# Patient Record
Sex: MALE | Race: WHITE | NOT HISPANIC OR LATINO
[De-identification: names, ages, dates, MRNs, and addresses within clinical notes are randomized per-mention and may not be internally consistent; named-entity substitution may affect disease eponyms.]

---

## 2018-07-27 ENCOUNTER — APPOINTMENT (OUTPATIENT)
Dept: FAMILY MEDICINE | Facility: CLINIC | Age: 58
End: 2018-07-27
Payer: COMMERCIAL

## 2018-07-27 VITALS
RESPIRATION RATE: 14 BRPM | OXYGEN SATURATION: 98 % | HEART RATE: 93 BPM | SYSTOLIC BLOOD PRESSURE: 128 MMHG | TEMPERATURE: 98.6 F | WEIGHT: 231 LBS | BODY MASS INDEX: 32.22 KG/M2 | DIASTOLIC BLOOD PRESSURE: 87 MMHG

## 2018-07-27 VITALS — SYSTOLIC BLOOD PRESSURE: 124 MMHG | DIASTOLIC BLOOD PRESSURE: 76 MMHG

## 2018-07-27 PROCEDURE — 90471 IMMUNIZATION ADMIN: CPT

## 2018-07-27 PROCEDURE — 90715 TDAP VACCINE 7 YRS/> IM: CPT

## 2018-07-27 PROCEDURE — 99396 PREV VISIT EST AGE 40-64: CPT | Mod: 25

## 2018-07-27 NOTE — COUNSELING
[Weight management counseling provided] : Weight management [Healthy eating counseling provided] : healthy eating [Activity counseling provided] : activity [Behavioral health counseling provided] : behavioral health  [Decrease Portions] : Decrease food portions [Low Salt Diet] : Low salt diet [___ min/wk activity recommended] : [unfilled] min/wk activity recommended [Walking] : Walking [Sleep ___ hours/day] : Sleep [unfilled] hours/day [Engage in a relaxing activity] : Engage in a relaxing activity [None] : None [Good understanding] : Patient has a good understanding of lifestyle changes and the steps needed to achieve self management goals

## 2018-07-30 NOTE — PHYSICAL EXAM
[No Acute Distress] : no acute distress [Well Nourished] : well nourished [Well Developed] : well developed [Well-Appearing] : well-appearing [Normal Sclera/Conjunctiva] : normal sclera/conjunctiva [PERRL] : pupils equal round and reactive to light [Fundoscopic Exam Performed] : fundoscopic ~T exam ~C was performed [Normal Outer Ear/Nose] : the outer ears and nose were normal in appearance [Normal Oropharynx] : the oropharynx was normal [Normal TMs] : both tympanic membranes were normal [No JVD] : no jugular venous distention [Supple] : supple [Thyroid Normal, No Nodules] : the thyroid was normal and there were no nodules present [No Respiratory Distress] : no respiratory distress  [Clear to Auscultation] : lungs were clear to auscultation bilaterally [No Accessory Muscle Use] : no accessory muscle use [Normal Rate] : normal rate  [Regular Rhythm] : with a regular rhythm [Normal S1, S2] : normal S1 and S2 [No Murmur] : no murmur heard [Pedal Pulses Present] : the pedal pulses are present [No Edema] : there was no peripheral edema [No Extremity Clubbing/Cyanosis] : no extremity clubbing/cyanosis [No Palpable Aorta] : no palpable aorta [Normal Appearance] : normal in appearance [Soft] : abdomen soft [Non Tender] : non-tender [Non-distended] : non-distended [No Masses] : no abdominal mass palpated [No HSM] : no HSM [Normal Bowel Sounds] : normal bowel sounds [Normal Posterior Cervical Nodes] : no posterior cervical lymphadenopathy [Normal Anterior Cervical Nodes] : no anterior cervical lymphadenopathy [No CVA Tenderness] : no CVA  tenderness [No Spinal Tenderness] : no spinal tenderness [No Joint Swelling] : no joint swelling [Grossly Normal Strength/Tone] : grossly normal strength/tone [No Rash] : no rash [Normal Gait] : normal gait [Coordination Grossly Intact] : coordination grossly intact [No Focal Deficits] : no focal deficits [Deep Tendon Reflexes (DTR)] : deep tendon reflexes were 2+ and symmetric [Normal Affect] : the affect was normal [Alert and Oriented x3] : oriented to person, place, and time [Normal Insight/Judgement] : insight and judgment were intact [de-identified] : obese

## 2018-07-30 NOTE — HEALTH RISK ASSESSMENT
[Very Good] : ~his/her~ current health as very good [Excellent] : ~his/her~  mood as  excellent [No falls in past year] : Patient reported no falls in the past year [0] : 2) Feeling down, depressed, or hopeless: Not at all (0) [HIV test declined] : HIV test declined [Hepatitis C test declined] : Hepatitis C test declined [None] : None [With Family] : lives with family [# of Members in Household ___] :  household currently consist of [unfilled] member(s) [Graduate School] : graduate school [] :  [# Of Children ___] : has [unfilled] children [Sexually Active] : sexually active [Feels Safe at Home] : Feels safe at home [Fully functional (bathing, dressing, toileting, transferring, walking, feeding)] : Fully functional (bathing, dressing, toileting, transferring, walking, feeding) [Fully functional (using the telephone, shopping, preparing meals, housekeeping, doing laundry, using] : Fully functional and needs no help or supervision to perform IADLs (using the telephone, shopping, preparing meals, housekeeping, doing laundry, using transportation, managing medications and managing finances) [Smoke Detector] : smoke detector [Carbon Monoxide Detector] : carbon monoxide detector [Safety elements used in home] : safety elements used in home [Seat Belt] :  uses seat belt [Sunscreen] : uses sunscreen [] : No [de-identified] : renal - reviewed labs and doing well very stable, and derm- for actinic keratosies no cancers  [[id number]] : 0 [Change in mental status noted] : No change in mental status noted [Reports changes in hearing] : Reports no changes in hearing [Reports changes in vision] : Reports no changes in vision [Reports changes in dental health] : Reports no changes in dental health [HIVComments] : done prior to renal transplant [HepatitisCComments] : done prior to renal transplant [de-identified] : none

## 2018-09-10 ENCOUNTER — MED ADMIN CHARGE (OUTPATIENT)
Age: 58
End: 2018-09-10

## 2018-11-08 PROBLEM — Z23 NEED FOR TDAP VACCINATION: Status: RESOLVED | Noted: 2018-07-26 | Resolved: 2018-11-08

## 2018-11-09 ENCOUNTER — OUTPATIENT (OUTPATIENT)
Dept: OUTPATIENT SERVICES | Facility: HOSPITAL | Age: 58
LOS: 1 days | End: 2018-11-09
Payer: COMMERCIAL

## 2018-11-09 ENCOUNTER — APPOINTMENT (OUTPATIENT)
Dept: FAMILY MEDICINE | Facility: CLINIC | Age: 58
End: 2018-11-09
Payer: COMMERCIAL

## 2018-11-09 VITALS
RESPIRATION RATE: 14 BRPM | OXYGEN SATURATION: 98 % | SYSTOLIC BLOOD PRESSURE: 110 MMHG | HEIGHT: 71 IN | BODY MASS INDEX: 32.76 KG/M2 | WEIGHT: 234 LBS | HEART RATE: 96 BPM | DIASTOLIC BLOOD PRESSURE: 80 MMHG

## 2018-11-09 DIAGNOSIS — Z23 ENCOUNTER FOR IMMUNIZATION: ICD-10-CM

## 2018-11-09 DIAGNOSIS — Z01.818 ENCOUNTER FOR OTHER PREPROCEDURAL EXAMINATION: ICD-10-CM

## 2018-11-09 PROCEDURE — 93010 ELECTROCARDIOGRAM REPORT: CPT | Mod: NC

## 2018-11-09 PROCEDURE — 99213 OFFICE O/P EST LOW 20 MIN: CPT

## 2018-11-09 PROCEDURE — 93005 ELECTROCARDIOGRAM TRACING: CPT

## 2018-11-09 RX ORDER — CHOLECALCIFEROL (VITAMIN D3) 250 MCG
250 MCG CAPSULE ORAL
Qty: 10 | Refills: 0 | Status: ACTIVE | COMMUNITY
Start: 2018-11-09

## 2018-11-09 NOTE — COUNSELING
[Weight management counseling provided] : Weight management [Healthy eating counseling provided] : healthy eating [Activity counseling provided] : activity [Behavioral health counseling provided] : behavioral health  [Decrease Portions] : Decrease food portions [Low Salt Diet] : Low salt diet [___ min/wk activity recommended] : [unfilled] min/wk activity recommended [Walking] : Walking [Sleep ___ hours/day] : Sleep [unfilled] hours/day [Engage in a relaxing activity] : Engage in a relaxing activity [None] : None [Needs reinforcement, provided] : Patient needs reinforcement on understanding lifestyle changes and  the steps needed to achieve self management goals and reinforcement was provided

## 2018-11-12 NOTE — ADDENDUM
[FreeTextEntry1] : Review of EKG and lab data shows- EKG normal variant and unchanged from prior EKG in office borderline LVH, labs- normal CBC, renal profile normal electrolytes with creatinine 1.72 (at baseline post transplant) and GFR 46- stable and at baseline, other labs within normal range- see attached. medically optimized for proposed procedure.

## 2018-11-12 NOTE — ASSESSMENT
[High Risk Surgery - Intraperitoneal, Intrathoracic or Supringuinal Vascular Procedures] : High Risk Surgery - Intraperitoneal, Intrathoracic or Supringuinal Vascular Procedures - No (0) [Ischemic Heart Disease] : Ischemic Heart Disease - No (0) [Congestive Heart Failure] : Congestive Heart Failure - No (0) [Prior Cerebrovascular Accident or TIA] : Prior Cerebrovascular Accident or TIA - No (0) [Creatinine >= 2mg/dL (1 Point)] : Creatinine >= 2mg/dL - No (0) [Insulin-dependent Diabetic (1 Point)] : Insulin-dependent Diabetic - No (0) [0] : 0 , RCRI Class: I, Risk of Post-Op Cardiac Complications: 0.4%, Procedure Risk: Low-Risk [Patient Optimized for Surgery] : Patient optimized for surgery [As per surgery] : as per surgery [ECG] : ECG [FreeTextEntry5] : none [FreeTextEntry6] : hold aspirin for 3 days prior to procedure [FreeTextEntry7] : take all am medications as usual with sip of water

## 2018-11-12 NOTE — REVIEW OF SYSTEMS
[Back Pain] : back pain [Negative] : Heme/Lymph [FreeTextEntry9] : mild rare low back stiffness on occasion

## 2018-11-12 NOTE — HISTORY OF PRESENT ILLNESS
[Chronic Kidney Disease] : chronic kidney disease [No Pertinent Cardiac History] : no history of aortic stenosis, atrial fibrillation, coronary artery disease, recent myocardial infarction, or implantable device/pacemaker [No Pertinent Pulmonary History] : no history of asthma, COPD, sleep apnea, or smoking [No Adverse Anesthesia Reaction] : no adverse anesthesia reaction in self or family member [FreeTextEntry1] : Moh's procedure [FreeTextEntry2] : 11/15/18 [FreeTextEntry3] : Dr. 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 He is here for medical evaluation prior to the procedure. 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 His renal function is currently stable and he sees nephrology regularly. He also has hypertension, obesity, impaired fasting glucose, and hyperlipidemia all of which are stable at this time.

## 2018-11-12 NOTE — PHYSICAL EXAM
[No Acute Distress] : no acute distress [Well Nourished] : well nourished [Well Developed] : well developed [Well-Appearing] : well-appearing [Normal Sclera/Conjunctiva] : normal sclera/conjunctiva [PERRL] : pupils equal round and reactive to light [Fundoscopic Exam Performed] : fundoscopic ~T exam ~C was performed [Normal Outer Ear/Nose] : the outer ears and nose were normal in appearance [Normal Oropharynx] : the oropharynx was normal [Normal TMs] : both tympanic membranes were normal [No JVD] : no jugular venous distention [Supple] : supple [Thyroid Normal, No Nodules] : the thyroid was normal and there were no nodules present [No Respiratory Distress] : no respiratory distress  [Clear to Auscultation] : lungs were clear to auscultation bilaterally [No Accessory Muscle Use] : no accessory muscle use [Normal Rate] : normal rate  [Regular Rhythm] : with a regular rhythm [Normal S1, S2] : normal S1 and S2 [No Murmur] : no murmur heard [Pedal Pulses Present] : the pedal pulses are present [Soft] : abdomen soft [Non Tender] : non-tender [Non-distended] : non-distended [No Masses] : no abdominal mass palpated [No HSM] : no HSM [Normal Bowel Sounds] : normal bowel sounds [Normal Supraclavicular Nodes] : no supraclavicular lymphadenopathy [Normal Posterior Cervical Nodes] : no posterior cervical lymphadenopathy [Normal Anterior Cervical Nodes] : no anterior cervical lymphadenopathy [No CVA Tenderness] : no CVA  tenderness [No Spinal Tenderness] : no spinal tenderness [Normal Gait] : normal gait [No Focal Deficits] : no focal deficits [Normal Affect] : the affect was normal [Alert and Oriented x3] : oriented to person, place, and time [Normal Insight/Judgement] : insight and judgment were intact [___ +] : bilateral [unfilled]U+ pitting edema to the ankles [de-identified] : obese [de-identified] : superficial varicosities bilaterally, no evidence of tenderness [de-identified] : left temporal area scar from the prior removal of squamous cell carcinoma, very close to the canthus of the left eye

## 2018-11-15 ENCOUNTER — OUTPATIENT (OUTPATIENT)
Dept: OUTPATIENT SERVICES | Facility: HOSPITAL | Age: 58
LOS: 1 days | End: 2018-11-15
Payer: COMMERCIAL

## 2018-11-15 VITALS
HEIGHT: 71 IN | OXYGEN SATURATION: 100 % | HEART RATE: 90 BPM | RESPIRATION RATE: 16 BRPM | WEIGHT: 228.84 LBS | TEMPERATURE: 98 F | SYSTOLIC BLOOD PRESSURE: 125 MMHG | DIASTOLIC BLOOD PRESSURE: 88 MMHG

## 2018-11-15 VITALS
OXYGEN SATURATION: 100 % | SYSTOLIC BLOOD PRESSURE: 113 MMHG | RESPIRATION RATE: 17 BRPM | DIASTOLIC BLOOD PRESSURE: 80 MMHG | HEART RATE: 74 BPM

## 2018-11-15 DIAGNOSIS — C44.1291 SQUAMOUS CELL CARCINOMA OF SKIN OF LEFT UPPER EYELID, INCLUDING CANTHUS: ICD-10-CM

## 2018-11-15 DIAGNOSIS — Z94.0 KIDNEY TRANSPLANT STATUS: Chronic | ICD-10-CM

## 2018-11-15 PROCEDURE — 14041 TIS TRNFR F/C/C/M/N/A/G/H/F: CPT

## 2018-11-15 NOTE — ASU PATIENT PROFILE, ADULT - PMH
BPH (benign prostatic hyperplasia)    HLD (hyperlipidemia)    HTN (hypertension)    IFG (impaired fasting glucose)    Nephronophthisis

## 2018-11-15 NOTE — ASU DISCHARGE PLAN (ADULT/PEDIATRIC). - SPECIAL INSTRUCTIONS
keflex 250 mg po qid for 5 days  bacitracin ophthalmic ointment to left lateral wound once daily  report severe eye pain   active bleeding

## 2019-02-28 NOTE — ASU DISCHARGE PLAN (ADULT/PEDIATRIC). - WITH DR
Well developed savi Well developed Well developed Well developed Well developed Well developed Well developed Well developed

## 2019-10-17 PROBLEM — I10 ESSENTIAL (PRIMARY) HYPERTENSION: Chronic | Status: ACTIVE | Noted: 2018-11-15

## 2019-10-17 PROBLEM — R73.01 IMPAIRED FASTING GLUCOSE: Chronic | Status: ACTIVE | Noted: 2018-11-15

## 2019-10-17 PROBLEM — N40.0 BENIGN PROSTATIC HYPERPLASIA WITHOUT LOWER URINARY TRACT SYMPTOMS: Chronic | Status: ACTIVE | Noted: 2018-11-15

## 2019-10-17 PROBLEM — Q61.5 MEDULLARY CYSTIC KIDNEY: Chronic | Status: ACTIVE | Noted: 2018-11-15

## 2020-03-26 ENCOUNTER — NON-APPOINTMENT (OUTPATIENT)
Age: 60
End: 2020-03-26

## 2020-04-14 ENCOUNTER — NON-APPOINTMENT (OUTPATIENT)
Age: 60
End: 2020-04-14

## 2020-04-14 ENCOUNTER — APPOINTMENT (OUTPATIENT)
Dept: FAMILY MEDICINE | Facility: CLINIC | Age: 60
End: 2020-04-14
Payer: COMMERCIAL

## 2020-04-14 PROCEDURE — G2012 BRIEF CHECK IN BY MD/QHP: CPT

## 2020-04-14 RX ORDER — FAMOTIDINE 20 MG/1
20 TABLET, FILM COATED ORAL
Qty: 60 | Refills: 0 | Status: COMPLETED | COMMUNITY
Start: 2020-04-14

## 2020-04-14 NOTE — HISTORY OF PRESENT ILLNESS
[Verbal consent obtained from patient] : the patient, [unfilled] [Time Spent: ___ minutes] : I have spent [unfilled] minutes with the patient on the telephone [FreeTextEntry1] : fever 100-101 3-4 times per day still, symptoms for 3 weeks but first week not so much working during days for the first week. Then last week much worse. Still having increasing SOB and same level of cough. Spoke to transplant team initially but not since seemingly worsening with regard to SOB. Very high risk patient and despite no GI symptoms he is advised to call transplant team for additional advice at this time regarding possible need for coming to the hospital for admission- advising to be admitted with his transplant team rather than regular community based facility. Agrees to this plan and will call now. Also will call for follow up tomorrow.

## 2020-04-15 NOTE — PLAN
[FreeTextEntry1] : much improved today and will call if symptoms worsen and will let me know when fever has subsided.

## 2020-04-15 NOTE — HISTORY OF PRESENT ILLNESS
[No] : Patient has not been hospitalized for COVID-19. [Yes] : yes [Mild] : mild [None] : none [Improved] : improved [Patient advised to contact office if symptoms progress] : Patient advised to contact office if symptoms progress [Verbal consent obtained from patient] : the patient, [unfilled] [Time Spent: ___ minutes] : I have spent [unfilled] minutes with the patient on the telephone [TextBox_8] : 22 [TextBox_13] : 100 [TextBox_28] : pulse ox 98%, mild am cough but otherwise not much, much les SOB today. Feeling better, less conscious  [FreeTextEntry1] : told by transplant team micophenylate to be cut for 1 week. Not billable for short call

## 2020-05-08 ENCOUNTER — APPOINTMENT (OUTPATIENT)
Dept: FAMILY MEDICINE | Facility: CLINIC | Age: 60
End: 2020-05-08

## 2020-05-10 ENCOUNTER — RESULT REVIEW (OUTPATIENT)
Age: 60
End: 2020-05-10

## 2020-05-10 LAB
SARS-COV-2 IGG SERPL IA-ACNC: 0.1 INDEX
SARS-COV-2 IGG SERPL QL IA: NEGATIVE

## 2020-05-11 ENCOUNTER — TRANSCRIPTION ENCOUNTER (OUTPATIENT)
Age: 60
End: 2020-05-11

## 2020-05-13 ENCOUNTER — TRANSCRIPTION ENCOUNTER (OUTPATIENT)
Age: 60
End: 2020-05-13

## 2020-08-04 ENCOUNTER — APPOINTMENT (OUTPATIENT)
Dept: FAMILY MEDICINE | Facility: CLINIC | Age: 60
End: 2020-08-04

## 2020-08-06 ENCOUNTER — APPOINTMENT (OUTPATIENT)
Dept: FAMILY MEDICINE | Facility: CLINIC | Age: 60
End: 2020-08-06

## 2020-08-06 ENCOUNTER — APPOINTMENT (OUTPATIENT)
Dept: FAMILY MEDICINE | Facility: CLINIC | Age: 60
End: 2020-08-06
Payer: COMMERCIAL

## 2020-08-06 VITALS
RESPIRATION RATE: 14 BRPM | TEMPERATURE: 97.3 F | OXYGEN SATURATION: 99 % | BODY MASS INDEX: 29.68 KG/M2 | SYSTOLIC BLOOD PRESSURE: 140 MMHG | HEART RATE: 105 BPM | DIASTOLIC BLOOD PRESSURE: 80 MMHG | HEIGHT: 71 IN | WEIGHT: 212 LBS

## 2020-08-06 DIAGNOSIS — Z23 ENCOUNTER FOR IMMUNIZATION: ICD-10-CM

## 2020-08-06 DIAGNOSIS — Z92.29 PERSONAL HISTORY OF OTHER DRUG THERAPY: ICD-10-CM

## 2020-08-06 DIAGNOSIS — Z01.818 ENCOUNTER FOR OTHER PREPROCEDURAL EXAMINATION: ICD-10-CM

## 2020-08-06 PROCEDURE — 90732 PPSV23 VACC 2 YRS+ SUBQ/IM: CPT

## 2020-08-06 PROCEDURE — 99396 PREV VISIT EST AGE 40-64: CPT | Mod: 25

## 2020-08-06 PROCEDURE — G0009: CPT

## 2020-08-06 RX ORDER — NIACINAMIDE 500 MG
500 TABLET ORAL TWICE DAILY
Qty: 1 | Refills: 0 | Status: ACTIVE | COMMUNITY
Start: 2020-08-06

## 2020-08-07 PROBLEM — Z01.818 PREOP TESTING: Status: RESOLVED | Noted: 2018-11-09 | Resolved: 2020-08-07

## 2020-08-07 NOTE — COUNSELING
[Engage in a relaxing activity] : Engage in a relaxing activity [Behavioral health counseling provided] : Behavioral health counseling provided [Sleep ___ hours/day] : Sleep [unfilled] hours/day [AUDIT-C Screening administered and reviewed] : AUDIT-C Screening administered and reviewed [None] : None [Good understanding] : Patient has a good understanding of lifestyle changes and steps needed to achieve self management goal

## 2020-08-07 NOTE — REVIEW OF SYSTEMS
[Back Pain] : back pain [Negative] : Psychiatric [FreeTextEntry9] : mild rare low back stiffness on occasion [de-identified] : well healed excisional surgery and graft of squamous cell cancer of the left temple

## 2020-08-07 NOTE — HEALTH RISK ASSESSMENT
[Very Good] : ~his/her~  mood as very good [None] : None [With Family] : lives with family [] :  [Employed] : employed [# of Members in Household ___] :  household currently consist of [unfilled] member(s) [Sexually Active] : sexually active [# Of Children ___] : has [unfilled] children [Feels Safe at Home] : Feels safe at home [Fully functional (bathing, dressing, toileting, transferring, walking, feeding)] : Fully functional (bathing, dressing, toileting, transferring, walking, feeding) [Fully functional (using the telephone, shopping, preparing meals, housekeeping, doing laundry, using] : Fully functional and needs no help or supervision to perform IADLs (using the telephone, shopping, preparing meals, housekeeping, doing laundry, using transportation, managing medications and managing finances) [Reports normal functional visual acuity (ie: able to read med bottle)] : Reports normal functional visual acuity [Smoke Detector] : smoke detector [Carbon Monoxide Detector] : carbon monoxide detector [Safety elements used in home] : safety elements used in home [Seat Belt] :  uses seat belt [Sunscreen] : uses sunscreen [de-identified] : transplant team- reviewed labs from those visits [FreeTextEntry1] : recent increase in his creatinine- is s/p renal transplant and now post COVID without ab [Change in mental status noted] : No change in mental status noted [High Risk Behavior] : no high risk behavior [Reports changes in hearing] : Reports no changes in hearing [Reports changes in vision] : Reports no changes in vision [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

## 2020-08-07 NOTE — PHYSICAL EXAM
[No Acute Distress] : no acute distress [Well Nourished] : well nourished [Fundoscopic Exam Performed] : fundoscopic ~T exam ~C was performed [PERRL] : pupils equal round and reactive to light [Well Developed] : well developed [Normal Sclera/Conjunctiva] : normal sclera/conjunctiva [Normal Outer Ear/Nose] : the outer ears and nose were normal in appearance [Normal TMs] : both tympanic membranes were normal [Normal Oropharynx] : the oropharynx was normal [Supple] : supple [Thyroid Normal, No Nodules] : the thyroid was normal and there were no nodules present [No JVD] : no jugular venous distention [No Respiratory Distress] : no respiratory distress  [Clear to Auscultation] : lungs were clear to auscultation bilaterally [No Accessory Muscle Use] : no accessory muscle use [Normal Rate] : normal rate  [Regular Rhythm] : with a regular rhythm [Pedal Pulses Present] : the pedal pulses are present [Normal S1, S2] : normal S1 and S2 [No Murmur] : no murmur heard [No Edema] : there was no peripheral edema [No Extremity Clubbing/Cyanosis] : no extremity clubbing/cyanosis [No Palpable Aorta] : no palpable aorta [Soft] : abdomen soft [Non-distended] : non-distended [No Masses] : no abdominal mass palpated [Non Tender] : non-tender [Declined Rectal Exam] : declined rectal exam [Normal Supraclavicular Nodes] : no supraclavicular lymphadenopathy [No HSM] : no HSM [No CVA Tenderness] : no CVA  tenderness [Normal Anterior Cervical Nodes] : no anterior cervical lymphadenopathy [Normal Posterior Cervical Nodes] : no posterior cervical lymphadenopathy [No Focal Deficits] : no focal deficits [No Joint Swelling] : no joint swelling [No Skin Lesions] : no skin lesions [Alert and Oriented x3] : oriented to person, place, and time [Normal Gait] : normal gait [Normal Affect] : the affect was normal [Normal Insight/Judgement] : insight and judgment were intact [FreeTextEntry1] : defer to urology [de-identified] : overweight [de-identified] : scar secondary to renal transplant [de-identified] : defer to urology [de-identified] : derm follow up annually

## 2021-07-30 ENCOUNTER — APPOINTMENT (OUTPATIENT)
Dept: SURGICAL ONCOLOGY | Facility: CLINIC | Age: 61
End: 2021-07-30
Payer: COMMERCIAL

## 2021-07-30 PROCEDURE — 99245 OFF/OP CONSLTJ NEW/EST HI 55: CPT

## 2021-07-30 NOTE — PHYSICAL EXAM
[Normal] : supple, no neck mass and thyroid not enlarged [Normal Neck Lymph Nodes] : normal neck lymph nodes  [Normal Supraclavicular Lymph Nodes] : normal supraclavicular lymph nodes [Normal Groin Lymph Nodes] : normal groin lymph nodes [Normal Axillary Lymph Nodes] : normal axillary lymph nodes [Normal] : oriented to person, place and time, with appropriate affect [de-identified] : 3 cm open wound left mid eyebrow from recent Mohs surgery. base of wound is clean, no gross tumor noted. wound is mobile and not fixed to underlying bone. left zygomatic prior resection and radiation site well healed without evidence of recurrence.

## 2021-07-30 NOTE — HISTORY OF PRESENT ILLNESS
[de-identified] : Pt is a 59 y/o male who presents an initial consultation for squamous cell carcinoma of left eyebrow. He was seen by Dr. Espino who performed Mohs surgery 2 days ago but stopped the procedure due to a persistently positive deep margin.  The pt. has a hx or a renal transplant on immunosuppression and had a prior SCC resected from the left zygomatic region with adjuvant radiation tx.\par \par Dermatopathology Report (7/14/21):\par Left Mid Eyebrow\par -Squamous cell carcinoma, keratoacanthoma type; transected \par \par PMHx: HTN, SCC, Renal transplant (from wife)- 2004, Allergies to Zithromax

## 2021-07-30 NOTE — CONSULT LETTER
[Dear  ___] : Dear  [unfilled], [Consult Letter:] : I had the pleasure of evaluating your patient, [unfilled]. [Please see my note below.] : Please see my note below. [Sincerely,] : Sincerely, [FreeTextEntry3] : Rakan Madden MD FACS\par

## 2021-07-30 NOTE — ASSESSMENT
[FreeTextEntry1] : Left mid eyebrow SCC \par S/p Mohs surgery w/ positive margins and perineural invasion \par History of renal transplant on immunosuppression\par I had a long discussion with the pt and his wife and discussed case with Dr. Morgan of plastic surgery \par Will get MRI to rule out underlying orbital involvement \par Will schedule wide excision with plastic reconstruction to clear the margins if possible\par Surgical procedure discussed in detail\par All questions answered \par

## 2021-07-30 NOTE — ADDENDUM
[FreeTextEntry1] : I, Myra Bob, acted solely as a scribe for Dr. Rakan Madden on this date 07/30/2021.\par

## 2021-07-31 ENCOUNTER — APPOINTMENT (OUTPATIENT)
Dept: MRI IMAGING | Facility: IMAGING CENTER | Age: 61
End: 2021-07-31
Payer: COMMERCIAL

## 2021-07-31 ENCOUNTER — TRANSCRIPTION ENCOUNTER (OUTPATIENT)
Age: 61
End: 2021-07-31

## 2021-07-31 ENCOUNTER — OUTPATIENT (OUTPATIENT)
Dept: OUTPATIENT SERVICES | Facility: HOSPITAL | Age: 61
LOS: 1 days | End: 2021-07-31
Payer: COMMERCIAL

## 2021-07-31 ENCOUNTER — RESULT REVIEW (OUTPATIENT)
Age: 61
End: 2021-07-31

## 2021-07-31 DIAGNOSIS — C44.92 SQUAMOUS CELL CARCINOMA OF SKIN, UNSPECIFIED: ICD-10-CM

## 2021-07-31 DIAGNOSIS — Z94.0 KIDNEY TRANSPLANT STATUS: Chronic | ICD-10-CM

## 2021-07-31 PROCEDURE — 70543 MRI ORBT/FAC/NCK W/O &W/DYE: CPT

## 2021-07-31 PROCEDURE — 70543 MRI ORBT/FAC/NCK W/O &W/DYE: CPT | Mod: 26

## 2021-07-31 PROCEDURE — A9585: CPT

## 2021-08-01 ENCOUNTER — APPOINTMENT (OUTPATIENT)
Dept: MRI IMAGING | Facility: IMAGING CENTER | Age: 61
End: 2021-08-01

## 2021-08-02 ENCOUNTER — TRANSCRIPTION ENCOUNTER (OUTPATIENT)
Age: 61
End: 2021-08-02

## 2021-08-02 ENCOUNTER — INPATIENT (INPATIENT)
Facility: HOSPITAL | Age: 61
LOS: 0 days | Discharge: HOME | DRG: 577 | End: 2021-08-03
Attending: FAMILY MEDICINE | Admitting: STUDENT IN AN ORGANIZED HEALTH CARE EDUCATION/TRAINING PROGRAM
Payer: COMMERCIAL

## 2021-08-02 VITALS
HEART RATE: 105 BPM | OXYGEN SATURATION: 97 % | RESPIRATION RATE: 16 BRPM | SYSTOLIC BLOOD PRESSURE: 152 MMHG | TEMPERATURE: 98 F | WEIGHT: 214.95 LBS | DIASTOLIC BLOOD PRESSURE: 84 MMHG | HEIGHT: 71 IN

## 2021-08-02 DIAGNOSIS — C44.320 SQUAMOUS CELL CARCINOMA OF SKIN OF UNSPECIFIED PARTS OF FACE: ICD-10-CM

## 2021-08-02 DIAGNOSIS — Z94.0 KIDNEY TRANSPLANT STATUS: Chronic | ICD-10-CM

## 2021-08-02 DIAGNOSIS — Z85.828 PERSONAL HISTORY OF OTHER MALIGNANT NEOPLASM OF SKIN: Chronic | ICD-10-CM

## 2021-08-02 LAB
ALBUMIN SERPL ELPH-MCNC: 4.2 G/DL — SIGNIFICANT CHANGE UP (ref 3.3–5)
ALP SERPL-CCNC: 72 U/L — SIGNIFICANT CHANGE UP (ref 40–120)
ALT FLD-CCNC: 33 U/L — SIGNIFICANT CHANGE UP (ref 10–45)
ANION GAP SERPL CALC-SCNC: 8 MMOL/L — SIGNIFICANT CHANGE UP (ref 5–17)
AST SERPL-CCNC: 26 U/L — SIGNIFICANT CHANGE UP (ref 10–40)
BASOPHILS # BLD AUTO: 0.06 K/UL — SIGNIFICANT CHANGE UP (ref 0–0.2)
BASOPHILS NFR BLD AUTO: 0.7 % — SIGNIFICANT CHANGE UP (ref 0–2)
BILIRUB SERPL-MCNC: 0.7 MG/DL — SIGNIFICANT CHANGE UP (ref 0.2–1.2)
BLD GP AB SCN SERPL QL: SIGNIFICANT CHANGE UP
BUN SERPL-MCNC: 34 MG/DL — HIGH (ref 7–23)
CALCIUM SERPL-MCNC: 9.4 MG/DL — SIGNIFICANT CHANGE UP (ref 8.4–10.5)
CHLORIDE SERPL-SCNC: 107 MMOL/L — SIGNIFICANT CHANGE UP (ref 96–108)
CO2 SERPL-SCNC: 24 MMOL/L — SIGNIFICANT CHANGE UP (ref 22–31)
CREAT SERPL-MCNC: 1.92 MG/DL — HIGH (ref 0.5–1.3)
EOSINOPHIL # BLD AUTO: 0.23 K/UL — SIGNIFICANT CHANGE UP (ref 0–0.5)
EOSINOPHIL NFR BLD AUTO: 2.8 % — SIGNIFICANT CHANGE UP (ref 0–6)
GLUCOSE SERPL-MCNC: 130 MG/DL — HIGH (ref 70–99)
HCT VFR BLD CALC: 39.5 % — SIGNIFICANT CHANGE UP (ref 39–50)
HGB BLD-MCNC: 13.5 G/DL — SIGNIFICANT CHANGE UP (ref 13–17)
IMM GRANULOCYTES NFR BLD AUTO: 0.4 % — SIGNIFICANT CHANGE UP (ref 0–1.5)
INR BLD: 1.06 RATIO — SIGNIFICANT CHANGE UP (ref 0.88–1.16)
LYMPHOCYTES # BLD AUTO: 0.85 K/UL — LOW (ref 1–3.3)
LYMPHOCYTES # BLD AUTO: 10.2 % — LOW (ref 13–44)
MCHC RBC-ENTMCNC: 31.8 PG — SIGNIFICANT CHANGE UP (ref 27–34)
MCHC RBC-ENTMCNC: 34.2 GM/DL — SIGNIFICANT CHANGE UP (ref 32–36)
MCV RBC AUTO: 93.2 FL — SIGNIFICANT CHANGE UP (ref 80–100)
MONOCYTES # BLD AUTO: 0.66 K/UL — SIGNIFICANT CHANGE UP (ref 0–0.9)
MONOCYTES NFR BLD AUTO: 7.9 % — SIGNIFICANT CHANGE UP (ref 2–14)
NEUTROPHILS # BLD AUTO: 6.53 K/UL — SIGNIFICANT CHANGE UP (ref 1.8–7.4)
NEUTROPHILS NFR BLD AUTO: 78 % — HIGH (ref 43–77)
NRBC # BLD: 0 /100 WBCS — SIGNIFICANT CHANGE UP (ref 0–0)
PLATELET # BLD AUTO: 221 K/UL — SIGNIFICANT CHANGE UP (ref 150–400)
POTASSIUM SERPL-MCNC: 4.8 MMOL/L — SIGNIFICANT CHANGE UP (ref 3.5–5.3)
POTASSIUM SERPL-SCNC: 4.8 MMOL/L — SIGNIFICANT CHANGE UP (ref 3.5–5.3)
PROT SERPL-MCNC: 7.5 G/DL — SIGNIFICANT CHANGE UP (ref 6–8.3)
PROTHROM AB SERPL-ACNC: 12.8 SEC — SIGNIFICANT CHANGE UP (ref 10.6–13.6)
RBC # BLD: 4.24 M/UL — SIGNIFICANT CHANGE UP (ref 4.2–5.8)
RBC # FLD: 13.2 % — SIGNIFICANT CHANGE UP (ref 10.3–14.5)
SARS-COV-2 RNA SPEC QL NAA+PROBE: SIGNIFICANT CHANGE UP
SODIUM SERPL-SCNC: 139 MMOL/L — SIGNIFICANT CHANGE UP (ref 135–145)
WBC # BLD: 8.36 K/UL — SIGNIFICANT CHANGE UP (ref 3.8–10.5)
WBC # FLD AUTO: 8.36 K/UL — SIGNIFICANT CHANGE UP (ref 3.8–10.5)

## 2021-08-02 PROCEDURE — 99223 1ST HOSP IP/OBS HIGH 75: CPT

## 2021-08-02 PROCEDURE — 93010 ELECTROCARDIOGRAM REPORT: CPT

## 2021-08-02 PROCEDURE — 99285 EMERGENCY DEPT VISIT HI MDM: CPT

## 2021-08-02 PROCEDURE — 71045 X-RAY EXAM CHEST 1 VIEW: CPT | Mod: 26

## 2021-08-02 RX ORDER — TAMSULOSIN HYDROCHLORIDE 0.4 MG/1
0.4 CAPSULE ORAL AT BEDTIME
Refills: 0 | Status: DISCONTINUED | OUTPATIENT
Start: 2021-08-02 | End: 2021-08-03

## 2021-08-02 RX ORDER — TACROLIMUS 5 MG/1
4 CAPSULE ORAL
Refills: 0 | Status: DISCONTINUED | OUTPATIENT
Start: 2021-08-02 | End: 2021-08-03

## 2021-08-02 RX ORDER — FAMOTIDINE 10 MG/ML
40 INJECTION INTRAVENOUS AT BEDTIME
Refills: 0 | Status: DISCONTINUED | OUTPATIENT
Start: 2021-08-02 | End: 2021-08-03

## 2021-08-02 RX ORDER — LOSARTAN POTASSIUM 100 MG/1
50 TABLET, FILM COATED ORAL DAILY
Refills: 0 | Status: DISCONTINUED | OUTPATIENT
Start: 2021-08-03 | End: 2021-08-03

## 2021-08-02 RX ORDER — ACETAMINOPHEN 500 MG
650 TABLET ORAL EVERY 6 HOURS
Refills: 0 | Status: DISCONTINUED | OUTPATIENT
Start: 2021-08-02 | End: 2021-08-03

## 2021-08-02 RX ORDER — TACROLIMUS 5 MG/1
4 CAPSULE ORAL
Refills: 0 | Status: DISCONTINUED | OUTPATIENT
Start: 2021-08-02 | End: 2021-08-02

## 2021-08-02 RX ORDER — MYCOPHENOLATE MOFETIL 250 MG/1
500 CAPSULE ORAL
Refills: 0 | Status: DISCONTINUED | OUTPATIENT
Start: 2021-08-02 | End: 2021-08-03

## 2021-08-02 RX ORDER — ONDANSETRON 8 MG/1
4 TABLET, FILM COATED ORAL EVERY 8 HOURS
Refills: 0 | Status: DISCONTINUED | OUTPATIENT
Start: 2021-08-02 | End: 2021-08-02

## 2021-08-02 RX ORDER — LANOLIN ALCOHOL/MO/W.PET/CERES
3 CREAM (GRAM) TOPICAL AT BEDTIME
Refills: 0 | Status: DISCONTINUED | OUTPATIENT
Start: 2021-08-02 | End: 2021-08-03

## 2021-08-02 RX ADMIN — FAMOTIDINE 40 MILLIGRAM(S): 10 INJECTION INTRAVENOUS at 21:35

## 2021-08-02 RX ADMIN — MYCOPHENOLATE MOFETIL 500 MILLIGRAM(S): 250 CAPSULE ORAL at 21:41

## 2021-08-02 RX ADMIN — TAMSULOSIN HYDROCHLORIDE 0.4 MILLIGRAM(S): 0.4 CAPSULE ORAL at 21:35

## 2021-08-02 NOTE — ED ADULT NURSE NOTE - OBJECTIVE STATEMENT
pt sent in by Dr. Anand Maldonado for surgical admission for surgery to left eyebrow for skin cancer removal and reconstructive surgery with MD kim. pt denies any pain. Denies any complaints at this time. Pt with PMH of Skin CA, kidney transplant, HLD and HTN. pt presents with dressed wound to left eyebrow from prior removal attempt by MD maldonado last week.

## 2021-08-02 NOTE — H&P ADULT - NSHPPHYSICALEXAM_GEN_ALL_CORE
T(C): 36.9 (08-02-21 @ 15:38), Max: 36.9 (08-02-21 @ 15:38)  HR: 84 (08-02-21 @ 15:38) (84 - 105)  BP: 135/71 (08-02-21 @ 15:38) (135/71 - 152/84)  RR: 16 (08-02-21 @ 15:38) (16 - 16)  SpO2: 98% (08-02-21 @ 15:38) (97% - 98%)    GENERAL: patient appears well, no acute distress, appropriate, pleasant  EYES: sclera clear, no exudates, EOMI  ENMT: oropharynx clear without erythema, no exudates, moist mucous membranes  NECK: supple, soft  LUNGS: good air entry bilaterally, clear to auscultation, symmetric breath sounds, no wheezing or rhonchi appreciated  HEART: soft S1/S2, regular rate and rhythm, no murmurs noted, no lower extremity edema  GASTROINTESTINAL: abdomen is soft, nontender, nondistended, normoactive bowel sounds  INTEGUMENT: warm, well-perfused, dressing above left eyebrow clean, dry and intact  MUSCULOSKELETAL: no clubbing or cyanosis, no obvious deformity  NEUROLOGIC: awake, alert, oriented x3, good muscle tone in 4 extremities, no obvious sensory deficits  PSYCHIATRIC: mood is good, affect is congruent, linear and logical thought process

## 2021-08-02 NOTE — H&P ADULT - ASSESSMENT
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, s/p right kidney transplant   - Continue home regimen of Mycophenolate mofetil and Tacrolimus    #HTN  - Continue Losartan    #BPH  - Continue Flomax    #Prophylactic Measures  - DVT PPx: SCDs   - Continue Protonix (interchange for home med Famotidine)    IMPROVE VTE Individual Risk Assessment  RISK                                                                Points  [  ] Previous VTE                                            3  [  ] Thrombophilia                                         2  [  ] Lower limb paralysis                               2        (unable to hold up >15 seconds)    [  ] Current Cancer (within 6 months)       2   [  ] Immobilization > 24 hrs                         1  [  ] ICU/CCU stay > 24 hours                       1  [ x ] Age > 60                                                  1    IMPROVE VTE Score: 1  IMPROVE Score 0-1: Low Risk, No VTE prophylaxis required for most patients, encourage ambulation.

## 2021-08-02 NOTE — ED PROVIDER NOTE - CLINICAL SUMMARY MEDICAL DECISION MAKING FREE TEXT BOX
pt 60y m with squamous cell carcinoma over left eyebrow. sent in for admission by Dr. Bull for excision in collaboration with Dr. Morgan   pt has no complaints  Dr. Madden aware pt in ED. will admit to medicine

## 2021-08-02 NOTE — ED PROVIDER NOTE - OBJECTIVE STATEMENT
pt 60y m with squamous cell carcinoma over left eyebrow. sent in for admission by Dr. Bull for excision in collaboration with Dr. Morgan   pt has no complaints

## 2021-08-02 NOTE — H&P ADULT - NSICDXPASTSURGICALHX_GEN_ALL_CORE_FT
PAST SURGICAL HISTORY:  H/O kidney transplant right, 2004    H/O Mohs micrographic surgery for skin cancer

## 2021-08-02 NOTE — H&P ADULT - NSHPREVIEWOFSYSTEMS_GEN_ALL_CORE
CONSTITUTIONAL: denies fever, chills, fatigue, weakness  HEENT: denies blurred vision, sore throat  SKIN: admits lesion above left eyebrow  CARDIOVASCULAR: denies chest pain, chest pressure, palpitations  RESPIRATORY: denies shortness of breath, cough, sputum production  GASTROINTESTINAL: denies nausea, vomiting, diarrhea, abdominal pain  GENITOURINARY: denies dysuria, discharge  NEUROLOGICAL: denies numbness, headache, focal weakness  MUSCULOSKELETAL: denies new joint pain, muscle aches  HEMATOLOGIC: denies gross bleeding, bruising  LYMPHATICS: denies extremity swelling  PSYCHIATRIC: denies recent changes in anxiety, depression

## 2021-08-02 NOTE — H&P ADULT - NSICDXPASTMEDICALHX_GEN_ALL_CORE_FT
PAST MEDICAL HISTORY:  BPH (benign prostatic hyperplasia)     HLD (hyperlipidemia)     HTN (hypertension)     IFG (impaired fasting glucose)     Nephronophthisis

## 2021-08-02 NOTE — H&P ADULT - HISTORY OF PRESENT ILLNESS
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 He reports undergoing surgery for previous skin cancer there in 2018, followed by radiation with Dr Isiah Ramirez. He recently underwent Mohs surgery with Dr Stefano Espino but was referred for surgery due to the extent of the cancer. He denies any complaints at this time, no CP, SOB, abdominal pain, N/V/D. He ambulates without dyspnea or difficulty. Denies issues with anesthesia in the past.    In the ED, he was initially slightly tachycardic at 105 bpm, 84 bpm on repeat, and the rest of his VS were stable. Labs showed BUN/Cr 34/1.92 and eGFR 37, otherwise unremarkable. CXR was clear on prelim review. EKG showed NSR.

## 2021-08-02 NOTE — H&P ADULT - NSHPSOCIALHISTORY_GEN_ALL_CORE
Tobacco: denies  EtOH: ~1 drink a day, denies hx of EtOH withdrawal  Recreational drug use: denies  Lives with: wife and son  Ambulates/ADLs: independent    COVID-19 Vaccine: Moderna, completed Feb 2021

## 2021-08-02 NOTE — ED PROVIDER NOTE - PHYSICAL EXAMINATION
Gen: Well appearing in NAD  Head: NC/AT  Neck: trachea midline  Resp:  No distress, cta b/l  heart: rrr  Ext: no deformities  Neuro:  A&O appears non focal  Skin: squamous cell ca left forehead  Psych:  Normal affect and mood

## 2021-08-02 NOTE — ED PROVIDER NOTE - PMH
Satisfactory
BPH (benign prostatic hyperplasia)    HLD (hyperlipidemia)    HTN (hypertension)    IFG (impaired fasting glucose)    Nephronophthisis

## 2021-08-02 NOTE — PATIENT PROFILE ADULT - NSPROIMPLANTSMEDDEV_GEN_A_NUR
"Suman Nagy is a 80 y.o. male who is being evaluated via a billable telephone visit.      The patient has been notified of following:     \"This telephone visit will be conducted via a call between you and your physician/provider. We have found that certain health care needs can be provided without the need for a physical exam.  This service lets us provide the care you need with a short phone conversation.  If a prescription is necessary we can send it directly to your pharmacy.  If lab work is needed we can place an order for that and you can then stop by our lab to have the test done at a later time.    Telephone visits are billed at different rates depending on your insurance coverage. During this emergency period, for some insurers they may be billed the same as an in-person visit.  Please reach out to your insurance provider with any questions.    If during the course of the call the physician/provider feels a telephone visit is not appropriate, you will not be charged for this service.\"    Patient has given verbal consent to a Telephone visit? Yes    Suman Nagy complains of    Chief Complaint   Patient presents with     Follow-up     Checkup on blood pressure and other medical issues. Pt. notes BP's in 150's/70's, this morning BP was 158/72.        I have reviewed and updated the patient's Past Medical History, Social History, Family History and Medication List.    ALLERGIES  Cefazolin; Pravastatin; Simvastatin; and Thiazides    Additional provider notes:    [facility] phone consult    Suman Nagy   80 y.o. male    Date of Visit: 4/8/2020    Chief Complaint   Patient presents with     Follow-up     Checkup on blood pressure and other medical issues. Pt. notes BP's in 150's/70's, this morning BP was 158/72.      Subjective  Luke requested a consult by phone for diabetic and blood pressure follow-up, during the current coronavirus outbreak.    I did speak with patient as " well as the wife, Jeni for additional history.    Patient was hospitalized in January for influenza A, and then TCU.    With acute illness patient had significant anemia down to hemoglobin 7.4 but no transfusion and on February 5 at his clinic follow-up hemoglobin was up to 9.6.    On February 5 creatinine was at baseline 2.4.    His blood pressure has been running in the 150s over 70s, denies orthostasis or worsening edema.    He saw nephrology earlier this winter.    He is now on hydralazine 75 mg 4 times daily.  Still on Lasix 40 mg daily.  His clonidine had been increased to 0.15 mg 3 times a day.  Still on amlodipine 10 mg and the Toprol XL 25 mg a day.    Diabetes type 2 with hemoglobin A1c last October of 7.6%.  No longer on metformin with his renal failure.  He is now on Lantus insulin 16 units in the morning and Amaryl 4 mg with lunch and supper.    He snacks all day.  His blood sugars during the day are running 2-300.  His blood sugar in the morning is running 103 this morning but 75 yesterday and the day before was 91.    Past history of stage II colon cancer in 2017.  August 2019 CT scan abdomen negative.  His colonoscopy has been on hold with his other medical issues and now the coronavirus outbreak.  November 2019 CEA was up to 6.4.  Previous 3.8.    History of bladder cancer with TURBT in 2015.  No new urinary complaints now.  Cystoscopy negative in September of last year.  He had a cystoscopy scheduled for this spring but that is been changed to June because of the coronavirus outbreak.    Past history of stroke in 2015 with left hemiparesis.  Still on Lipitor and Plavix.    Ultrasound May 2018 left 50-69%.  Right CEA without restenosis.    No new cough or fever currently.    PMHx:    Past Medical History:   Diagnosis Date     Anemia      Bladder cancer (H)      Cancer (H)     Rectosigmoid     Diabetes mellitus (H)      Hyperkalemia      Hypertension      Seizure (H)     possible, one time  occurence     Stroke (H)     multiple, residual left sided weakness, last CVA in July 2015 caused some speech deficit     Superficial phlebitis      Venous insufficiency of both lower extremities      PSHx:    Past Surgical History:   Procedure Laterality Date     COLON SURGERY      Colectomy Low Anterior Resection     EYE SURGERY      Cataract Extraction     LAPAROSCOPIC COLON RESECTION N/A 6/1/2017    Procedure: LAPAROSCOPIC ASSISTED COLECTOMY LOW ANTERIOR RESECTION AND DIVERTING LOOP ILEOSTOMY, PROCTOSOPY;  Surgeon: Tyson Parry MD;  Location: [facility];  Service:      TX CLOSE ENTEROSTOMY N/A 8/15/2017    Procedure: ILEOSTOMY CLOSURE LOOP ;  Surgeon: Tyson Parry MD;  Location: [facility];  Service: General     TX CYSTOURETHROSCOPY,FULGUR <0.5 CM LESN N/A 9/1/2015    Procedure: CYSTOSCOPY TRANSURETHRAL RESECTION BLADDER TUMOR;  Surgeon: Cleveland Nair MD;  Location: [facility];  Service: Urology     TX CYSTOURETHROSCOPY,FULGUR <0.5 CM LESN N/A 12/22/2015    Procedure: CYSTOSCOPY TRANSURETHRAL RESECTION BLADDER TUMOR AND BLADDER BIOPSIES;  Surgeon: Cleveland Nair MD;  Location: [facility];  Service: Urology     TURBT      X2     VASECTOMY       Immunizations:   Immunization History   Administered Date(s) Administered     Influenza [id number], 01/18/2010     Influenza high dose,seasonal,PF, 65+ yrs 09/15/2015, 09/26/2016, 10/03/2017, 09/27/2018, 10/19/2019     Influenza, Seasonal, Inj PF IIV3 09/27/2010, 09/14/2012, 09/27/2013     Influenza, inj, historic,unspecified 10/19/2007, 09/16/2011, 10/15/2015     Influenza, seasonal,quad inj 6-35 mos 09/18/2009, 10/17/2014     Pneumo Conj 13-V (2010&after) 01/20/2015     Pneumo Polysac 23-V 10/08/2004     Td,adult,historic,unspecified 07/01/2004     Tdap 05/20/2015       ROS A comprehensive review of systems was performed and was otherwise negative    Medications, allergies, and problem list were reviewed and  updated    Exam  There were no vitals taken for this visit.  Phone consult    Assessment/Plan  1. Type 2 diabetes mellitus without complication, without long-term current use of insulin (H)  High blood sugars during the day with inactivity and snacking.  But having low blood sugars in the morning.    I suspect the high blood sugars are from snacking just before his blood sugar was taken.  The wife has a difficulty time keeping him on his diet.    I would like to avoid the risk of low blood sugars with fall or cognitive effects.    Therefore I will reduce his insulin down to 14 units instead of 16 units.  See patient instructions.    Continue the Amaryl at current dosing.    Not on metformin because of the renal insufficiency.    Goal hemoglobin A1c less than 8%.    We will delay his clinic follow-up and labs until the coronavirus outbreak has passed.    I will plan a phone consult next month to discuss his diabetes and other issues.      - insulin glargine (LANTUS SOLOSTAR PEN) 100 unit/mL (3 mL) pen; Inject 14 Units under the skin daily with breakfast.  Dispense: 3 adj dose pen; Refill: 3    2. Hypertension  Adequately controlled.  Severe chronic renal insufficiency.  Severe labile blood pressure in the past.    Continue on current medications.    3. Chronic renal insufficiency, stage 3 (moderate) (H)  Stable on lab check in February    4. History of bladder cancer  Cystoscopy has been delayed till June because the coronavirus outbreak    5. History of colon cancer, stage III  Colonoscopy has not been scheduled, currently on hold with other medical issues and the coronavirus outbreak.    He did have an elevated CEA last November.    6. Anemia, unspecified type  Consistent with acute illness with influenza in January.  Was increasing in February.    Takes B12 supplements and shots.    Follow-up for recheck this summer    7. Carotid artery stenosis, asymptomatic, left  Post stroke in 2015.  No new neurologic  event.    Lipitor and Plavix.    Dysthymia stable on Zoloft 50 mg.        Return in 29 days (on 5/7/2020).   Patient Instructions   Reduce the Lantus insulin down to 14 units in the morning.  If blood sugar starts running above 150 in the morning for more than 2 days, increase the Lantus insulin back to 16 units.    If you get blood sugars less than 80 again, contact me in clinic to discuss further adjustment in diabetes medication.    Contact me by phone if needed, otherwise we will talk on the phone again on May 7 for a phone consult.    Continue current blood pressure medication.  Contact me in clinic by phone if blood pressure is running above 180/90.    Bladder cystoscopy and colonoscopy will be delayed until the summer.    Telly Torre MD        Current Outpatient Medications   Medication Sig Dispense Refill     amLODIPine (NORVASC) 10 MG tablet Take 1 tablet (10 mg total) by mouth daily.       atorvastatin (LIPITOR) 20 MG tablet Take 1 tablet (20 mg total) by mouth daily. 90 tablet 3     blood glucose test strips Check blood sugar 3 times per day. Accu-Chek Guide test striips. 100 strip 3     cholecalciferol, vitamin D3, (VITAMIN D3) 1,000 unit capsule Take 2 capsules (2,000 Units total) by mouth daily. 180 capsule 0     clopidogrel (PLAVIX) 75 mg tablet Take 1 tablet (75 mg total) by mouth daily. 90 tablet 0     cyanocobalamin 1,000 mcg/mL injection Inject 1,000 mcg into the shoulder, thigh, or buttocks every 3 (three) months.              cyanocobalamin, vitamin B-12, (VITAMIN B-12) 1,000 mcg Subl Place 1 tablet (1,000 mcg total) under the tongue daily. 90 tablet 3     diclofenac sodium (VOLTAREN) 1 % Gel APPLY 4 GRAMS TOPICALLY 3 (THREE) TIMES A DAY AS NEEDED. APPLY TO KNEES 100 g 0     furosemide (LASIX) 40 MG tablet Take 40 mg by mouth daily.       generic lancets Fastclix lancets. Check blood sugar 3 times per day. 102 each 3     glimepiride (AMARYL) 4 MG tablet Take 4 mg by mouth twice a day with  "lunch and supper.       hydrALAZINE (APRESOLINE) 25 MG tablet Take 75 mg by mouth 4 (four) times a day.        insulin glargine (LANTUS SOLOSTAR PEN) 100 unit/mL (3 mL) pen Inject 14 Units under the skin daily with breakfast. 3 adj dose pen 3     metoprolol succinate (TOPROL-XL) 25 MG TAKE 1 TABLET (25 MG TOTAL) BY MOUTH DAILY. 90 tablet 2     ONETOUCH ULTRA2 METER Mis AS DIRECTED 1 each 0     pen needle, diabetic (BD ULTRA-FINE JONO PEN NEEDLE) 32 gauge x \" Ndle Use one needle per day to inject insulin. 100 each 4     polyethylene glycol (MIRALAX) 17 gram packet Take 1 packet (17 g total) by mouth daily as needed.  0     sertraline (ZOLOFT) 50 MG tablet Take 50 mg by mouth daily.       acetaminophen (TYLENOL) 325 MG tablet Take 2 tablets (650 mg total) by mouth every 4 (four) hours as needed.  0     cloNIDine HCl (CATAPRES) 0.3 MG tablet Take 0.5 tablets (0.15 mg total) by mouth 3 (three) times a day.  0     omeprazole (PRILOSEC) 20 MG capsule Take 1 capsule (20 mg total) by mouth 2 (two) times a day before meals.  0     No current facility-administered medications for this visit.      Allergies   Allergen Reactions     Cefazolin      \"felt very hot\"     Pravastatin      Increased peripheral neuropathy     Simvastatin      Increased peripheral neuropathy     Thiazides      Other: Gout       Social History     Tobacco Use     Smoking status: Former Smoker     Last attempt to quit: 1995     Years since quittin.6     Smokeless tobacco: Never Used   Substance Use Topics     Alcohol use: No     Frequency: Never     Drug use: No             Phone call duration: 9 minutes    Florinda Osuna [facility]    " Kidney transplant  OCT 2004

## 2021-08-03 ENCOUNTER — TRANSCRIPTION ENCOUNTER (OUTPATIENT)
Age: 61
End: 2021-08-03

## 2021-08-03 ENCOUNTER — APPOINTMENT (OUTPATIENT)
Dept: SURGICAL ONCOLOGY | Facility: HOSPITAL | Age: 61
End: 2021-08-03

## 2021-08-03 ENCOUNTER — RESULT REVIEW (OUTPATIENT)
Age: 61
End: 2021-08-03

## 2021-08-03 VITALS
SYSTOLIC BLOOD PRESSURE: 114 MMHG | OXYGEN SATURATION: 96 % | HEART RATE: 85 BPM | TEMPERATURE: 98 F | RESPIRATION RATE: 19 BRPM | DIASTOLIC BLOOD PRESSURE: 71 MMHG

## 2021-08-03 DIAGNOSIS — E78.5 HYPERLIPIDEMIA, UNSPECIFIED: ICD-10-CM

## 2021-08-03 DIAGNOSIS — N18.4 CHRONIC KIDNEY DISEASE, STAGE 4 (SEVERE): ICD-10-CM

## 2021-08-03 DIAGNOSIS — C44.320 SQUAMOUS CELL CARCINOMA OF SKIN OF UNSPECIFIED PARTS OF FACE: ICD-10-CM

## 2021-08-03 DIAGNOSIS — D84.821 IMMUNODEFICIENCY DUE TO DRUGS: ICD-10-CM

## 2021-08-03 DIAGNOSIS — Z94.0 KIDNEY TRANSPLANT STATUS: ICD-10-CM

## 2021-08-03 DIAGNOSIS — I10 ESSENTIAL (PRIMARY) HYPERTENSION: ICD-10-CM

## 2021-08-03 LAB
ANION GAP SERPL CALC-SCNC: 10 MMOL/L — SIGNIFICANT CHANGE UP (ref 5–17)
BUN SERPL-MCNC: 33 MG/DL — HIGH (ref 7–23)
CALCIUM SERPL-MCNC: 9.1 MG/DL — SIGNIFICANT CHANGE UP (ref 8.4–10.5)
CHLORIDE SERPL-SCNC: 107 MMOL/L — SIGNIFICANT CHANGE UP (ref 96–108)
CO2 SERPL-SCNC: 23 MMOL/L — SIGNIFICANT CHANGE UP (ref 22–31)
COVID-19 SPIKE DOMAIN AB INTERP: POSITIVE
COVID-19 SPIKE DOMAIN ANTIBODY RESULT: 62 U/ML — HIGH
CREAT SERPL-MCNC: 1.97 MG/DL — HIGH (ref 0.5–1.3)
GLUCOSE SERPL-MCNC: 118 MG/DL — HIGH (ref 70–99)
HCT VFR BLD CALC: 39.8 % — SIGNIFICANT CHANGE UP (ref 39–50)
HCV AB S/CO SERPL IA: 0.15 S/CO — SIGNIFICANT CHANGE UP (ref 0–0.99)
HCV AB SERPL-IMP: SIGNIFICANT CHANGE UP
HGB BLD-MCNC: 13.6 G/DL — SIGNIFICANT CHANGE UP (ref 13–17)
MCHC RBC-ENTMCNC: 31.9 PG — SIGNIFICANT CHANGE UP (ref 27–34)
MCHC RBC-ENTMCNC: 34.2 GM/DL — SIGNIFICANT CHANGE UP (ref 32–36)
MCV RBC AUTO: 93.4 FL — SIGNIFICANT CHANGE UP (ref 80–100)
NRBC # BLD: 0 /100 WBCS — SIGNIFICANT CHANGE UP (ref 0–0)
PLATELET # BLD AUTO: 198 K/UL — SIGNIFICANT CHANGE UP (ref 150–400)
POTASSIUM SERPL-MCNC: 5 MMOL/L — SIGNIFICANT CHANGE UP (ref 3.5–5.3)
POTASSIUM SERPL-SCNC: 5 MMOL/L — SIGNIFICANT CHANGE UP (ref 3.5–5.3)
RBC # BLD: 4.26 M/UL — SIGNIFICANT CHANGE UP (ref 4.2–5.8)
RBC # FLD: 13.2 % — SIGNIFICANT CHANGE UP (ref 10.3–14.5)
SARS-COV-2 IGG+IGM SERPL QL IA: 62 U/ML — HIGH
SARS-COV-2 IGG+IGM SERPL QL IA: POSITIVE
SODIUM SERPL-SCNC: 140 MMOL/L — SIGNIFICANT CHANGE UP (ref 135–145)
WBC # BLD: 9.33 K/UL — SIGNIFICANT CHANGE UP (ref 3.8–10.5)
WBC # FLD AUTO: 9.33 K/UL — SIGNIFICANT CHANGE UP (ref 3.8–10.5)

## 2021-08-03 PROCEDURE — 80048 BASIC METABOLIC PNL TOTAL CA: CPT

## 2021-08-03 PROCEDURE — 88342 IMHCHEM/IMCYTCHM 1ST ANTB: CPT | Mod: 26

## 2021-08-03 PROCEDURE — 80053 COMPREHEN METABOLIC PANEL: CPT

## 2021-08-03 PROCEDURE — 85025 COMPLETE CBC W/AUTO DIFF WBC: CPT

## 2021-08-03 PROCEDURE — 88342 IMHCHEM/IMCYTCHM 1ST ANTB: CPT

## 2021-08-03 PROCEDURE — 86769 SARS-COV-2 COVID-19 ANTIBODY: CPT

## 2021-08-03 PROCEDURE — 86850 RBC ANTIBODY SCREEN: CPT

## 2021-08-03 PROCEDURE — 93005 ELECTROCARDIOGRAM TRACING: CPT

## 2021-08-03 PROCEDURE — 87635 SARS-COV-2 COVID-19 AMP PRB: CPT

## 2021-08-03 PROCEDURE — 88305 TISSUE EXAM BY PATHOLOGIST: CPT

## 2021-08-03 PROCEDURE — 99285 EMERGENCY DEPT VISIT HI MDM: CPT | Mod: 25

## 2021-08-03 PROCEDURE — 85027 COMPLETE CBC AUTOMATED: CPT

## 2021-08-03 PROCEDURE — 86900 BLOOD TYPING SEROLOGIC ABO: CPT

## 2021-08-03 PROCEDURE — 14040 TIS TRNFR F/C/C/M/N/A/G/H/F: CPT

## 2021-08-03 PROCEDURE — 21016 RESECT FACE/SCALP TUM 2 CM/>: CPT

## 2021-08-03 PROCEDURE — 36000 PLACE NEEDLE IN VEIN: CPT

## 2021-08-03 PROCEDURE — 86901 BLOOD TYPING SEROLOGIC RH(D): CPT

## 2021-08-03 PROCEDURE — 88305 TISSUE EXAM BY PATHOLOGIST: CPT | Mod: 26

## 2021-08-03 PROCEDURE — 71045 X-RAY EXAM CHEST 1 VIEW: CPT

## 2021-08-03 PROCEDURE — 86803 HEPATITIS C AB TEST: CPT

## 2021-08-03 PROCEDURE — 85610 PROTHROMBIN TIME: CPT

## 2021-08-03 PROCEDURE — 99231 SBSQ HOSP IP/OBS SF/LOW 25: CPT

## 2021-08-03 PROCEDURE — 36415 COLL VENOUS BLD VENIPUNCTURE: CPT

## 2021-08-03 RX ORDER — HYDROMORPHONE HYDROCHLORIDE 2 MG/ML
0.5 INJECTION INTRAMUSCULAR; INTRAVENOUS; SUBCUTANEOUS
Refills: 0 | Status: DISCONTINUED | OUTPATIENT
Start: 2021-08-03 | End: 2021-08-03

## 2021-08-03 RX ORDER — SODIUM CHLORIDE 9 MG/ML
1000 INJECTION, SOLUTION INTRAVENOUS
Refills: 0 | Status: DISCONTINUED | OUTPATIENT
Start: 2021-08-03 | End: 2021-08-03

## 2021-08-03 RX ORDER — CEPHALEXIN 500 MG
1 CAPSULE ORAL
Qty: 10 | Refills: 0
Start: 2021-08-03 | End: 2021-08-07

## 2021-08-03 RX ORDER — HYDROMORPHONE HYDROCHLORIDE 2 MG/ML
1 INJECTION INTRAMUSCULAR; INTRAVENOUS; SUBCUTANEOUS
Refills: 0 | Status: DISCONTINUED | OUTPATIENT
Start: 2021-08-03 | End: 2021-08-03

## 2021-08-03 RX ORDER — OXYCODONE AND ACETAMINOPHEN 5; 325 MG/1; MG/1
1 TABLET ORAL EVERY 4 HOURS
Refills: 0 | Status: DISCONTINUED | OUTPATIENT
Start: 2021-08-03 | End: 2021-08-03

## 2021-08-03 RX ORDER — OXYCODONE AND ACETAMINOPHEN 5; 325 MG/1; MG/1
2 TABLET ORAL EVERY 6 HOURS
Refills: 0 | Status: DISCONTINUED | OUTPATIENT
Start: 2021-08-03 | End: 2021-08-03

## 2021-08-03 RX ORDER — ACETAMINOPHEN 500 MG
2 TABLET ORAL
Qty: 0 | Refills: 0 | DISCHARGE
Start: 2021-08-03

## 2021-08-03 RX ORDER — ACETAMINOPHEN 500 MG
650 TABLET ORAL EVERY 6 HOURS
Refills: 0 | Status: DISCONTINUED | OUTPATIENT
Start: 2021-08-03 | End: 2021-08-03

## 2021-08-03 RX ORDER — OXYCODONE AND ACETAMINOPHEN 5; 325 MG/1; MG/1
1 TABLET ORAL
Qty: 12 | Refills: 0
Start: 2021-08-03 | End: 2021-08-05

## 2021-08-03 RX ORDER — FAMOTIDINE 10 MG/ML
40 INJECTION INTRAVENOUS AT BEDTIME
Refills: 0 | Status: DISCONTINUED | OUTPATIENT
Start: 2021-08-03 | End: 2021-08-03

## 2021-08-03 RX ORDER — ONDANSETRON 8 MG/1
4 TABLET, FILM COATED ORAL ONCE
Refills: 0 | Status: DISCONTINUED | OUTPATIENT
Start: 2021-08-03 | End: 2021-08-03

## 2021-08-03 RX ADMIN — LOSARTAN POTASSIUM 50 MILLIGRAM(S): 100 TABLET, FILM COATED ORAL at 05:52

## 2021-08-03 RX ADMIN — MYCOPHENOLATE MOFETIL 500 MILLIGRAM(S): 250 CAPSULE ORAL at 05:52

## 2021-08-03 RX ADMIN — SODIUM CHLORIDE 50 MILLILITER(S): 9 INJECTION, SOLUTION INTRAVENOUS at 12:22

## 2021-08-03 RX ADMIN — TACROLIMUS 4 MILLIGRAM(S): 5 CAPSULE ORAL at 05:53

## 2021-08-03 NOTE — DISCHARGE NOTE PROVIDER - CARE PROVIDERS DIRECT ADDRESSES
,DirectAddress_Unknown,[email or web address]

## 2021-08-03 NOTE — DISCHARGE NOTE PROVIDER - NSDCCPCAREPLAN_GEN_ALL_CORE_FT
PRINCIPAL DISCHARGE DIAGNOSIS  Diagnosis: Squamous cell carcinoma of skin of face  Assessment and Plan of Treatment: for wide excision squamous cell and closure by plastics

## 2021-08-03 NOTE — DISCHARGE NOTE PROVIDER - NSDCDCMDCOMP_GEN_ALL_CORE
EKG was handed to [facility],  [facility]. This document is complete and the patient is ready for discharge.

## 2021-08-03 NOTE — DISCHARGE NOTE NURSING/CASE MANAGEMENT/SOCIAL WORK - PATIENT PORTAL LINK FT
You can access the FollowMyHealth Patient Portal offered by [facility] by registering at the following website: [email or web address]. By joining [facility]’s FollowMyHealth portal, you will also be able to view your health information using other applications (apps) compatible with our system.

## 2021-08-03 NOTE — DISCHARGE NOTE PROVIDER - NSDCMRMEDTOKEN_GEN_ALL_CORE_FT
famotidine 40 mg oral tablet: 1 tab(s) orally once a day (at bedtime)  Flomax 0.4 mg oral capsule: 1 cap(s) orally once a day  Keflex 500 mg oral capsule: 1 cap(s) orally every 12 hours   Keflex 500 mg oral capsule: 1 cap(s) orally every 12 hours   losartan 50 mg oral tablet: 1 tab(s) orally once a day  mycophenolate mofetil 500 mg oral tablet: 1 tab(s) orally 2 times a day  Nicotinamide Dietary Supplement oral tablet:   Percocet 5 mg-325 mg oral tablet: 1 tab(s) orally every 6 hours MDD:4  tacrolimus 4 mg oral tablet, extended release: 1 tab(s) orally 2 times a day   acetaminophen 325 mg oral tablet: 2 tab(s) orally every 6 hours, As needed, Mild Pain (1 - 3)  famotidine 40 mg oral tablet: 1 tab(s) orally once a day (at bedtime)  Flomax 0.4 mg oral capsule: 1 cap(s) orally once a day  Keflex 500 mg oral capsule: 1 cap(s) orally every 12 hours   Keflex 500 mg oral capsule: 1 cap(s) orally every 12 hours   losartan 50 mg oral tablet: 1 tab(s) orally once a day  mycophenolate mofetil 500 mg oral tablet: 1 tab(s) orally 2 times a day  Nicotinamide Dietary Supplement oral tablet:   Percocet 5 mg-325 mg oral tablet: 1 tab(s) orally every 6 hours MDD:4  tacrolimus 4 mg oral tablet, extended release: 1 tab(s) orally 2 times a day   acetaminophen 325 mg oral tablet: 2 tab(s) orally every 6 hours, As needed, Mild Pain (1 - 3)  famotidine 40 mg oral tablet: 1 tab(s) orally once a day (at bedtime)  Flomax 0.4 mg oral capsule: 1 cap(s) orally once a day  Keflex 500 mg oral capsule: 1 cap(s) orally every 12 hours   losartan 50 mg oral tablet: 1 tab(s) orally once a day  mycophenolate mofetil 500 mg oral tablet: 1 tab(s) orally 2 times a day  Nicotinamide Dietary Supplement oral tablet:   Percocet 5 mg-325 mg oral tablet: 1 tab(s) orally every 6 hours MDD:4  tacrolimus 4 mg oral tablet, extended release: 1 tab(s) orally 2 times a day

## 2021-08-03 NOTE — DISCHARGE NOTE PROVIDER - NSDCFUADDAPPT_GEN_ALL_CORE_FT
follow up with Dr Morgan on Friday Aug 6  call office for time .  Follow up with Dr Madden in approx 2-3 weeks for pathology results   Follow up with Dr Navarro as scheduled for Aug 10

## 2021-08-03 NOTE — DISCHARGE NOTE PROVIDER - NSDCCPTREATMENT_GEN_ALL_CORE_FT
PRINCIPAL PROCEDURE  Procedure: Excision of mass of skull  Findings and Treatment: wide excision of Squamous cell carcinoma of left eyrow with plastic closure

## 2021-08-03 NOTE — DISCHARGE NOTE PROVIDER - CARE PROVIDER_API CALL
Daniel Morgan)  Plastic Surgery; Surgery  [address], [address]  [place] [address]  Phone: [phone]  Fax: [phone]  Scheduled Appointment: 08/06/2021    Rakan Madden)  Surgery  [address] [place] [address]  Phone: [phone]  Fax: [phone]  Follow Up Time: 2 weeks

## 2021-08-03 NOTE — DISCHARGE NOTE PROVIDER - PROVIDER TOKENS
PROVIDER:[TOKEN:[[id number]],SCHEDULEDAPPT:[08/06/2021]],PROVIDER:[TOKEN:[[id number]],FOLLOWUP:[2 weeks]]

## 2021-08-03 NOTE — PROGRESS NOTE ADULT - SUBJECTIVE AND OBJECTIVE BOX
Patient admitted yesterday for open wound from Moh's surgery for SCC of the area above the left eyebrow. The wound was unable to be closed and has been open since last Wednesday. Today the patient is feeling otherwise well and is scheduled to go to the OR for plastiv closure by Farida Morgan and [place].         REVIEW OF SYSTEMS:    CONSTITUTIONAL: No weakness, fevers or chills  EYES/ENT: No visual changes;  No vertigo or throat pain   NECK: No pain or stiffness  RESPIRATORY: No cough, wheezing, hemoptysis; No shortness of breath  CARDIOVASCULAR: No chest pain or palpitations  GASTROINTESTINAL: No abdominal or epigastric pain. No nausea, vomiting, or hematemesis; No diarrhea or constipation. No melena or hematochezia.  GENITOURINARY: No dysuria, frequency or hematuria  NEUROLOGICAL: No numbness or weakness  SKIN: open wound above the left eyebrow   All other review of systems is negative unless indicated above    Vital Signs Last 24 Hrs  T(C): 36.9 (03 Aug 2021 08:08), Max: 37.1 (02 Aug 2021 18:13)  T(F): 98.4 (03 Aug 2021 08:08), Max: 98.8 (02 Aug 2021 18:13)  HR: 76 (03 Aug 2021 08:08) (71 - 105)  BP: 102/67 (03 Aug 2021 08:08) (102/67 - 152/84)  BP(mean): --  RR: 18 (03 Aug 2021 08:08) (16 - 18)  SpO2: 96% (03 Aug 2021 08:08) (96% - 98%)      PHYSICAL EXAM:    Constitutional: NAD, awake and alert, well-developed  HEENT: Normal Hearing, MMM  Neck: Soft and supple, No LAD, No JVD  Respiratory: Breath sounds are clear bilaterally, No wheezing, rales or rhonchi  Cardiovascular: S1 and S2, regular rate and rhythm, no Murmurs, gallops or rubs  Vascular: 2+ peripheral pulses  Neurological: A/O x 3, no focal deficits    Skin: wound open as described on admission    MEDICATIONS:  MEDICATIONS  (STANDING):  famotidine    Tablet 40 milliGRAM(s) Oral at bedtime  losartan 50 milliGRAM(s) Oral daily  mycophenolate mofetil 500 milliGRAM(s) Oral two times a day  tacrolimus 4 milliGRAM(s) Oral <User Schedule>  tamsulosin 0.4 milliGRAM(s) Oral at bedtime      LABS: All Labs Reviewed:                        13.6   9.33  )-----------( 198      ( 03 Aug 2021 07:00 )             39.8     08-02    139  |  107  |  34<H>  ----------------------------<  130<H>  4.8   |  24  |  1.92<H>    Ca    9.4      02 Aug 2021 14:35    TPro  7.5  /  Alb  4.2  /  TBili  0.7  /  DBili  x   /  AST  26  /  ALT  33  /  AlkPhos  72  08-02    PT/INR - ( 02 Aug 2021 14:35 )   PT: 12.8 sec;   INR: 1.06 ratio        DVT PPX:     ADVANCED DIRECTIVE: none    DISPOSITION: home after this procedure today per plastic surgery with follow up in my office as scheduled next week.

## 2021-08-03 NOTE — DISCHARGE NOTE PROVIDER - HOSPITAL COURSE
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 He reports undergoing surgery for previous skin cancer there in 2018, followed by radiation with Dr Isiah Ramirez. He recently underwent Mohs surgery with Dr Stefano Espino but was referred for surgery due to the extent of the cancer. He denies any complaints at this time, no CP, SOB, abdominal pain, N/V/D. He ambulates without dyspnea or difficulty. Denies issues with anesthesia in the past.    In the ED, he was initially slightly tachycardic at 105 bpm, 84 bpm on repeat, and the rest of his VS were stable. Labs showed BUN/Cr 34/1.92 and eGFR 37, otherwise unremarkable. CXR was clear on prelim review. EKG showed NSR.  The patient was admitted and pre op for surgery .   The patient underwent the scheduled procedure today .   Post op has been uneventful.  He is tolerating  a diet , ambulating, voiding.  His pain is manageable .   He has   been given post op instructions and he will be discharged to home this evening.

## 2021-08-03 NOTE — DISCHARGE NOTE PROVIDER - NSDCFUADDINST_GEN_ALL_CORE_FT
no driving for at least 48 hours and if not taking narcotics   Sleep with head elevated on pillows for comfort and to decrease swelling   No showering directly on incision until cleared by Dr Morgan  Sponge bathe only   Take pain medication and antibiotics as prescribed.  Take Percocet for moderate to severe pain , if pain   mild take only tylenol   If taking narcotics, take a stool softener or laxative to avoid constipation if no BM

## 2021-08-03 NOTE — PROGRESS NOTE ADULT - ASSESSMENT
open wound secondary to SCC treated with Moh's surgery in patient on immunosuppressive therapy for renal transplant- remote, hypertension, CKD3B, hyperglycemia with prediabetes, no evidence for infection at this time but timely closure needed to prevent infection.

## 2021-08-05 ENCOUNTER — NON-APPOINTMENT (OUTPATIENT)
Age: 61
End: 2021-08-05

## 2021-08-09 LAB — SURGICAL PATHOLOGY STUDY: SIGNIFICANT CHANGE UP

## 2021-08-10 ENCOUNTER — APPOINTMENT (OUTPATIENT)
Dept: FAMILY MEDICINE | Facility: CLINIC | Age: 61
End: 2021-08-10
Payer: COMMERCIAL

## 2021-08-10 VITALS
OXYGEN SATURATION: 98 % | BODY MASS INDEX: 30.52 KG/M2 | WEIGHT: 218 LBS | RESPIRATION RATE: 14 BRPM | TEMPERATURE: 97.4 F | DIASTOLIC BLOOD PRESSURE: 80 MMHG | HEIGHT: 71 IN | SYSTOLIC BLOOD PRESSURE: 120 MMHG | HEART RATE: 88 BPM

## 2021-08-10 PROCEDURE — 99396 PREV VISIT EST AGE 40-64: CPT

## 2021-08-10 RX ORDER — CEPHALEXIN 500 MG/1
500 CAPSULE ORAL
Qty: 10 | Refills: 0 | Status: COMPLETED | COMMUNITY
Start: 2021-08-03

## 2021-08-10 RX ORDER — POLYETHYLENE GLYCOL 3350, SODIUM SULFATE, SODIUM CHLORIDE, POTASSIUM CHLORIDE, SODIUM ASCORBATE, AND ASCORBIC ACID 7.5-2.691G
100 KIT ORAL
Qty: 1 | Refills: 0 | Status: COMPLETED | COMMUNITY
Start: 2021-07-07

## 2021-08-10 RX ORDER — OXYCODONE AND ACETAMINOPHEN 5; 325 MG/1; MG/1
5-325 TABLET ORAL
Qty: 12 | Refills: 0 | Status: DISCONTINUED | COMMUNITY
Start: 2021-08-03

## 2021-08-11 ENCOUNTER — OUTPATIENT (OUTPATIENT)
Dept: OUTPATIENT SERVICES | Facility: HOSPITAL | Age: 61
LOS: 1 days | End: 2021-08-11
Payer: COMMERCIAL

## 2021-08-11 DIAGNOSIS — Z85.828 PERSONAL HISTORY OF OTHER MALIGNANT NEOPLASM OF SKIN: Chronic | ICD-10-CM

## 2021-08-11 DIAGNOSIS — Z94.0 KIDNEY TRANSPLANT STATUS: Chronic | ICD-10-CM

## 2021-08-11 DIAGNOSIS — C44.329 SQUAMOUS CELL CARCINOMA OF SKIN OF OTHER PARTS OF FACE: ICD-10-CM

## 2021-08-13 ENCOUNTER — RESULT REVIEW (OUTPATIENT)
Age: 61
End: 2021-08-13

## 2021-08-13 PROCEDURE — 88321 CONSLTJ&REPRT SLD PREP ELSWR: CPT

## 2021-08-13 NOTE — HEALTH RISK ASSESSMENT
[Very Good] : ~his/her~  mood as very good [HIV test declined] : HIV test declined [Employed] : employed [] :  [# Of Children ___] : has [unfilled] children [Sexually Active] : sexually active [FreeTextEntry1] : his multiple basal cell cancers on his face.  [de-identified] : plastic surgery post dc from procedure [de-identified] : not a lot of regular exercise, some walking  [de-identified] : tries to eat healthy and does avoid the snacks [Change in mental status noted] : No change in mental status noted [None] : None [With Family] : lives with family [# of Members in Household ___] :  household currently consist of [unfilled] member(s) [High Risk Behavior] : no high risk behavior [Fully functional (bathing, dressing, toileting, transferring, walking, feeding)] : Fully functional (bathing, dressing, toileting, transferring, walking, feeding) [Fully functional (using the telephone, shopping, preparing meals, housekeeping, doing laundry, using] : Fully functional and needs no help or supervision to perform IADLs (using the telephone, shopping, preparing meals, housekeeping, doing laundry, using transportation, managing medications and managing finances) [Reports changes in hearing] : Reports no changes in hearing [Reports changes in vision] : Reports no changes in vision [Reports normal functional visual acuity (ie: able to read med bottle)] : Reports normal functional visual acuity [Reports changes in dental health] : Reports no changes in dental health [Smoke Detector] : smoke detector [Carbon Monoxide Detector] : carbon monoxide detector [Safety elements used in home] : safety elements used in home [Seat Belt] :  uses seat belt [Sunscreen] : uses sunscreen [de-identified] : none [FreeTextEntry2] : college  [de-identified] : 1 partner mongomous- wife  [Patient/Caregiver not ready to engage] : , patient/caregiver not ready to engage [AdvancecareDate] : 8/21

## 2021-08-13 NOTE — PHYSICAL EXAM
[Well Nourished] : well nourished [Well Developed] : well developed [Normal Sclera/Conjunctiva] : normal sclera/conjunctiva [PERRL] : pupils equal round and reactive to light [Fundoscopic Exam Performed] : fundoscopic ~T exam ~C was performed [Normal Outer Ear/Nose] : the outer ears and nose were normal in appearance [Normal Oropharynx] : the oropharynx was normal [Normal TMs] : both tympanic membranes were normal [No JVD] : no jugular venous distention [Supple] : supple [Thyroid Normal, No Nodules] : the thyroid was normal and there were no nodules present [No Respiratory Distress] : no respiratory distress  [No Accessory Muscle Use] : no accessory muscle use [Clear to Auscultation] : lungs were clear to auscultation bilaterally [Normal Rate] : normal rate  [Regular Rhythm] : with a regular rhythm [Normal S1, S2] : normal S1 and S2 [No Murmur] : no murmur heard [Pedal Pulses Present] : the pedal pulses are present [No Edema] : there was no peripheral edema [No Palpable Aorta] : no palpable aorta [No Extremity Clubbing/Cyanosis] : no extremity clubbing/cyanosis [Normal Appearance] : normal in appearance [Soft] : abdomen soft [Non Tender] : non-tender [Non-distended] : non-distended [No Masses] : no abdominal mass palpated [No HSM] : no HSM [Declined Rectal Exam] : declined rectal exam [Normal Supraclavicular Nodes] : no supraclavicular lymphadenopathy [Normal Posterior Cervical Nodes] : no posterior cervical lymphadenopathy [Normal Anterior Cervical Nodes] : no anterior cervical lymphadenopathy [No CVA Tenderness] : no CVA  tenderness [No Joint Swelling] : no joint swelling [No Skin Lesions] : no skin lesions [No Focal Deficits] : no focal deficits [Normal Gait] : normal gait [Normal Affect] : the affect was normal [Alert and Oriented x3] : oriented to person, place, and time [Normal Insight/Judgement] : insight and judgment were intact [de-identified] : obese [FreeTextEntry1] : defer to urology [de-identified] : defer to urology [de-identified] : scar secondary to renal transplant [de-identified] : derm follow up annually

## 2021-08-13 NOTE — CURRENT MEDS
[Takes medication as prescribed] : takes [None] : Patient does not have any barriers to medication adherence [Yes] : Reviewed medication list for presence of high-risk medications. [FreeTextEntry1] : none no

## 2021-08-13 NOTE — REASON FOR VISIT
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

## 2021-08-13 NOTE — REVIEW OF SYSTEMS
[Back Pain] : back pain [Negative] : Heme/Lymph [FreeTextEntry9] : mild rare low back stiffness on occasion [de-identified] : well healed excisional surgery and graft of squamous cell cancer of the left temple, recent surgery ofr basal cell of the left eyebrow

## 2021-08-25 ENCOUNTER — APPOINTMENT (OUTPATIENT)
Dept: SURGICAL ONCOLOGY | Facility: CLINIC | Age: 61
End: 2021-08-25
Payer: COMMERCIAL

## 2021-08-25 VITALS
DIASTOLIC BLOOD PRESSURE: 87 MMHG | WEIGHT: 215 LBS | HEIGHT: 71 IN | OXYGEN SATURATION: 97 % | RESPIRATION RATE: 16 BRPM | SYSTOLIC BLOOD PRESSURE: 127 MMHG | HEART RATE: 72 BPM | BODY MASS INDEX: 30.1 KG/M2

## 2021-08-25 PROCEDURE — 99024 POSTOP FOLLOW-UP VISIT: CPT

## 2021-08-25 NOTE — ADDENDUM
[FreeTextEntry1] : I, Myra Bob, acted solely as a scribe for Dr. Rakan Madden on this date 08/25/2021.\par \par

## 2021-08-25 NOTE — HISTORY OF PRESENT ILLNESS
[de-identified] : Pt is a 59 y/o male who presents a post-op visit for squamous cell carcinoma of left eyebrow. S/p excision 8/3/21. \par He was seen by Dr. Espino who performed Mohs surgery  but stopped the procedure due to a persistently positive deep margin.  The pt. has a hx or a renal transplant on immunosuppression and had a prior SCC resected from the left zygomatic region with adjuvant radiation tx.\par \par \par Pathology (8/13/21): \par Left mid eyebrow ([id number], part1, 1 slide), shave biopsy: \par - Squamous cell carcinoma, invasive, keratoacanthoma type, extending to base of biopsy. \par \par Pathology (8/3/21):\par 1. Skin, left eyebrow, excision \par - Minute residual foci of invasive squamous cell carcinoma, moderately differentiated. \par - Marked ulceration, inflammatory changes and necroinflammatory debris. \par - No lymphovascular invasion or perineural invasion identified. \par - The resection margins are negative for carcinoma. The tumor is 0.2 cm from the nearest margin (lateral). \par 2. Skin, left eyebrow, additional deep margin, biopsy \par - Fibroadipose tissue negative for carcinoma. \par 3. Skin, left eyebrow, additional superior margin, biopsy \par - Skin with focal perineural invasion within the reticular dermis (see comment). \par - No other invasive in situ carcinoma identified. \par \par MRI Orbits (7/31/21): Soft tissue defect overlying the left frontal scalp, consistent with history of neoplasm resection. Soft tissue swelling and enhancement of the left periorbital, premaxillary, and left frontal extracalvarial soft tissues, likely postsurgical in nature. Underlying osseous structures and intraorbital contents unremarkable. No evidence for osseous or orbital metastases. \par \par \par Dermatopathology Report (7/14/21):\par Left Mid Eyebrow\par -Squamous cell carcinoma, keratoacanthoma type; transected \par \par PMHx: HTN, SCC, Renal transplant (from wife)- 2004, Allergies to Zithromax

## 2021-08-25 NOTE — ASSESSMENT
[FreeTextEntry1] : Left mid eyebrow SCC \par S/p Mohs surgery w/ positive margins and perineural invasion \par History of renal transplant on immunosuppression\par Patient will see radiation oncology \par Will refer to Dr. Sanchez of medical oncology \par Continue follow up with dermatology\par RTO 3 months \par \par \par \par \par Enclosed pathology report.\par \par

## 2021-08-25 NOTE — PHYSICAL EXAM
[Normal] : supple, no neck mass and thyroid not enlarged [Normal Neck Lymph Nodes] : normal neck lymph nodes  [Normal Supraclavicular Lymph Nodes] : normal supraclavicular lymph nodes [Normal Groin Lymph Nodes] : normal groin lymph nodes [Normal Axillary Lymph Nodes] : normal axillary lymph nodes [Normal] : oriented to person, place and time, with appropriate affect [de-identified] : 3 cm open wound left mid eyebrow from recent Mohs surgery. base of wound is clean, no gross tumor noted. wound is mobile and not fixed to underlying bone. left zygomatic prior resection and radiation site well healed without evidence of recurrence.

## 2021-09-11 ENCOUNTER — TRANSCRIPTION ENCOUNTER (OUTPATIENT)
Age: 61
End: 2021-09-11

## 2021-09-11 DIAGNOSIS — Z20.5 CONTACT WITH AND (SUSPECTED) EXPOSURE TO VIRAL HEPATITIS: ICD-10-CM

## 2021-09-26 ENCOUNTER — RESULT REVIEW (OUTPATIENT)
Age: 61
End: 2021-09-26

## 2021-09-26 LAB
APPEARANCE: CLEAR
BACTERIA: NEGATIVE
BILIRUBIN URINE: NEGATIVE
BLOOD URINE: NEGATIVE
COLOR: NORMAL
ESTIMATED AVERAGE GLUCOSE: 108 MG/DL
GLUCOSE QUALITATIVE U: NEGATIVE
HBA1C MFR BLD HPLC: 5.4 %
HYALINE CASTS: 1 /LPF
KETONES URINE: NEGATIVE
LEUKOCYTE ESTERASE URINE: NEGATIVE
MICROSCOPIC-UA: NORMAL
NITRITE URINE: NEGATIVE
PH URINE: 5.5
PROTEIN URINE: NEGATIVE
PSA SERPL-MCNC: 4.8 NG/ML
RED BLOOD CELLS URINE: 1 /HPF
SPECIFIC GRAVITY URINE: 1.02
SQUAMOUS EPITHELIAL CELLS: 0 /HPF
UROBILINOGEN URINE: NORMAL
WHITE BLOOD CELLS URINE: 0 /HPF

## 2021-09-29 ENCOUNTER — RESULT REVIEW (OUTPATIENT)
Age: 61
End: 2021-09-29

## 2021-09-29 LAB — HEV AB SER QL: POSITIVE

## 2021-10-03 ENCOUNTER — RESULT REVIEW (OUTPATIENT)
Age: 61
End: 2021-10-03

## 2021-10-03 ENCOUNTER — TRANSCRIPTION ENCOUNTER (OUTPATIENT)
Age: 61
End: 2021-10-03

## 2021-10-03 LAB — HEPATITIS E IGM ABY: NORMAL

## 2021-10-09 ENCOUNTER — OUTPATIENT (OUTPATIENT)
Dept: OUTPATIENT SERVICES | Facility: HOSPITAL | Age: 61
LOS: 1 days | Discharge: HOME | End: 2021-10-09

## 2021-10-09 DIAGNOSIS — C44.329 SQUAMOUS CELL CARCINOMA OF SKIN OF OTHER PARTS OF FACE: ICD-10-CM

## 2021-10-09 DIAGNOSIS — Z85.828 PERSONAL HISTORY OF OTHER MALIGNANT NEOPLASM OF SKIN: Chronic | ICD-10-CM

## 2021-10-09 DIAGNOSIS — Z94.0 KIDNEY TRANSPLANT STATUS: Chronic | ICD-10-CM

## 2021-10-12 ENCOUNTER — APPOINTMENT (OUTPATIENT)
Dept: HEMATOLOGY ONCOLOGY | Facility: CLINIC | Age: 61
End: 2021-10-12
Payer: COMMERCIAL

## 2021-10-12 VITALS
RESPIRATION RATE: 16 BRPM | SYSTOLIC BLOOD PRESSURE: 118 MMHG | HEIGHT: 71 IN | WEIGHT: 217 LBS | DIASTOLIC BLOOD PRESSURE: 80 MMHG | BODY MASS INDEX: 30.38 KG/M2 | TEMPERATURE: 98.2 F | HEART RATE: 97 BPM | OXYGEN SATURATION: 99 %

## 2021-10-12 DIAGNOSIS — Z87.19 PERSONAL HISTORY OF OTHER DISEASES OF THE DIGESTIVE SYSTEM: ICD-10-CM

## 2021-10-12 DIAGNOSIS — Z80.0 FAMILY HISTORY OF MALIGNANT NEOPLASM OF DIGESTIVE ORGANS: ICD-10-CM

## 2021-10-12 DIAGNOSIS — Z85.828 PERSONAL HISTORY OF OTHER MALIGNANT NEOPLASM OF SKIN: ICD-10-CM

## 2021-10-12 PROCEDURE — 99205 OFFICE O/P NEW HI 60 MIN: CPT

## 2021-10-12 NOTE — HISTORY OF PRESENT ILLNESS
[de-identified] : Mr. Cruz is a 61 year old gentlemen with past medical history of CKD s/p renal transplant on immunosuppression presenting to the office for an initial consultation of cSCC.\par \par Patient underwent Mohs surgery 7/28/21, and due to close margin so had wide excision on 8/3/2021.\par \par Pathology (7/14/21): \par Left mid eyebrow ([id number], part1, 1 slide), shave biopsy: \par - Squamous cell carcinoma, invasive, keratoacanthoma type, extending to base of biopsy. \par (Confirmed at [facility] on 8/13/21)\par \par Pathology (8/3/21):\par 1. Skin, left eyebrow, excision \par - Minute residual foci of invasive squamous cell carcinoma, moderately differentiated. \par - Marked ulceration, inflammatory changes and necroinflammatory debris. \par - No lymphovascular invasion or perineural invasion identified. \par - The resection margins are negative for carcinoma. The tumor is 0.2 cm from the nearest margin (lateral). \par 2. Skin, left eyebrow, additional deep margin, biopsy \par - Fibroadipose tissue negative for carcinoma. \par 3. Skin, left eyebrow, additional superior margin, biopsy \par - Skin with focal perineural invasion within the reticular dermis (see comment). \par - No other invasive in situ carcinoma identified. \par \par MRI Orbits (7/31/21): Soft tissue defect overlying the left frontal scalp, consistent with history of neoplasm resection. Soft tissue swelling and enhancement of the left periorbital, premaxillary, and left frontal extracalvarial soft tissues, likely postsurgical in nature. Underlying osseous structures and intraorbital contents unremarkable. No evidence for osseous or orbital metastases.  [de-identified] : Surgical Oncology: Rakan Madden\par Plastic Surgeon: Daniel Morgan\par Radiation Oncology: Isiah Ramirez [phone]\par Dermatology: Hussein Krause\par PCP: Kalpana Navarro\par Transplant Nephrology: Romario Padgett\par

## 2021-10-12 NOTE — CONSULT LETTER
[Dear  ___] : Dear  [unfilled], [Consult Letter:] : I had the pleasure of evaluating your patient, [unfilled]. [Please see my note below.] : Please see my note below. [Consult Closing:] : Thank you very much for allowing me to participate in the care of this patient.  If you have any questions, please do not hesitate to contact me. [Sincerely,] : Sincerely, [DrNae  ___] : Dr. CHAPMAN [DrNae ___] : Dr. CHAPMAN [___] : [unfilled]

## 2021-10-20 ENCOUNTER — APPOINTMENT (OUTPATIENT)
Dept: SURGICAL ONCOLOGY | Facility: CLINIC | Age: 61
End: 2021-10-20
Payer: COMMERCIAL

## 2021-10-20 VITALS
HEART RATE: 85 BPM | BODY MASS INDEX: 30.1 KG/M2 | HEIGHT: 71 IN | DIASTOLIC BLOOD PRESSURE: 91 MMHG | RESPIRATION RATE: 15 BRPM | OXYGEN SATURATION: 99 % | WEIGHT: 215 LBS | SYSTOLIC BLOOD PRESSURE: 132 MMHG

## 2021-10-20 PROCEDURE — 99024 POSTOP FOLLOW-UP VISIT: CPT

## 2021-10-20 NOTE — PHYSICAL EXAM
[de-identified] : Left eyebrow reconstruction well-healed with good cosmetic result, mild edema of the left upper lateral eyelid, no evidence of recurrence, postradiation changes left temple

## 2021-10-20 NOTE — HISTORY OF PRESENT ILLNESS
[de-identified] : Pt is a 60 y/o male who presents a post-op visit for squamous cell carcinoma of left eyebrow. S/p excision 8/3/21. \par He was seen by Dr. Espino who performed Mohs surgery  but stopped the procedure due to a persistently positive deep margin.  The pt. has a hx or a renal transplant on immunosuppression and had a prior SCC resected from the left zygomatic region with adjuvant radiation tx.\par \par \par Pathology (8/13/21): \par Left mid eyebrow ([id number], part1, 1 slide), shave biopsy: \par - Squamous cell carcinoma, invasive, keratoacanthoma type, extending to base of biopsy. \par \par Pathology (8/3/21):\par 1. Skin, left eyebrow, excision \par - Minute residual foci of invasive squamous cell carcinoma, moderately differentiated. \par - Marked ulceration, inflammatory changes and necroinflammatory debris. \par - No lymphovascular invasion or perineural invasion identified. \par - The resection margins are negative for carcinoma. The tumor is 0.2 cm from the nearest margin (lateral). \par 2. Skin, left eyebrow, additional deep margin, biopsy \par - Fibroadipose tissue negative for carcinoma. \par 3. Skin, left eyebrow, additional superior margin, biopsy \par - Skin with focal perineural invasion within the reticular dermis (see comment). \par - No other invasive in situ carcinoma identified. \par \par Pt. was seen by Dr. Sanchez on 10/12/2021.  Given that the patient had prior RT to the left temple, pt. is unable to have RT again. Adjuvant chemo and immunotherapy is not recommended at this time.  Pt. will undergo close observation. \par \par MRI Orbits (7/31/21): Soft tissue defect overlying the left frontal scalp, consistent with history of neoplasm resection. Soft tissue swelling and enhancement of the left periorbital, premaxillary, and left frontal extracalvarial soft tissues, likely postsurgical in nature. Underlying osseous structures and intraorbital contents unremarkable. No evidence for osseous or orbital metastases. \par \par \par Dermatopathology Report (7/14/21):\par Left Mid Eyebrow\par -Squamous cell carcinoma, keratoacanthoma type; transected \par \par PMHx: HTN, SCC, Renal transplant (from wife)- 2004, Allergies to Zithromax

## 2021-10-20 NOTE — ASSESSMENT
[FreeTextEntry1] : Left mid eyebrow SCC \par S/p Mohs surgery w/ positive margins and perineural invasion \par S/p wide excision of SCC 8/3/2021- negative margins, perineural invasion\par Case discussed with Dr. Sanchez of medical oncology -no role for adjuvant immunotherapy\par Radiation oncologist is hesitant to give additional radiation to the left eye region given his prior radiation therapy\par Continue dermatologic surveillance with Dr. Krause of dermatology\par RTO 3 months\par \par

## 2021-12-15 ENCOUNTER — APPOINTMENT (OUTPATIENT)
Dept: SURGICAL ONCOLOGY | Facility: CLINIC | Age: 61
End: 2021-12-15
Payer: COMMERCIAL

## 2021-12-15 VITALS
RESPIRATION RATE: 16 BRPM | WEIGHT: 211 LBS | HEART RATE: 84 BPM | TEMPERATURE: 98.2 F | BODY MASS INDEX: 29.54 KG/M2 | OXYGEN SATURATION: 99 % | DIASTOLIC BLOOD PRESSURE: 82 MMHG | HEIGHT: 71 IN | SYSTOLIC BLOOD PRESSURE: 136 MMHG

## 2021-12-15 PROCEDURE — 99215 OFFICE O/P EST HI 40 MIN: CPT

## 2021-12-15 NOTE — PHYSICAL EXAM
[de-identified] : Left eyebrow reconstruction well-healed , 5 mm nodule at the edge of his left temple skin graft , no other suspicious skin lesions

## 2021-12-15 NOTE — HISTORY OF PRESENT ILLNESS
[de-identified] : Pt is a 62 y/o male who presents for a follow up visit. \par He was recently diagnosed with a new squamous cell carcinoma at the edge of his left temple skin graft from a prior resection on biopsy by Dr. Morgan of plastic surgery.\par \par S/p squamous cell carcinoma of left eyebrow excision 8/3/21. \par He was seen by Dr. Espino who performed Mohs surgery  but stopped the procedure due to a persistently positive deep margin.  The pt. has a hx or a renal transplant on immunosuppression and had a prior SCC resected from the left zygomatic region with adjuvant radiation tx.\par \par Pathology (8/13/21): \par Left mid eyebrow ([id number], part1, 1 slide), shave biopsy: \par - Squamous cell carcinoma, invasive, keratoacanthoma type, extending to base of biopsy. \par \par Pathology (8/3/21):\par 1. Skin, left eyebrow, excision \par - Minute residual foci of invasive squamous cell carcinoma, moderately differentiated. \par - Marked ulceration, inflammatory changes and necroinflammatory debris. \par - No lymphovascular invasion or perineural invasion identified. \par - The resection margins are negative for carcinoma. The tumor is 0.2 cm from the nearest margin (lateral). \par 2. Skin, left eyebrow, additional deep margin, biopsy \par - Fibroadipose tissue negative for carcinoma. \par 3. Skin, left eyebrow, additional superior margin, biopsy \par - Skin with focal perineural invasion within the reticular dermis (see comment). \par - No other invasive in situ carcinoma identified. \par \par Pt. was seen by Dr. Sanchez on 10/12/2021.  Given that the patient had prior RT to the left temple, pt. is unable to have RT again. Adjuvant chemo and immunotherapy is not recommended at this time.  Pt. will undergo close observation. \par \par MRI Orbits (7/31/21): Soft tissue defect overlying the left frontal scalp, consistent with history of neoplasm resection. Soft tissue swelling and enhancement of the left periorbital, premaxillary, and left frontal extracalvarial soft tissues, likely postsurgical in nature. Underlying osseous structures and intraorbital contents unremarkable. No evidence for osseous or orbital metastases. \par \par \par Dermatopathology Report (7/14/21):\par Left Mid Eyebrow\par -Squamous cell carcinoma, keratoacanthoma type; transected \par \par PMHx: HTN, SCC, Renal transplant (from wife)- 2004, Allergies to Zithromax

## 2021-12-23 ENCOUNTER — NON-APPOINTMENT (OUTPATIENT)
Age: 61
End: 2021-12-23

## 2021-12-23 ENCOUNTER — TRANSCRIPTION ENCOUNTER (OUTPATIENT)
Age: 61
End: 2021-12-23

## 2021-12-27 ENCOUNTER — TRANSCRIPTION ENCOUNTER (OUTPATIENT)
Age: 61
End: 2021-12-27

## 2021-12-28 ENCOUNTER — OUTPATIENT (OUTPATIENT)
Dept: OUTPATIENT SERVICES | Facility: HOSPITAL | Age: 61
LOS: 1 days | End: 2021-12-28
Payer: COMMERCIAL

## 2021-12-28 VITALS
WEIGHT: 216.49 LBS | HEART RATE: 85 BPM | RESPIRATION RATE: 18 BRPM | OXYGEN SATURATION: 98 % | TEMPERATURE: 97 F | HEIGHT: 71 IN | DIASTOLIC BLOOD PRESSURE: 78 MMHG | SYSTOLIC BLOOD PRESSURE: 120 MMHG

## 2021-12-28 DIAGNOSIS — Z01.818 ENCOUNTER FOR OTHER PREPROCEDURAL EXAMINATION: ICD-10-CM

## 2021-12-28 DIAGNOSIS — C44.92 SQUAMOUS CELL CARCINOMA OF SKIN, UNSPECIFIED: ICD-10-CM

## 2021-12-28 DIAGNOSIS — I10 ESSENTIAL (PRIMARY) HYPERTENSION: ICD-10-CM

## 2021-12-28 DIAGNOSIS — Z98.890 OTHER SPECIFIED POSTPROCEDURAL STATES: Chronic | ICD-10-CM

## 2021-12-28 DIAGNOSIS — Z94.0 KIDNEY TRANSPLANT STATUS: Chronic | ICD-10-CM

## 2021-12-28 DIAGNOSIS — Z85.828 PERSONAL HISTORY OF OTHER MALIGNANT NEOPLASM OF SKIN: Chronic | ICD-10-CM

## 2021-12-28 DIAGNOSIS — K21.9 GASTRO-ESOPHAGEAL REFLUX DISEASE WITHOUT ESOPHAGITIS: ICD-10-CM

## 2021-12-28 LAB
ANION GAP SERPL CALC-SCNC: 12 MMOL/L — SIGNIFICANT CHANGE UP (ref 5–17)
BUN SERPL-MCNC: 31 MG/DL — HIGH (ref 7–23)
CALCIUM SERPL-MCNC: 9.6 MG/DL — SIGNIFICANT CHANGE UP (ref 8.4–10.5)
CHLORIDE SERPL-SCNC: 104 MMOL/L — SIGNIFICANT CHANGE UP (ref 96–108)
CO2 SERPL-SCNC: 24 MMOL/L — SIGNIFICANT CHANGE UP (ref 22–31)
CREAT SERPL-MCNC: 2.18 MG/DL — HIGH (ref 0.5–1.3)
GLUCOSE SERPL-MCNC: 126 MG/DL — HIGH (ref 70–99)
HCT VFR BLD CALC: 43.6 % — SIGNIFICANT CHANGE UP (ref 39–50)
HGB BLD-MCNC: 14.5 G/DL — SIGNIFICANT CHANGE UP (ref 13–17)
MCHC RBC-ENTMCNC: 31.5 PG — SIGNIFICANT CHANGE UP (ref 27–34)
MCHC RBC-ENTMCNC: 33.3 GM/DL — SIGNIFICANT CHANGE UP (ref 32–36)
MCV RBC AUTO: 94.8 FL — SIGNIFICANT CHANGE UP (ref 80–100)
NRBC # BLD: 0 /100 WBCS — SIGNIFICANT CHANGE UP (ref 0–0)
PLATELET # BLD AUTO: 240 K/UL — SIGNIFICANT CHANGE UP (ref 150–400)
POTASSIUM SERPL-MCNC: 5 MMOL/L — SIGNIFICANT CHANGE UP (ref 3.5–5.3)
POTASSIUM SERPL-SCNC: 5 MMOL/L — SIGNIFICANT CHANGE UP (ref 3.5–5.3)
RBC # BLD: 4.6 M/UL — SIGNIFICANT CHANGE UP (ref 4.2–5.8)
RBC # FLD: 13.1 % — SIGNIFICANT CHANGE UP (ref 10.3–14.5)
SODIUM SERPL-SCNC: 140 MMOL/L — SIGNIFICANT CHANGE UP (ref 135–145)
WBC # BLD: 7.67 K/UL — SIGNIFICANT CHANGE UP (ref 3.8–10.5)
WBC # FLD AUTO: 7.67 K/UL — SIGNIFICANT CHANGE UP (ref 3.8–10.5)

## 2021-12-28 PROCEDURE — 85027 COMPLETE CBC AUTOMATED: CPT

## 2021-12-28 PROCEDURE — G0463: CPT

## 2021-12-28 PROCEDURE — 80048 BASIC METABOLIC PNL TOTAL CA: CPT

## 2021-12-28 PROCEDURE — 36415 COLL VENOUS BLD VENIPUNCTURE: CPT

## 2021-12-28 NOTE — H&P PST ADULT - NSANTHOSAYNRD_GEN_A_CORE
neck-  16 inches/No. KUMAR screening performed.  STOP BANG Legend: 0-2 = LOW Risk; 3-4 = INTERMEDIATE Risk; 5-8 = HIGH Risk

## 2021-12-28 NOTE — H&P PST ADULT - HISTORY OF PRESENT ILLNESS
62 y/o male with PMH of HTN, HLD, BPH, GERD,  squamous cell carcinoma ( 2018- s/p radiation and excision, 20201- s/p excision )and right kidney transplant presents for PST.  C/O of new onset of squamous cell carcinoma of left temple above eye lid.  Scheduled for wide excision left temple squamous cell carcinoma with Dr Madden and Dr oMrgan on 01/04/2021.  COVID-19 testing scheduled for 01/02/2021 @ 8 am in [facility]

## 2021-12-28 NOTE — H&P PST ADULT - PROBLEM SELECTOR PLAN 1
Scheduled for wide excision left temple squamous cell carcinoma with Dr Madden and Dr Morgan on 01/04/2021.  Pre op instructions given and patient verbalized understanding.  CBC, BMP and medical clearance pending.  Chest x ray and EKG on chart.  KUMAR precautions.  NPO after midnight night before surgery.  May take anti hypertensive medication with small sip of water AM of procedure.  To stop all ASA, NSAIDs, vitamins and supplements 1 week prior to surgery.

## 2021-12-28 NOTE — H&P PST ADULT - NEGATIVE ENMT SYMPTOMS
no hearing difficulty/no ear pain/no nasal congestion/no nasal discharge/no nasal obstruction/no post-nasal discharge/no nose bleeds/no throat pain

## 2021-12-28 NOTE — H&P PST ADULT - FALL HARM RISK - UNIVERSAL INTERVENTIONS
Bed in lowest position, wheels locked, appropriate side rails in place/Call bell, personal items and telephone in reach/Instruct patient to call for assistance before getting out of bed or chair/Non-slip footwear when patient is out of bed/[place] to call system/Physically safe environment - no spills, clutter or unnecessary equipment/Purposeful Proactive Rounding/Room/bathroom lighting operational, light cord in reach

## 2021-12-28 NOTE — H&P PST ADULT - NEGATIVE GENERAL GENITOURINARY SYMPTOMS
no hematuria/no renal colic/no bladder infections/no incontinence/no dysuria/no urinary hesitancy/normal urinary frequency

## 2021-12-28 NOTE — H&P PST ADULT - NSICDXPASTSURGICALHX_GEN_ALL_CORE_FT
PAST SURGICAL HISTORY:  H/O kidney transplant right, 2004    H/O Mohs micrographic surgery for skin cancer     H/O squamous cell carcinoma excision 2018, 2021

## 2021-12-28 NOTE — H&P PST ADULT - NSICDXPASTMEDICALHX_GEN_ALL_CORE_FT
PAST MEDICAL HISTORY:  BPH (benign prostatic hyperplasia)     COVID-19 3/2020 - not hospitalized    HLD (hyperlipidemia) no longer on medication    HTN (hypertension)     IFG (impaired fasting glucose)     Nephronophthisis     Squamous cell carcinoma of skin 2018 radiation and surgery, surgery 2021

## 2021-12-29 ENCOUNTER — APPOINTMENT (OUTPATIENT)
Dept: FAMILY MEDICINE | Facility: CLINIC | Age: 61
End: 2021-12-29
Payer: COMMERCIAL

## 2021-12-29 VITALS
HEART RATE: 84 BPM | RESPIRATION RATE: 16 BRPM | SYSTOLIC BLOOD PRESSURE: 135 MMHG | BODY MASS INDEX: 30.54 KG/M2 | OXYGEN SATURATION: 98 % | TEMPERATURE: 97.3 F | WEIGHT: 219 LBS | DIASTOLIC BLOOD PRESSURE: 85 MMHG

## 2021-12-29 DIAGNOSIS — U07.1 COVID-19: ICD-10-CM

## 2021-12-29 DIAGNOSIS — Z92.29 PERSONAL HISTORY OF OTHER DRUG THERAPY: ICD-10-CM

## 2021-12-29 PROCEDURE — 99214 OFFICE O/P EST MOD 30 MIN: CPT

## 2021-12-30 PROBLEM — U07.1 INFECTION DUE TO 2019 NOVEL CORONAVIRUS: Status: RESOLVED | Noted: 2020-04-14 | Resolved: 2021-12-30

## 2021-12-30 PROBLEM — Z92.29 HISTORY OF PNEUMOCOCCAL VACCINATION: Status: RESOLVED | Noted: 2020-08-07 | Resolved: 2021-12-30

## 2021-12-30 NOTE — PHYSICAL EXAM
[Well Nourished] : well nourished [Well Developed] : well developed [Normal Sclera/Conjunctiva] : normal sclera/conjunctiva [PERRL] : pupils equal round and reactive to light [Fundoscopic Exam Performed] : fundoscopic ~T exam ~C was performed [Normal Outer Ear/Nose] : the outer ears and nose were normal in appearance [Normal Oropharynx] : the oropharynx was normal [Normal TMs] : both tympanic membranes were normal [No JVD] : no jugular venous distention [Supple] : supple [Thyroid Normal, No Nodules] : the thyroid was normal and there were no nodules present [No Respiratory Distress] : no respiratory distress  [No Accessory Muscle Use] : no accessory muscle use [Clear to Auscultation] : lungs were clear to auscultation bilaterally [Normal Rate] : normal rate  [Regular Rhythm] : with a regular rhythm [Normal S1, S2] : normal S1 and S2 [No Murmur] : no murmur heard [Pedal Pulses Present] : the pedal pulses are present [No Edema] : there was no peripheral edema [No Palpable Aorta] : no palpable aorta [No Extremity Clubbing/Cyanosis] : no extremity clubbing/cyanosis [Normal Appearance] : normal in appearance [Soft] : abdomen soft [Non Tender] : non-tender [Non-distended] : non-distended [No Masses] : no abdominal mass palpated [No HSM] : no HSM [Declined Rectal Exam] : declined rectal exam [Normal Supraclavicular Nodes] : no supraclavicular lymphadenopathy [Normal Posterior Cervical Nodes] : no posterior cervical lymphadenopathy [Normal Anterior Cervical Nodes] : no anterior cervical lymphadenopathy [No CVA Tenderness] : no CVA  tenderness [No Joint Swelling] : no joint swelling [No Focal Deficits] : no focal deficits [Normal Gait] : normal gait [Normal Affect] : the affect was normal [Alert and Oriented x3] : oriented to person, place, and time [Normal Insight/Judgement] : insight and judgment were intact [No Acute Distress] : no acute distress [de-identified] : obese [de-identified] : scar from renal transplant [FreeTextEntry1] : defer to urology [de-identified] : defer to urology [de-identified] : scar secondary to renal transplant [de-identified] : lesion at the lateral aspect of the scar from recent excision of squamous cell carcinoma with bx proven recurrence

## 2021-12-30 NOTE — ASSESSMENT
[High Risk Surgery - Intraperitoneal, Intrathoracic or Supringuinal Vascular Procedures] : High Risk Surgery - Intraperitoneal, Intrathoracic or Supringuinal Vascular Procedures - No (0) [Ischemic Heart Disease] : Ischemic Heart Disease - No (0) [Congestive Heart Failure] : Congestive Heart Failure - No (0) [Prior Cerebrovascular Accident or TIA] : Prior Cerebrovascular Accident or TIA - No (0) [Creatinine >= 2mg/dL (1 Point)] : Creatinine >= 2mg/dL - No (0) [Insulin-dependent Diabetic (1 Point)] : Insulin-dependent Diabetic - No (0) [0] : 0 , RCRI Class: I, Risk of Post-Op Cardiac Complications: 3.9%, 95% CI for Risk Estimate: 2.8% - 5.4% [Patient Optimized for Surgery] : Patient optimized for surgery [As per surgery] : as per surgery [FreeTextEntry4] : Patient is 62 yo with h/o renal transplant, CKD 3, hypertension and hyperlipidemia who is here for excision of recurrence of the squamous cell carcinoma recurrence of the left eyebrow. He is medically optimized for this procedure.  [FreeTextEntry5] : n/a [FreeTextEntry6] : aspirin to be stopped today [FreeTextEntry7] : to take other medications with sip of water am of surgery-cellcept, tacrolimus, famotidine, losartan, flomax and nicocinamide as per usual schedule

## 2021-12-30 NOTE — REVIEW OF SYSTEMS
[Back Pain] : back pain [Negative] : Heme/Lymph [Nocturia] : nocturia [Dysuria] : no dysuria [Incontinence] : no incontinence [Hesitancy] : no hesitancy [Hematuria] : no hematuria [Frequency] : no frequency [Impotence] : no impotency [FreeTextEntry8] : s/p renal transplant on immunosuppressants  [FreeTextEntry9] : mild rare low back stiffness on occasion [de-identified] : scar of the left eyebrow with enlarging lesion at the lateral aspect of the scar- bx proven recurrent squamous carcinoma

## 2021-12-30 NOTE — RESULTS/DATA
[] : results reviewed [de-identified] : normal  [de-identified] : no clinically significant abnormal values [de-identified] : RSR no acute ischemia noted

## 2021-12-30 NOTE — HISTORY OF PRESENT ILLNESS
[No Pertinent Cardiac History] : no history of aortic stenosis, atrial fibrillation, coronary artery disease, recent myocardial infarction, or implantable device/pacemaker [No Pertinent Pulmonary History] : no history of asthma, COPD, sleep apnea, or smoking [No Adverse Anesthesia Reaction] : no adverse anesthesia reaction in self or family member [Chronic Kidney Disease] : chronic kidney disease [(Patient denies any chest pain, claudication, dyspnea on exertion, orthopnea, palpitations or syncope)] : Patient denies any chest pain, claudication, dyspnea on exertion, orthopnea, palpitations or syncope [Chronic Anticoagulation] : no chronic anticoagulation [Diabetes] : no diabetes [FreeTextEntry1] : resection of recurrent squamous cell carcinoma with plastic closure  [FreeTextEntry2] : 1/4/22 [FreeTextEntry3] : Farida Madden/Cathy [FreeTextEntry4] : Patient is 60 yo male with recent resection of squamous cell carcinoma of the area above his left eyebrow. He was recently seen for follow up and found to have recurrence and is now coming for urgent resection of the recurrence.  [FreeTextEntry5] : S/P renal transplant on immunosuppressive therapy

## 2022-01-03 ENCOUNTER — OUTPATIENT (OUTPATIENT)
Dept: OUTPATIENT SERVICES | Facility: HOSPITAL | Age: 62
LOS: 1 days | End: 2022-01-03
Payer: COMMERCIAL

## 2022-01-03 ENCOUNTER — TRANSCRIPTION ENCOUNTER (OUTPATIENT)
Age: 62
End: 2022-01-03

## 2022-01-03 DIAGNOSIS — Z85.828 PERSONAL HISTORY OF OTHER MALIGNANT NEOPLASM OF SKIN: Chronic | ICD-10-CM

## 2022-01-03 DIAGNOSIS — Z20.828 CONTACT WITH AND (SUSPECTED) EXPOSURE TO OTHER VIRAL COMMUNICABLE DISEASES: ICD-10-CM

## 2022-01-03 DIAGNOSIS — Z94.0 KIDNEY TRANSPLANT STATUS: Chronic | ICD-10-CM

## 2022-01-03 DIAGNOSIS — Z98.890 OTHER SPECIFIED POSTPROCEDURAL STATES: Chronic | ICD-10-CM

## 2022-01-03 PROBLEM — U07.1 COVID-19: Chronic | Status: ACTIVE | Noted: 2021-12-28

## 2022-01-03 PROBLEM — E78.5 HYPERLIPIDEMIA, UNSPECIFIED: Chronic | Status: ACTIVE | Noted: 2018-11-15

## 2022-01-03 PROBLEM — C44.92 SQUAMOUS CELL CARCINOMA OF SKIN, UNSPECIFIED: Chronic | Status: ACTIVE | Noted: 2021-12-28

## 2022-01-03 LAB — SARS-COV-2 RNA SPEC QL NAA+PROBE: SIGNIFICANT CHANGE UP

## 2022-01-03 PROCEDURE — 87635 SARS-COV-2 COVID-19 AMP PRB: CPT

## 2022-01-04 ENCOUNTER — OUTPATIENT (OUTPATIENT)
Dept: INPATIENT UNIT | Facility: HOSPITAL | Age: 62
LOS: 1 days | End: 2022-01-04
Payer: COMMERCIAL

## 2022-01-04 ENCOUNTER — RESULT REVIEW (OUTPATIENT)
Age: 62
End: 2022-01-04

## 2022-01-04 ENCOUNTER — APPOINTMENT (OUTPATIENT)
Dept: SURGICAL ONCOLOGY | Facility: HOSPITAL | Age: 62
End: 2022-01-04

## 2022-01-04 VITALS
TEMPERATURE: 98 F | HEART RATE: 74 BPM | SYSTOLIC BLOOD PRESSURE: 120 MMHG | DIASTOLIC BLOOD PRESSURE: 70 MMHG | OXYGEN SATURATION: 99 % | RESPIRATION RATE: 16 BRPM

## 2022-01-04 VITALS
HEIGHT: 71 IN | HEART RATE: 101 BPM | WEIGHT: 216.49 LBS | TEMPERATURE: 98 F | RESPIRATION RATE: 16 BRPM | SYSTOLIC BLOOD PRESSURE: 101 MMHG | DIASTOLIC BLOOD PRESSURE: 69 MMHG | OXYGEN SATURATION: 97 %

## 2022-01-04 DIAGNOSIS — C44.92 SQUAMOUS CELL CARCINOMA OF SKIN, UNSPECIFIED: ICD-10-CM

## 2022-01-04 DIAGNOSIS — Z94.0 KIDNEY TRANSPLANT STATUS: Chronic | ICD-10-CM

## 2022-01-04 DIAGNOSIS — Z85.828 PERSONAL HISTORY OF OTHER MALIGNANT NEOPLASM OF SKIN: Chronic | ICD-10-CM

## 2022-01-04 DIAGNOSIS — Z98.890 OTHER SPECIFIED POSTPROCEDURAL STATES: Chronic | ICD-10-CM

## 2022-01-04 PROCEDURE — 88331 PATH CONSLTJ SURG 1 BLK 1SPC: CPT

## 2022-01-04 PROCEDURE — 87070 CULTURE OTHR SPECIMN AEROBIC: CPT

## 2022-01-04 PROCEDURE — 10180 I&D COMPLEX PO WOUND INFCTJ: CPT

## 2022-01-04 PROCEDURE — ZZZZZ: CPT

## 2022-01-04 PROCEDURE — 88331 PATH CONSLTJ SURG 1 BLK 1SPC: CPT | Mod: 26

## 2022-01-04 PROCEDURE — 21016 RESECT FACE/SCALP TUM 2 CM/>: CPT

## 2022-01-04 PROCEDURE — 11643 EXC F/E/E/N/L MAL+MRG 2.1-3: CPT

## 2022-01-04 PROCEDURE — 88305 TISSUE EXAM BY PATHOLOGIST: CPT

## 2022-01-04 PROCEDURE — 88332 PATH CONSLTJ SURG EA ADD BLK: CPT | Mod: 26

## 2022-01-04 PROCEDURE — 15120 SPLT AGRFT F/S/N/H/F/G/M 1ST: CPT

## 2022-01-04 PROCEDURE — 88332 PATH CONSLTJ SURG EA ADD BLK: CPT

## 2022-01-04 PROCEDURE — 88305 TISSUE EXAM BY PATHOLOGIST: CPT | Mod: 26

## 2022-01-04 RX ORDER — SODIUM CHLORIDE 9 MG/ML
1000 INJECTION, SOLUTION INTRAVENOUS
Refills: 0 | Status: DISCONTINUED | OUTPATIENT
Start: 2022-01-04 | End: 2022-01-04

## 2022-01-04 RX ORDER — ONDANSETRON 8 MG/1
4 TABLET, FILM COATED ORAL ONCE
Refills: 0 | Status: DISCONTINUED | OUTPATIENT
Start: 2022-01-04 | End: 2022-01-04

## 2022-01-04 RX ORDER — OXYCODONE AND ACETAMINOPHEN 5; 325 MG/1; MG/1
1 TABLET ORAL ONCE
Refills: 0 | Status: DISCONTINUED | OUTPATIENT
Start: 2022-01-04 | End: 2022-01-04

## 2022-01-04 RX ORDER — OXYCODONE AND ACETAMINOPHEN 5; 325 MG/1; MG/1
2 TABLET ORAL ONCE
Refills: 0 | Status: DISCONTINUED | OUTPATIENT
Start: 2022-01-04 | End: 2022-01-04

## 2022-01-04 RX ORDER — SODIUM CHLORIDE 9 MG/ML
1000 INJECTION, SOLUTION INTRAVENOUS
Refills: 0 | Status: DISCONTINUED | OUTPATIENT
Start: 2022-01-04 | End: 2022-01-19

## 2022-01-04 RX ORDER — HYDROMORPHONE HYDROCHLORIDE 2 MG/ML
1 INJECTION INTRAMUSCULAR; INTRAVENOUS; SUBCUTANEOUS
Refills: 0 | Status: DISCONTINUED | OUTPATIENT
Start: 2022-01-04 | End: 2022-01-04

## 2022-01-04 RX ORDER — HYDROMORPHONE HYDROCHLORIDE 2 MG/ML
0.5 INJECTION INTRAMUSCULAR; INTRAVENOUS; SUBCUTANEOUS
Refills: 0 | Status: DISCONTINUED | OUTPATIENT
Start: 2022-01-04 | End: 2022-01-04

## 2022-01-04 RX ORDER — TOBRAMYCIN 0.3 %
1 DROPS OPHTHALMIC (EYE)
Qty: 40 | Refills: 0
Start: 2022-01-04 | End: 2022-01-13

## 2022-01-04 RX ORDER — AZTREONAM 2 G
1 VIAL (EA) INJECTION
Qty: 14 | Refills: 0
Start: 2022-01-04 | End: 2022-01-10

## 2022-01-04 RX ADMIN — SODIUM CHLORIDE 50 MILLILITER(S): 9 INJECTION, SOLUTION INTRAVENOUS at 12:55

## 2022-01-04 NOTE — ASU DISCHARGE PLAN (ADULT/PEDIATRIC) - NS MD DC FALL RISK RISK
For information on Fall & Injury Prevention, visit: [email or web address] OR  [email or web address] OR  https://www.cdc.gov/steadi/patient.html

## 2022-01-04 NOTE — ASU DISCHARGE PLAN (ADULT/PEDIATRIC) - ASU DC SPECIAL INSTRUCTIONSFT
This patient may be discharged home from asu when criteria is met   folow up with Dr Morgan tomorrow wed 1/5/22  follow up with Dr Madden in 2 weeks   take extra strength tylenol for discomfort  do not remove , or get wound on left temple wet   do not remove dressing on left thigh   use antibiotic eye drops as prescribed

## 2022-01-04 NOTE — BRIEF OPERATIVE NOTE - NSICDXBRIEFPREOP_GEN_ALL_CORE_FT
PRE-OP DIAGNOSIS:  H/O squamous cell carcinoma excision 04-Jan-2022 16:44:49 with left thigh skin graft Cat Pendleton

## 2022-01-04 NOTE — ASU DISCHARGE PLAN (ADULT/PEDIATRIC) - CARE PROVIDER_API CALL
Rakan Madden)  Surgery  [address] [place] [address]  Phone: [phone]  Fax: [phone]  Follow Up Time:     Daniel Morgan)  Plastic Surgery; Surgery  [address], [address]  [place] [address]  Phone: [phone]  Fax: [phone]  Follow Up Time:

## 2022-01-04 NOTE — BRIEF OPERATIVE NOTE - NSICDXBRIEFPROCEDURE_GEN_ALL_CORE_FT
PROCEDURES:  External destruction of skin lesion of face 04-Jan-2022 16:44:21 excision of left temple squamous cell carcinoma Cta Pendleton

## 2022-01-04 NOTE — ASU DISCHARGE PLAN (ADULT/PEDIATRIC) - DISCHARGE PLAN IS COMPLETE AND GIVEN TO PATIENT
Detail Level: Detailed Quality 110: Preventive Care And Screening: Influenza Immunization: Influenza Immunization Administered during Influenza season Quality 111:Pneumonia Vaccination Status For Older Adults: Pneumococcal Vaccination not Administered or Previously Received, Reason not Otherwise Specified : Yes

## 2022-01-04 NOTE — ASU DISCHARGE PLAN (ADULT/PEDIATRIC) - CALL YOUR DOCTOR IF YOU HAVE ANY OF THE FOLLOWING:
Bleeding that does not stop/Swelling that gets worse/Pain not relieved by Medications Bleeding that does not stop/Swelling that gets worse/Pain not relieved by Medications/Fever greater than (need to indicate Fahrenheit or Celsius)/Nausea and vomiting that does not stop/Inability to tolerate liquids or foods

## 2022-01-06 LAB
CULTURE RESULTS: NO GROWTH — SIGNIFICANT CHANGE UP
SPECIMEN SOURCE: SIGNIFICANT CHANGE UP

## 2022-01-18 LAB — SURGICAL PATHOLOGY STUDY: SIGNIFICANT CHANGE UP

## 2022-01-19 ENCOUNTER — APPOINTMENT (OUTPATIENT)
Dept: SURGICAL ONCOLOGY | Facility: CLINIC | Age: 62
End: 2022-01-19
Payer: COMMERCIAL

## 2022-01-19 VITALS
RESPIRATION RATE: 16 BRPM | SYSTOLIC BLOOD PRESSURE: 116 MMHG | HEART RATE: 86 BPM | DIASTOLIC BLOOD PRESSURE: 78 MMHG | BODY MASS INDEX: 30.66 KG/M2 | WEIGHT: 219 LBS | HEIGHT: 71 IN | TEMPERATURE: 97.6 F

## 2022-01-19 PROCEDURE — 99212 OFFICE O/P EST SF 10 MIN: CPT

## 2022-01-19 NOTE — CONSULT LETTER
[Dear  ___] : Dear  [unfilled], [Courtesy Letter:] : I had the pleasure of seeing your patient, [unfilled], in my office today. [Please see my note below.] : Please see my note below. [Sincerely,] : Sincerely, [FreeTextEntry3] : Rakan Madden MD FACS [DrNae  ___] : Dr. CHAPMAN [DrNae ___] : Dr. CHAPMAN

## 2022-01-19 NOTE — ASSESSMENT
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 Ramirez of radiation oncology to discuss adjuvant radiation treatment options \par Continue follow up with transplant nephrologist regarding medication changes\par RTO 3 months \par \par \par \par Enclosed pathology report\par \par \par

## 2022-01-19 NOTE — ADDENDUM
[FreeTextEntry1] : I, Myra Bob, acted solely as a scribe for Dr. Rakan Madden on this date 01/19/2022.\par

## 2022-01-19 NOTE — HISTORY OF PRESENT ILLNESS
[de-identified] : Pt is a 60 y/o male who presents for a post-op visit. 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 No complaints postop. \par \par Surgical Pathology (1/4/22):\par 1. Skin, left temple, wide excision\par - Squamous cell carcinoma, well to moderately differentiated, 0.7 cm in greatest dimension.\par - No lymphovascular or perineural invasion identified.\par - The tumor is 0.2 cm from the nearest peripheral margin (inferior). The tumor is focally at or very close to the deep margin (obscured by cautery artifact).\par - Biopsy site changes.\par 2. Skin, left temple, deep margin, biopsy- Benign fibrous tissue with biopsy site changes and cautery artifact.\par \par \par He was recently diagnosed with a new squamous cell carcinoma at the edge of his left temple skin graft from a prior resection on biopsy by Dr. Morgan of plastic surgery.\par \par S/p squamous cell carcinoma of left eyebrow excision 8/3/21. \par He was seen by Dr. Espino who performed Mohs surgery  but stopped the procedure due to a persistently positive deep margin.  The pt. has a hx or a renal transplant on immunosuppression and had a prior SCC resected from the left zygomatic region with adjuvant radiation tx.\par \par Pathology (8/13/21): \par Left mid eyebrow ([id number], part1, 1 slide), shave biopsy: \par - Squamous cell carcinoma, invasive, keratoacanthoma type, extending to base of biopsy. \par \par Pathology (8/3/21):\par 1. Skin, left eyebrow, excision \par - Minute residual foci of invasive squamous cell carcinoma, moderately differentiated. \par - Marked ulceration, inflammatory changes and necroinflammatory debris. \par - No lymphovascular invasion or perineural invasion identified. \par - The resection margins are negative for carcinoma. The tumor is 0.2 cm from the nearest margin (lateral). \par 2. Skin, left eyebrow, additional deep margin, biopsy \par - Fibroadipose tissue negative for carcinoma. \par 3. Skin, left eyebrow, additional superior margin, biopsy \par - Skin with focal perineural invasion within the reticular dermis (see comment). \par - No other invasive in situ carcinoma identified. \par \par Pt. was seen by Dr. Sanchez on 10/12/2021.  Given that the patient had prior RT to the left temple, pt. is unable to have RT again. Adjuvant chemo and immunotherapy is not recommended at this time.  Pt. will undergo close observation. \par \par MRI Orbits (7/31/21): Soft tissue defect overlying the left frontal scalp, consistent with history of neoplasm resection. Soft tissue swelling and enhancement of the left periorbital, premaxillary, and left frontal extracalvarial soft tissues, likely postsurgical in nature. Underlying osseous structures and intraorbital contents unremarkable. No evidence for osseous or orbital metastases. \par \par \par Dermatopathology Report (7/14/21):\par Left Mid Eyebrow\par -Squamous cell carcinoma, keratoacanthoma type; transected \par \par PMHx: HTN, SCC, Renal transplant (from wife)- 2004, Allergies to Zithromax

## 2022-01-19 NOTE — PHYSICAL EXAM
[Normal] : supple, no neck mass and thyroid not enlarged [Normal Neck Lymph Nodes] : normal neck lymph nodes  [Normal Supraclavicular Lymph Nodes] : normal supraclavicular lymph nodes [Normal Groin Lymph Nodes] : normal groin lymph nodes [Normal Axillary Lymph Nodes] : normal axillary lymph nodes [Normal] : oriented to person, place and time, with appropriate affect [de-identified] : left temple skin graft incision healing well. , no evidence of infection.

## 2022-02-08 ENCOUNTER — OUTPATIENT (OUTPATIENT)
Dept: OUTPATIENT SERVICES | Facility: HOSPITAL | Age: 62
LOS: 1 days | Discharge: HOME | End: 2022-02-08
Payer: COMMERCIAL

## 2022-02-08 ENCOUNTER — APPOINTMENT (OUTPATIENT)
Dept: RADIATION ONCOLOGY | Facility: CLINIC | Age: 62
End: 2022-02-08
Payer: COMMERCIAL

## 2022-02-08 ENCOUNTER — NON-APPOINTMENT (OUTPATIENT)
Age: 62
End: 2022-02-08

## 2022-02-08 ENCOUNTER — OUTPATIENT (OUTPATIENT)
Dept: OUTPATIENT SERVICES | Facility: HOSPITAL | Age: 62
LOS: 1 days | End: 2022-02-08
Payer: COMMERCIAL

## 2022-02-08 VITALS
TEMPERATURE: 97.52 F | OXYGEN SATURATION: 99 % | RESPIRATION RATE: 17 BRPM | BODY MASS INDEX: 30.72 KG/M2 | DIASTOLIC BLOOD PRESSURE: 82 MMHG | HEART RATE: 92 BPM | SYSTOLIC BLOOD PRESSURE: 120 MMHG | WEIGHT: 220.24 LBS

## 2022-02-08 VITALS
WEIGHT: 218.26 LBS | TEMPERATURE: 98 F | DIASTOLIC BLOOD PRESSURE: 74 MMHG | RESPIRATION RATE: 16 BRPM | OXYGEN SATURATION: 98 % | HEART RATE: 90 BPM | SYSTOLIC BLOOD PRESSURE: 117 MMHG | HEIGHT: 70 IN

## 2022-02-08 DIAGNOSIS — Z85.828 PERSONAL HISTORY OF OTHER MALIGNANT NEOPLASM OF SKIN: Chronic | ICD-10-CM

## 2022-02-08 DIAGNOSIS — H00.016 HORDEOLUM EXTERNUM LEFT EYE, UNSPECIFIED EYELID: ICD-10-CM

## 2022-02-08 DIAGNOSIS — Z94.0 KIDNEY TRANSPLANT STATUS: Chronic | ICD-10-CM

## 2022-02-08 DIAGNOSIS — I10 ESSENTIAL (PRIMARY) HYPERTENSION: ICD-10-CM

## 2022-02-08 DIAGNOSIS — Z98.890 OTHER SPECIFIED POSTPROCEDURAL STATES: Chronic | ICD-10-CM

## 2022-02-08 DIAGNOSIS — Z94.0 KIDNEY TRANSPLANT STATUS: ICD-10-CM

## 2022-02-08 DIAGNOSIS — E78.5 HYPERLIPIDEMIA, UNSPECIFIED: ICD-10-CM

## 2022-02-08 DIAGNOSIS — K21.9 GASTRO-ESOPHAGEAL REFLUX DISEASE WITHOUT ESOPHAGITIS: ICD-10-CM

## 2022-02-08 LAB
ANION GAP SERPL CALC-SCNC: 14 MMOL/L — SIGNIFICANT CHANGE UP (ref 5–17)
BUN SERPL-MCNC: 40 MG/DL — HIGH (ref 7–23)
CALCIUM SERPL-MCNC: 9.5 MG/DL — SIGNIFICANT CHANGE UP (ref 8.4–10.5)
CHLORIDE SERPL-SCNC: 106 MMOL/L — SIGNIFICANT CHANGE UP (ref 96–108)
CO2 SERPL-SCNC: 19 MMOL/L — LOW (ref 22–31)
CREAT SERPL-MCNC: 2.06 MG/DL — HIGH (ref 0.5–1.3)
GLUCOSE SERPL-MCNC: 123 MG/DL — HIGH (ref 70–99)
POTASSIUM SERPL-MCNC: 4.7 MMOL/L — SIGNIFICANT CHANGE UP (ref 3.5–5.3)
POTASSIUM SERPL-SCNC: 4.7 MMOL/L — SIGNIFICANT CHANGE UP (ref 3.5–5.3)
SARS-COV-2 RNA SPEC QL NAA+PROBE: SIGNIFICANT CHANGE UP
SODIUM SERPL-SCNC: 139 MMOL/L — SIGNIFICANT CHANGE UP (ref 135–145)

## 2022-02-08 PROCEDURE — 77334 RADIATION TREATMENT AID(S): CPT | Mod: 26

## 2022-02-08 PROCEDURE — 99244 OFF/OP CNSLTJ NEW/EST MOD 40: CPT | Mod: 25

## 2022-02-08 PROCEDURE — G0463: CPT

## 2022-02-08 PROCEDURE — 80048 BASIC METABOLIC PNL TOTAL CA: CPT

## 2022-02-08 PROCEDURE — 87635 SARS-COV-2 COVID-19 AMP PRB: CPT

## 2022-02-08 PROCEDURE — 77263 THER RADIOLOGY TX PLNG CPLX: CPT

## 2022-02-08 PROCEDURE — 36415 COLL VENOUS BLD VENIPUNCTURE: CPT

## 2022-02-08 RX ORDER — ACETAMINOPHEN 500 MG
2 TABLET ORAL
Qty: 0 | Refills: 0 | DISCHARGE

## 2022-02-08 NOTE — H&P PST ADULT - HISTORY OF PRESENT ILLNESS
60 y/o male with PMH of HTN, HLD, BPH, GERD,  squamous cell carcinoma ( 2018- s/p radiation and excision, 20201- s/p excision )and right kidney transplant presents for PST.  Pt reports  of new onset of squamous cell carcinoma of left temple above eye lid,  and underwent wide excision left temple squamous cell carcinoma with Dr Madden and Dr Morgan on 01/04/2021. Pt reports left eyelid swelling/cyst and presents today for PST for scheduled Excsion of Cyst of Left Eyelid on 2/9/2022 60 y/o male with PMH of HTN, HLD, BPH, GERD,  Squamous cell carcinoma ( 2018- s/p radiation and excision, 20201- s/p excision )and PSHX right kidney transplant presents for PST.  Pt reports  of new onset of squamous cell carcinoma of left temple above eye lid,  and underwent wide excision left temple squamous cell carcinoma with Dr Madden and Dr Morgan on 01/04/2021. Pt reports left eyelid swelling/cyst and presents today for PST for scheduled Excsion of Cyst of Left Eyelid on 2/9/2022

## 2022-02-08 NOTE — H&P PST ADULT - NSANTHOSAYNRD_GEN_A_CORE
neck 17inches/No. KUMAR screening performed.  STOP BANG Legend: 0-2 = LOW Risk; 3-4 = INTERMEDIATE Risk; 5-8 = HIGH Risk

## 2022-02-08 NOTE — H&P PST ADULT - FALL HARM RISK - UNIVERSAL INTERVENTIONS
Bed in lowest position, wheels locked, appropriate side rails in place/Call bell, personal items and telephone in reach/Instruct patient to call for assistance before getting out of bed or chair/Non-slip footwear when patient is out of bed/[place] to call system/Physically safe environment - no spills, clutter or unnecessary equipment/Purposeful Proactive Rounding/Room/bathroom lighting operational, light cord in reach

## 2022-02-08 NOTE — VITALS
[Maximal Pain Intensity: 0/10] : 0/10 [Least Pain Intensity: 0/10] : 0/10 [90: Able to carry normal activity; minor signs or symptoms of disease.] : 90: Able to carry normal activity; minor signs or symptoms of disease.  [ECOG Performance Status: 0 - Fully active, able to carry on all pre-disease performance without restriction] : Performance Status: 0 - Fully active, able to carry on all pre-disease performance without restriction [2 - Distress Level] : Distress Level: 2

## 2022-02-08 NOTE — H&P PST ADULT - PROBLEM SELECTOR PLAN 1
Pre-op instructions given. Pt verbalized understanding  M/C completed 12/2/2021, EKG + labs in chart  Elevated BUN, Creatinine and LOw EGFr - BMP done, pending results   Pending: Covidpcr results, test done today

## 2022-02-08 NOTE — H&P PST ADULT - NSICDXPASTMEDICALHX_GEN_ALL_CORE_FT
PAST MEDICAL HISTORY:  BPH (benign prostatic hyperplasia)     COVID-19 3/2020 - not hospitalized    Eyelid cyst     HLD (hyperlipidemia) no longer on medication    HTN (hypertension)     IFG (impaired fasting glucose)     Nephronophthisis     Squamous cell carcinoma of skin 2018 radiation and surgery, surgery 2021

## 2022-02-09 ENCOUNTER — NON-APPOINTMENT (OUTPATIENT)
Age: 62
End: 2022-02-09

## 2022-02-09 ENCOUNTER — OUTPATIENT (OUTPATIENT)
Dept: OUTPATIENT SERVICES | Facility: HOSPITAL | Age: 62
LOS: 1 days | End: 2022-02-09
Payer: COMMERCIAL

## 2022-02-09 ENCOUNTER — RESULT REVIEW (OUTPATIENT)
Age: 62
End: 2022-02-09

## 2022-02-09 VITALS
DIASTOLIC BLOOD PRESSURE: 83 MMHG | SYSTOLIC BLOOD PRESSURE: 123 MMHG | TEMPERATURE: 98 F | RESPIRATION RATE: 14 BRPM | HEART RATE: 86 BPM | OXYGEN SATURATION: 100 % | WEIGHT: 218.26 LBS | HEIGHT: 70 IN

## 2022-02-09 VITALS
SYSTOLIC BLOOD PRESSURE: 125 MMHG | RESPIRATION RATE: 16 BRPM | TEMPERATURE: 97 F | OXYGEN SATURATION: 100 % | DIASTOLIC BLOOD PRESSURE: 73 MMHG | HEART RATE: 76 BPM

## 2022-02-09 DIAGNOSIS — Z94.0 KIDNEY TRANSPLANT STATUS: Chronic | ICD-10-CM

## 2022-02-09 DIAGNOSIS — Z98.890 OTHER SPECIFIED POSTPROCEDURAL STATES: Chronic | ICD-10-CM

## 2022-02-09 DIAGNOSIS — Z85.828 PERSONAL HISTORY OF OTHER MALIGNANT NEOPLASM OF SKIN: Chronic | ICD-10-CM

## 2022-02-09 DIAGNOSIS — H00.016 HORDEOLUM EXTERNUM LEFT EYE, UNSPECIFIED EYELID: ICD-10-CM

## 2022-02-09 PROCEDURE — 88305 TISSUE EXAM BY PATHOLOGIST: CPT

## 2022-02-09 PROCEDURE — 88305 TISSUE EXAM BY PATHOLOGIST: CPT | Mod: 26

## 2022-02-09 PROCEDURE — 14060 TIS TRNFR E/N/E/L 10 SQ CM/<: CPT

## 2022-02-09 RX ORDER — SODIUM CHLORIDE 9 MG/ML
1000 INJECTION, SOLUTION INTRAVENOUS
Refills: 0 | Status: DISCONTINUED | OUTPATIENT
Start: 2022-02-09 | End: 2022-02-09

## 2022-02-09 RX ORDER — MULTIVIT-MIN/FERROUS GLUCONATE 9 MG/15 ML
0 LIQUID (ML) ORAL
Qty: 0 | Refills: 0 | DISCHARGE

## 2022-02-09 RX ORDER — HYDROMORPHONE HYDROCHLORIDE 2 MG/ML
0.5 INJECTION INTRAMUSCULAR; INTRAVENOUS; SUBCUTANEOUS
Refills: 0 | Status: DISCONTINUED | OUTPATIENT
Start: 2022-02-09 | End: 2022-02-09

## 2022-02-09 RX ORDER — AZTREONAM 2 G
1 VIAL (EA) INJECTION
Qty: 10 | Refills: 0
Start: 2022-02-09 | End: 2022-02-13

## 2022-02-09 RX ORDER — ONDANSETRON 8 MG/1
4 TABLET, FILM COATED ORAL ONCE
Refills: 0 | Status: DISCONTINUED | OUTPATIENT
Start: 2022-02-09 | End: 2022-02-09

## 2022-02-09 RX ADMIN — SODIUM CHLORIDE 50 MILLILITER(S): 9 INJECTION, SOLUTION INTRAVENOUS at 08:27

## 2022-02-09 NOTE — ASU DISCHARGE PLAN (ADULT/PEDIATRIC) - PATIENT EDUCATION MATERIALS PROVIED
Provider pre-printed instructions given Bactrim Ds/Provider pre-printed instructions given/Pre-printed instructions given for other (specify)

## 2022-02-09 NOTE — ASU PATIENT PROFILE, ADULT - VISION (WITH CORRECTIVE LENSES IF THE PATIENT USUALLY WEARS THEM):
wears glasses/Partially impaired: cannot see medication labels or newsprint, but can see obstacles in path, and the surrounding layout; can count fingers at arm's length wears reading and distance glasses/Normal vision: sees adequately in most situations; can see medication labels, newsprint

## 2022-02-09 NOTE — ASU DISCHARGE PLAN (ADULT/PEDIATRIC) - CARE PROVIDER_API CALL
Daniel Morgan)  Plastic Surgery; Surgery  [address], [address]  [place] [address]  Phone: [phone]  Fax: [phone]  Follow Up Time: 1 week

## 2022-02-09 NOTE — ASU PATIENT PROFILE, ADULT - FALL HARM RISK - UNIVERSAL INTERVENTIONS
Bed in lowest position, wheels locked, appropriate side rails in place/Call bell, personal items and telephone in reach/Instruct patient to call for assistance before getting out of bed or chair/Non-slip footwear when patient is out of bed/[place] to call system/Physically safe environment - no spills, clutter or unnecessary equipment/Purposeful Proactive Rounding/Room/bathroom lighting operational, light cord in reach

## 2022-02-09 NOTE — ASU DISCHARGE PLAN (ADULT/PEDIATRIC) - NS MD DC FALL RISK RISK
For information on Fall & Injury Prevention, visit: [email or web address] OR  [email or web address] OR  https://www.cdc.gov/steadi/patient.html

## 2022-02-10 PROBLEM — H02.829 CYSTS OF UNSPECIFIED EYE, UNSPECIFIED EYELID: Chronic | Status: ACTIVE | Noted: 2022-02-08

## 2022-02-14 ENCOUNTER — OUTPATIENT (OUTPATIENT)
Dept: OUTPATIENT SERVICES | Facility: HOSPITAL | Age: 62
LOS: 1 days | Discharge: HOME | End: 2022-02-14

## 2022-02-14 DIAGNOSIS — C44.329 SQUAMOUS CELL CARCINOMA OF SKIN OF OTHER PARTS OF FACE: ICD-10-CM

## 2022-02-14 DIAGNOSIS — Z98.890 OTHER SPECIFIED POSTPROCEDURAL STATES: Chronic | ICD-10-CM

## 2022-02-14 DIAGNOSIS — Z94.0 KIDNEY TRANSPLANT STATUS: Chronic | ICD-10-CM

## 2022-02-14 DIAGNOSIS — Z85.828 PERSONAL HISTORY OF OTHER MALIGNANT NEOPLASM OF SKIN: Chronic | ICD-10-CM

## 2022-02-14 LAB — SURGICAL PATHOLOGY STUDY: SIGNIFICANT CHANGE UP

## 2022-02-14 NOTE — HISTORY OF PRESENT ILLNESS
[FreeTextEntry1] : 61 yr old man diagnosed with squamous cell carcinoma of the left temple. S/p wide excision on 1/4/2022. Referred by Dr. Madden.\par \par Surgical Path: (1/4/2022)  \par 1. Skin, left temple, wide excision\par - Squamous cell carcinoma, well to moderately differentiated, 0.7 cm in greatest dimension.\par - No lymphovascular or perineural invasion identified.\par - The tumor is 0.2 cm from the nearest peripheral margin (inferior).\par The tumor is focally at or very close to the deep margin (obscured by cautery artifact).\par - Biopsy site changes.\par 2. Skin, left temple, deep margin, biopsy\par - Benign fibrous tissue with biopsy site changes and cautery artifact.\par \par Prior Oncological History: \par He was seen by Dr. Espino who performed Mohs surgery but stopped the procedure due to a persistently positive deep margin on 7/14/2021.\par   \par 8/3/2021 S/p Skin, left eyebrow excision with Dr. Morgan of plastic surgery. \par \par Now diagnosed with a new squamous cell carcinoma at the edge of his left temple skin graft from  prior resection by Dr. Morgan of plastic surgery on 8/3/2021. \par \par Patient has a hx or a renal transplant on immunosuppression and had a prior SCC resected from the left zygomatic region with adjuvant radiation tx. 2/18/2019. \par \par 1/24/2022 MRI Orbit Face Neck: Impression:  Enhancing soft tissue, nonspecific however given the clinical history compatible with squamous cell carcinoma, within the left temporal subcutaneous fat primarily lateral to the left orbit with a suggestion of mild extension anteriorly into the subcutaneous fat over the mid/inferior left orbit; no visualized deep extension. \par \par He presents today in consultation to discuss treatment with radiation therapy. \par \par \par \par \par \par \par

## 2022-02-17 ENCOUNTER — APPOINTMENT (OUTPATIENT)
Dept: HEMATOLOGY ONCOLOGY | Facility: CLINIC | Age: 62
End: 2022-02-17

## 2022-03-10 ENCOUNTER — APPOINTMENT (OUTPATIENT)
Dept: HEMATOLOGY ONCOLOGY | Facility: CLINIC | Age: 62
End: 2022-03-10
Payer: COMMERCIAL

## 2022-03-10 VITALS
BODY MASS INDEX: 30.71 KG/M2 | TEMPERATURE: 97.9 F | WEIGHT: 219.34 LBS | HEIGHT: 71 IN | HEART RATE: 91 BPM | OXYGEN SATURATION: 99 % | DIASTOLIC BLOOD PRESSURE: 77 MMHG | SYSTOLIC BLOOD PRESSURE: 145 MMHG | RESPIRATION RATE: 17 BRPM

## 2022-03-10 PROCEDURE — 99214 OFFICE O/P EST MOD 30 MIN: CPT

## 2022-03-11 NOTE — HISTORY OF PRESENT ILLNESS
[de-identified] : Mr. Cruz is a 61 year old gentlemen with past medical history of CKD s/p renal transplant on immunosuppression presenting to the office for an initial consultation of cSCC.\par The graft was from wife and placed 2004.\par \par Patient underwent Mohs surgery 7/28/21, and due to close margin so had wide excision on 8/3/2021.\par \par Pathology (7/14/21): \par Left mid eyebrow ([id number], part1, 1 slide), shave biopsy: \par - Squamous cell carcinoma, invasive, keratoacanthoma type, extending to base of biopsy. \par (Confirmed at [facility] on 8/13/21)\par \par Pathology (8/3/21):\par 1. Skin, left eyebrow, excision \par - Minute residual foci of invasive squamous cell carcinoma, moderately differentiated. \par - Marked ulceration, inflammatory changes and necroinflammatory debris. \par - No lymphovascular invasion or perineural invasion identified. \par - The resection margins are negative for carcinoma. The tumor is 0.2 cm from the nearest margin (lateral). \par 2. Skin, left eyebrow, additional deep margin, biopsy \par - Fibroadipose tissue negative for carcinoma. \par 3. Skin, left eyebrow, additional superior margin, biopsy \par - Skin with focal perineural invasion within the reticular dermis (see comment). \par - No other invasive in situ carcinoma identified. \par \par MRI Orbits (7/31/21): Soft tissue defect overlying the left frontal scalp, consistent with history of neoplasm resection. Soft tissue swelling and enhancement of the left periorbital, premaxillary, and left frontal extracalvarial soft tissues, likely postsurgical in nature. Underlying osseous structures and intraorbital contents unremarkable. No evidence for osseous or orbital metastases. \par \par The tumor is 0.2 cm from the nearest margin (lateral) and there is a perineural invasion. Given that the patient had prior radiation to the left temple and unable to readminister radiation due to concern for additive toxicities to prior treatment. During follow-up he was found to have local recurrence left lateral orbit. \par \par 1/4/2022 had wide excision and graft: Final Diagnosis\par 1. Skin, left temple, wide excision\par - Squamous cell carcinoma, well to moderately differentiated, 0.7 cm in greatest dimension.\par - No lymphovascular or perineural invasion identified.\par - The tumor is 0.2 cm from the nearest peripheral margin (inferior). The tumor is focally at or very close to the deep margin (obscured by cautery artifact).\par - Biopsy site changes.\par 2. Skin, left temple, deep margin, biopsy\par - Benign fibrous tissue with biopsy site changes and cautery artifact.\par \par 1/22/2022 - MRI orbit done after surgery to consider more radiation as adjuvant and no clear evidence of disease persistence.\par MRI re-reviewed at [facility] and nerve signal is being considered as possible persistence. \par For past couple of weeks he has noted a mild headache in the region of the left temple.\par It is quickly relieved with Tylenol x 2 tablets. [de-identified] : Surgical Oncology: Rakan Madden\par Plastic Surgeon: Daniel Morgan\par Radiation Oncology: Isiah Ramirez [phone]\par Dermatology: Hussein Krause\par PCP: Kalpana Navarro\par Transplant Nephrology: Romario Padgett\par Radiation Oncology at [facility]: Wilmer Bragg\par

## 2022-03-11 NOTE — PHYSICAL EXAM
[Fully active, able to carry on all pre-disease performance without restriction] : Status 0 - Fully active, able to carry on all pre-disease performance without restriction [Normal] : affect appropriate [de-identified] : patient with wide excision of left eyebrow and temple. No nodes and no nodularity.

## 2022-04-14 ENCOUNTER — OUTPATIENT (OUTPATIENT)
Dept: OUTPATIENT SERVICES | Facility: HOSPITAL | Age: 62
LOS: 1 days | End: 2022-04-14
Payer: COMMERCIAL

## 2022-04-14 ENCOUNTER — APPOINTMENT (OUTPATIENT)
Dept: MRI IMAGING | Facility: CLINIC | Age: 62
End: 2022-04-14
Payer: COMMERCIAL

## 2022-04-14 DIAGNOSIS — Z85.828 PERSONAL HISTORY OF OTHER MALIGNANT NEOPLASM OF SKIN: Chronic | ICD-10-CM

## 2022-04-14 DIAGNOSIS — Z94.0 KIDNEY TRANSPLANT STATUS: Chronic | ICD-10-CM

## 2022-04-14 DIAGNOSIS — Z00.8 ENCOUNTER FOR OTHER GENERAL EXAMINATION: ICD-10-CM

## 2022-04-14 DIAGNOSIS — Z98.890 OTHER SPECIFIED POSTPROCEDURAL STATES: Chronic | ICD-10-CM

## 2022-04-14 DIAGNOSIS — C44.92 SQUAMOUS CELL CARCINOMA OF SKIN, UNSPECIFIED: ICD-10-CM

## 2022-04-14 PROCEDURE — A9585: CPT

## 2022-04-14 PROCEDURE — 70543 MRI ORBT/FAC/NCK W/O &W/DYE: CPT

## 2022-04-14 PROCEDURE — 70543 MRI ORBT/FAC/NCK W/O &W/DYE: CPT | Mod: 26

## 2022-04-18 ENCOUNTER — OUTPATIENT (OUTPATIENT)
Dept: OUTPATIENT SERVICES | Facility: HOSPITAL | Age: 62
LOS: 1 days | Discharge: HOME | End: 2022-04-18

## 2022-04-18 DIAGNOSIS — C44.329 SQUAMOUS CELL CARCINOMA OF SKIN OF OTHER PARTS OF FACE: ICD-10-CM

## 2022-04-18 DIAGNOSIS — Z98.890 OTHER SPECIFIED POSTPROCEDURAL STATES: Chronic | ICD-10-CM

## 2022-04-18 DIAGNOSIS — Z85.828 PERSONAL HISTORY OF OTHER MALIGNANT NEOPLASM OF SKIN: Chronic | ICD-10-CM

## 2022-04-18 DIAGNOSIS — Z94.0 KIDNEY TRANSPLANT STATUS: Chronic | ICD-10-CM

## 2022-04-19 ENCOUNTER — NON-APPOINTMENT (OUTPATIENT)
Age: 62
End: 2022-04-19

## 2022-05-04 ENCOUNTER — LABORATORY RESULT (OUTPATIENT)
Age: 62
End: 2022-05-04

## 2022-05-04 ENCOUNTER — APPOINTMENT (OUTPATIENT)
Dept: SURGICAL ONCOLOGY | Facility: CLINIC | Age: 62
End: 2022-05-04
Payer: COMMERCIAL

## 2022-05-04 VITALS
HEIGHT: 71 IN | OXYGEN SATURATION: 98 % | BODY MASS INDEX: 30.66 KG/M2 | SYSTOLIC BLOOD PRESSURE: 145 MMHG | RESPIRATION RATE: 16 BRPM | TEMPERATURE: 97.8 F | WEIGHT: 219 LBS | DIASTOLIC BLOOD PRESSURE: 84 MMHG | HEART RATE: 90 BPM

## 2022-05-04 PROCEDURE — 99215 OFFICE O/P EST HI 40 MIN: CPT | Mod: 25

## 2022-05-04 PROCEDURE — 10005 FNA BX W/US GDN 1ST LES: CPT

## 2022-05-04 NOTE — PHYSICAL EXAM
[Normal] : supple, no neck mass and thyroid not enlarged [Normal Neck Lymph Nodes] : normal neck lymph nodes  [Normal Supraclavicular Lymph Nodes] : normal supraclavicular lymph nodes [Normal Groin Lymph Nodes] : normal groin lymph nodes [Normal Axillary Lymph Nodes] : normal axillary lymph nodes [Normal] : oriented to person, place and time, with appropriate affect [de-identified] : left temple skin graft incision healing well.  no evidence of infection.

## 2022-05-04 NOTE — ADDENDUM
[FreeTextEntry1] : I, Myra Bob, acted solely as a scribe for Dr. Rakan Madden on this date 05/04/2022.\par \par

## 2022-05-04 NOTE — ASSESSMENT
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

## 2022-05-04 NOTE — HISTORY OF PRESENT ILLNESS
[de-identified] : Patient is a 62 y/o male who presents for a follow up visit. History significant for CKD w/ renal transplant. 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 Tom Sanchez of medical oncology where no further treatment is indicated and MRI orbit was performed on 4/14/22  which showed no increasing soft tissue thickening or new nodularity in the region of the left eyebrow to suggest tumor recurrence.\par \par He follows up with the transplant nephrologist and reports change in his medication. \par \par Surgical Pathology (1/4/22):\par 1. Skin, left temple, wide excision\par - Squamous cell carcinoma, well to moderately differentiated, 0.7 cm in greatest dimension.\par - No lymphovascular or perineural invasion identified.\par - The tumor is 0.2 cm from the nearest peripheral margin (inferior). The tumor is focally at or very close to the deep margin (obscured by cautery artifact).\par - Biopsy site changes.\par 2. Skin, left temple, deep margin, biopsy- Benign fibrous tissue with biopsy site changes and cautery artifact.\par \par \par He was recently diagnosed with a new squamous cell carcinoma at the edge of his left temple skin graft from a prior resection on biopsy by Dr. Morgan of plastic surgery.\par \par S/p squamous cell carcinoma of left eyebrow excision 8/3/21. \par He was seen by Dr. Espino who performed Mohs surgery  but stopped the procedure due to a persistently positive deep margin.  The pt. has a hx or a renal transplant on immunosuppression and had a prior SCC resected from the left zygomatic region with adjuvant radiation tx.\par \par Pathology (8/13/21): \par Left mid eyebrow ([id number], part1, 1 slide), shave biopsy: \par - Squamous cell carcinoma, invasive, keratoacanthoma type, extending to base of biopsy. \par \par Pathology (8/3/21):\par 1. Skin, left eyebrow, excision \par - Minute residual foci of invasive squamous cell carcinoma, moderately differentiated. \par - Marked ulceration, inflammatory changes and necroinflammatory debris. \par - No lymphovascular invasion or perineural invasion identified. \par - The resection margins are negative for carcinoma. The tumor is 0.2 cm from the nearest margin (lateral). \par 2. Skin, left eyebrow, additional deep margin, biopsy \par - Fibroadipose tissue negative for carcinoma. \par 3. Skin, left eyebrow, additional superior margin, biopsy \par - Skin with focal perineural invasion within the reticular dermis (see comment). \par - No other invasive in situ carcinoma identified. \par \par Pt. was seen by Dr. Sanchez on 10/12/2021.  Given that the patient had prior RT to the left temple, pt. is unable to have RT again. Adjuvant chemo and immunotherapy is not recommended at this time.  Pt. will undergo close observation. \par \par MRI Orbits (7/31/21): Soft tissue defect overlying the left frontal scalp, consistent with history of neoplasm resection. Soft tissue swelling and enhancement of the left periorbital, premaxillary, and left frontal extracalvarial soft tissues, likely postsurgical in nature. Underlying osseous structures and intraorbital contents unremarkable. No evidence for osseous or orbital metastases. \par \par \par Dermatopathology Report (7/14/21):\par Left Mid Eyebrow\par -Squamous cell carcinoma, keratoacanthoma type; transected \par \par PMHx: HTN, SCC, Renal transplant (from wife)- 2004, Allergies to Zithromax

## 2022-05-04 NOTE — PROCEDURE
[FreeTextEntry1] : US-guided FNA of left eyebrow subcutaneous nodule performed under sterile conditions using 1% lidocaine with epinephrine.\par Patient had notable neurologic pain during the procedure suggesting the targeted nodule is a post op aroma \par Specimen sent for pathology\par Pt tolerated procedure well\par Sterile dressing applied \par \par

## 2022-05-04 NOTE — CONSULT LETTER
[Dear  ___] : Dear  [unfilled], [Courtesy Letter:] : I had the pleasure of seeing your patient, [unfilled], in my office today. [Please see my note below.] : Please see my note below. [Sincerely,] : Sincerely, [DrNae  ___] : Dr. CHAPMAN [DrNae ___] : Dr. CHAPMAN [FreeTextEntry3] : Rakan Madden MD FACS

## 2022-05-31 ENCOUNTER — OUTPATIENT (OUTPATIENT)
Dept: OUTPATIENT SERVICES | Facility: HOSPITAL | Age: 62
LOS: 1 days | Discharge: HOME | End: 2022-05-31

## 2022-05-31 DIAGNOSIS — Z94.0 KIDNEY TRANSPLANT STATUS: Chronic | ICD-10-CM

## 2022-05-31 DIAGNOSIS — Z85.828 PERSONAL HISTORY OF OTHER MALIGNANT NEOPLASM OF SKIN: Chronic | ICD-10-CM

## 2022-05-31 DIAGNOSIS — Z98.890 OTHER SPECIFIED POSTPROCEDURAL STATES: Chronic | ICD-10-CM

## 2022-05-31 DIAGNOSIS — C44.329 SQUAMOUS CELL CARCINOMA OF SKIN OF OTHER PARTS OF FACE: ICD-10-CM

## 2022-06-01 ENCOUNTER — APPOINTMENT (OUTPATIENT)
Dept: HEMATOLOGY ONCOLOGY | Facility: CLINIC | Age: 62
End: 2022-06-01
Payer: COMMERCIAL

## 2022-06-01 ENCOUNTER — NON-APPOINTMENT (OUTPATIENT)
Age: 62
End: 2022-06-01

## 2022-06-01 VITALS
OXYGEN SATURATION: 97 % | TEMPERATURE: 98.1 F | RESPIRATION RATE: 16 BRPM | HEIGHT: 70.98 IN | SYSTOLIC BLOOD PRESSURE: 123 MMHG | HEART RATE: 72 BPM | DIASTOLIC BLOOD PRESSURE: 77 MMHG

## 2022-06-01 PROCEDURE — 99213 OFFICE O/P EST LOW 20 MIN: CPT

## 2022-06-01 NOTE — HISTORY OF PRESENT ILLNESS
[de-identified] : Mr. Cruz is a 61 year old gentlemen with past medical history of CKD s/p renal transplant on immunosuppression presenting to the office for an initial consultation of cSCC.\par The graft was from wife and placed 2004.\par \par Patient underwent Mohs surgery 7/28/21, and due to close margin so had wide excision on 8/3/2021.\par \par Pathology (7/14/21): \par Left mid eyebrow ([id number], part1, 1 slide), shave biopsy: \par - Squamous cell carcinoma, invasive, keratoacanthoma type, extending to base of biopsy. \par (Confirmed at [facility] on 8/13/21)\par \par Pathology (8/3/21):\par 1. Skin, left eyebrow, excision \par - Minute residual foci of invasive squamous cell carcinoma, moderately differentiated. \par - Marked ulceration, inflammatory changes and necroinflammatory debris. \par - No lymphovascular invasion or perineural invasion identified. \par - The resection margins are negative for carcinoma. The tumor is 0.2 cm from the nearest margin (lateral). \par 2. Skin, left eyebrow, additional deep margin, biopsy \par - Fibroadipose tissue negative for carcinoma. \par 3. Skin, left eyebrow, additional superior margin, biopsy \par - Skin with focal perineural invasion within the reticular dermis (see comment). \par - No other invasive in situ carcinoma identified. \par \par MRI Orbits (7/31/21): Soft tissue defect overlying the left frontal scalp, consistent with history of neoplasm resection. Soft tissue swelling and enhancement of the left periorbital, premaxillary, and left frontal extracalvarial soft tissues, likely postsurgical in nature. Underlying osseous structures and intraorbital contents unremarkable. No evidence for osseous or orbital metastases. \par \par The tumor is 0.2 cm from the nearest margin (lateral) and there is a perineural invasion. Given that the patient had prior radiation to the left temple and unable to readminister radiation due to concern for additive toxicities to prior treatment. During follow-up he was found to have local recurrence left lateral orbit. \par \par 1/4/2022 had wide excision and graft: Final Diagnosis\par 1. Skin, left temple, wide excision\par - Squamous cell carcinoma, well to moderately differentiated, 0.7 cm in greatest dimension.\par - No lymphovascular or perineural invasion identified.\par - The tumor is 0.2 cm from the nearest peripheral margin (inferior). The tumor is focally at or very close to the deep margin (obscured by cautery artifact).\par - Biopsy site changes.\par 2. Skin, left temple, deep margin, biopsy\par - Benign fibrous tissue with biopsy site changes and cautery artifact.\par \par 1/22/2022 - MRI orbit done after surgery to consider more radiation as adjuvant and no clear evidence of disease persistence.\par MRI re-reviewed at [facility] and nerve signal is being considered as possible persistence. \par For past couple of weeks he has noted a mild headache in the region of the left temple.\par It is quickly relieved with Tylenol x 2 tablets.\par \par 6/1/2022\par Patient seen today to review relapse in the left supraorbital region more medially.\par I have discussed with Dr Madden and think that repeat resection is best policy.\par Have asked patient to rediscuss with his radiation doctor to see if post-op radiation could be applied to this more medial lesion and region.\par I will also obtain CT chest and US neck in meantime to confirm no further distant spread. [de-identified] : Surgical Oncology: Rakan Madden\par Plastic Surgeon: Daniel Morgan\par Radiation Oncology: Isiah Ramirez [phone]\par Dermatology: Hussein Krause\par PCP: Kalpana Navarro\par Transplant Nephrology: Romario Padgett\par Radiation Oncology at [facility]: Wilmer Bragg\par

## 2022-06-01 NOTE — PHYSICAL EXAM
[Fully active, able to carry on all pre-disease performance without restriction] : Status 0 - Fully active, able to carry on all pre-disease performance without restriction [Normal] : affect appropriate [de-identified] : patient with wide excision of left eyebrow and temple. subcentimeter nodular lesion noted over medial supraorbital location.

## 2022-06-09 ENCOUNTER — RESULT REVIEW (OUTPATIENT)
Age: 62
End: 2022-06-09

## 2022-06-09 ENCOUNTER — OUTPATIENT (OUTPATIENT)
Dept: OUTPATIENT SERVICES | Facility: HOSPITAL | Age: 62
LOS: 1 days | End: 2022-06-09
Payer: COMMERCIAL

## 2022-06-09 ENCOUNTER — APPOINTMENT (OUTPATIENT)
Dept: CT IMAGING | Facility: HOSPITAL | Age: 62
End: 2022-06-09

## 2022-06-09 ENCOUNTER — APPOINTMENT (OUTPATIENT)
Dept: ULTRASOUND IMAGING | Facility: HOSPITAL | Age: 62
End: 2022-06-09

## 2022-06-09 DIAGNOSIS — Z85.828 PERSONAL HISTORY OF OTHER MALIGNANT NEOPLASM OF SKIN: Chronic | ICD-10-CM

## 2022-06-09 DIAGNOSIS — Z98.890 OTHER SPECIFIED POSTPROCEDURAL STATES: Chronic | ICD-10-CM

## 2022-06-09 DIAGNOSIS — C44.320 SQUAMOUS CELL CARCINOMA OF SKIN OF UNSPECIFIED PARTS OF FACE: ICD-10-CM

## 2022-06-09 DIAGNOSIS — Z94.0 KIDNEY TRANSPLANT STATUS: Chronic | ICD-10-CM

## 2022-06-09 PROCEDURE — 76536 US EXAM OF HEAD AND NECK: CPT | Mod: 26

## 2022-06-09 PROCEDURE — 76536 US EXAM OF HEAD AND NECK: CPT

## 2022-06-09 PROCEDURE — 71250 CT THORAX DX C-: CPT | Mod: 26

## 2022-06-09 PROCEDURE — 71250 CT THORAX DX C-: CPT

## 2022-06-20 ENCOUNTER — NON-APPOINTMENT (OUTPATIENT)
Age: 62
End: 2022-06-20

## 2022-06-26 ENCOUNTER — NON-APPOINTMENT (OUTPATIENT)
Age: 62
End: 2022-06-26

## 2022-06-28 ENCOUNTER — TRANSCRIPTION ENCOUNTER (OUTPATIENT)
Age: 62
End: 2022-06-28

## 2022-06-29 ENCOUNTER — OUTPATIENT (OUTPATIENT)
Dept: OUTPATIENT SERVICES | Facility: HOSPITAL | Age: 62
LOS: 1 days | End: 2022-06-29
Payer: COMMERCIAL

## 2022-06-29 VITALS
HEART RATE: 87 BPM | RESPIRATION RATE: 16 BRPM | DIASTOLIC BLOOD PRESSURE: 84 MMHG | HEIGHT: 71 IN | TEMPERATURE: 97 F | OXYGEN SATURATION: 97 % | SYSTOLIC BLOOD PRESSURE: 144 MMHG | WEIGHT: 219.36 LBS

## 2022-06-29 DIAGNOSIS — Z98.890 OTHER SPECIFIED POSTPROCEDURAL STATES: Chronic | ICD-10-CM

## 2022-06-29 DIAGNOSIS — Z85.828 PERSONAL HISTORY OF OTHER MALIGNANT NEOPLASM OF SKIN: Chronic | ICD-10-CM

## 2022-06-29 DIAGNOSIS — Z94.0 KIDNEY TRANSPLANT STATUS: Chronic | ICD-10-CM

## 2022-06-29 DIAGNOSIS — Z01.818 ENCOUNTER FOR OTHER PREPROCEDURAL EXAMINATION: ICD-10-CM

## 2022-06-29 DIAGNOSIS — C44.320 SQUAMOUS CELL CARCINOMA OF SKIN OF UNSPECIFIED PARTS OF FACE: ICD-10-CM

## 2022-06-29 DIAGNOSIS — C44.92 SQUAMOUS CELL CARCINOMA OF SKIN, UNSPECIFIED: ICD-10-CM

## 2022-06-29 LAB
ANION GAP SERPL CALC-SCNC: 7 MMOL/L — SIGNIFICANT CHANGE UP (ref 5–17)
BUN SERPL-MCNC: 36 MG/DL — HIGH (ref 7–23)
CALCIUM SERPL-MCNC: 9.8 MG/DL — SIGNIFICANT CHANGE UP (ref 8.4–10.5)
CHLORIDE SERPL-SCNC: 108 MMOL/L — SIGNIFICANT CHANGE UP (ref 96–108)
CO2 SERPL-SCNC: 25 MMOL/L — SIGNIFICANT CHANGE UP (ref 22–31)
CREAT SERPL-MCNC: 1.95 MG/DL — HIGH (ref 0.5–1.3)
EGFR: 38 ML/MIN/1.73M2 — LOW
GLUCOSE SERPL-MCNC: 130 MG/DL — HIGH (ref 70–99)
HCT VFR BLD CALC: 39.9 % — SIGNIFICANT CHANGE UP (ref 39–50)
HGB BLD-MCNC: 13.2 G/DL — SIGNIFICANT CHANGE UP (ref 13–17)
MCHC RBC-ENTMCNC: 29.9 PG — SIGNIFICANT CHANGE UP (ref 27–34)
MCHC RBC-ENTMCNC: 33.1 GM/DL — SIGNIFICANT CHANGE UP (ref 32–36)
MCV RBC AUTO: 90.3 FL — SIGNIFICANT CHANGE UP (ref 80–100)
NRBC # BLD: 0 /100 WBCS — SIGNIFICANT CHANGE UP (ref 0–0)
PLATELET # BLD AUTO: 250 K/UL — SIGNIFICANT CHANGE UP (ref 150–400)
POTASSIUM SERPL-MCNC: 5.1 MMOL/L — SIGNIFICANT CHANGE UP (ref 3.5–5.3)
POTASSIUM SERPL-SCNC: 5.1 MMOL/L — SIGNIFICANT CHANGE UP (ref 3.5–5.3)
RBC # BLD: 4.42 M/UL — SIGNIFICANT CHANGE UP (ref 4.2–5.8)
RBC # FLD: 13.7 % — SIGNIFICANT CHANGE UP (ref 10.3–14.5)
SODIUM SERPL-SCNC: 140 MMOL/L — SIGNIFICANT CHANGE UP (ref 135–145)
WBC # BLD: 8.12 K/UL — SIGNIFICANT CHANGE UP (ref 3.8–10.5)
WBC # FLD AUTO: 8.12 K/UL — SIGNIFICANT CHANGE UP (ref 3.8–10.5)

## 2022-06-29 PROCEDURE — 93005 ELECTROCARDIOGRAM TRACING: CPT

## 2022-06-29 PROCEDURE — 80048 BASIC METABOLIC PNL TOTAL CA: CPT

## 2022-06-29 PROCEDURE — 93010 ELECTROCARDIOGRAM REPORT: CPT | Mod: NC

## 2022-06-29 PROCEDURE — G0463: CPT

## 2022-06-29 PROCEDURE — 85027 COMPLETE CBC AUTOMATED: CPT

## 2022-06-29 PROCEDURE — 36415 COLL VENOUS BLD VENIPUNCTURE: CPT

## 2022-06-29 NOTE — H&P PST ADULT - NSICDXPASTSURGICALHX_GEN_ALL_CORE_FT
PAST SURGICAL HISTORY:  H/O kidney transplant right, 2004    H/O Mohs micrographic surgery for skin cancer 2016    H/O squamous cell carcinoma excision 2018, 2021, 2022

## 2022-06-29 NOTE — H&P PST ADULT - NSICDXPASTMEDICALHX_GEN_ALL_CORE_FT
PAST MEDICAL HISTORY:  BPH (benign prostatic hyperplasia)     Chronic GERD     COVID-19 3/2020 - not hospitalized    Diverticulosis     Eyelid cyst     HLD (hyperlipidemia) no longer on medication    HTN (hypertension)     IFG (impaired fasting glucose)     IgA nephropathy     Nephronophthisis     Squamous cell carcinoma of skin 2018 radiation and surgery, surgery 2021

## 2022-06-29 NOTE — H&P PST ADULT - FALL HARM RISK - UNIVERSAL INTERVENTIONS
Bed in lowest position, wheels locked, appropriate side rails in place/Call bell, personal items and telephone in reach/Instruct patient to call for assistance before getting out of bed or chair/Non-slip footwear when patient is out of bed/[place] to call system/Physically safe environment - no spills, clutter or unnecessary equipment/Purposeful Proactive Rounding/Room/bathroom lighting operational, light cord in reach

## 2022-06-29 NOTE — H&P PST ADULT - NSICDXFAMILYHX_GEN_ALL_CORE_FT
FAMILY HISTORY:  Father  Still living? Unknown  FH: colon cancer, Age at diagnosis: Age Unknown    Mother  Still living? Yes, Estimated age: Age Unknown  FH: atrial fibrillation, Age at diagnosis: Age Unknown

## 2022-06-29 NOTE — H&P PST ADULT - HISTORY OF PRESENT ILLNESS
60 y/o male presents to PST. Per patient he noted lesion to left eyebrow area in May 2022 and had biopsy done, which resulted squamous cell carcinoma. Patient stated that he had covid in 03/2020 without breathing difficulty. Patient stated that he is covid vaccinated.

## 2022-06-29 NOTE — H&P PST ADULT - PROBLEM SELECTOR PLAN 1
Patient schedule for excision of squamous cell to left eyebrow. Labs, EKG, Covid testing and medical clearance pending.

## 2022-07-02 NOTE — PRE-OP CHECKLIST - WEIGHT IN LBS
215.3
Benefits, risks, and possible complications of procedure explained to patient/caregiver who verbalized understanding and gave verbal consent.

## 2022-07-05 ENCOUNTER — APPOINTMENT (OUTPATIENT)
Dept: FAMILY MEDICINE | Facility: CLINIC | Age: 62
End: 2022-07-05

## 2022-07-05 ENCOUNTER — APPOINTMENT (OUTPATIENT)
Dept: RADIATION ONCOLOGY | Facility: CLINIC | Age: 62
End: 2022-07-05

## 2022-07-05 VITALS
DIASTOLIC BLOOD PRESSURE: 86 MMHG | HEART RATE: 91 BPM | BODY MASS INDEX: 31.32 KG/M2 | TEMPERATURE: 97.16 F | RESPIRATION RATE: 16 BRPM | SYSTOLIC BLOOD PRESSURE: 132 MMHG | WEIGHT: 224.43 LBS | OXYGEN SATURATION: 99 %

## 2022-07-05 PROCEDURE — 99214 OFFICE O/P EST MOD 30 MIN: CPT

## 2022-07-05 RX ORDER — TOBRAMYCIN 3 MG/ML
0.3 SOLUTION/ DROPS OPHTHALMIC
Qty: 5 | Refills: 0 | Status: COMPLETED | COMMUNITY
Start: 2022-01-04

## 2022-07-05 RX ORDER — SULFAMETHOXAZOLE AND TRIMETHOPRIM 800; 160 MG/1; MG/1
800-160 TABLET ORAL
Qty: 10 | Refills: 0 | Status: COMPLETED | COMMUNITY
Start: 2022-03-04

## 2022-07-05 RX ORDER — OFLOXACIN 3 MG/ML
0.3 SOLUTION/ DROPS OPHTHALMIC
Qty: 5 | Refills: 0 | Status: COMPLETED | COMMUNITY
Start: 2022-03-29

## 2022-07-05 RX ORDER — PREDNISOLONE ACETATE 10 MG/ML
1 SUSPENSION/ DROPS OPHTHALMIC
Qty: 5 | Refills: 0 | Status: COMPLETED | COMMUNITY
Start: 2022-05-27

## 2022-07-05 RX ORDER — TOBRAMYCIN AND DEXAMETHASONE 3; 1 MG/ML; MG/ML
0.3-0.1 SUSPENSION/ DROPS OPHTHALMIC
Qty: 5 | Refills: 0 | Status: COMPLETED | COMMUNITY
Start: 2022-01-20

## 2022-07-05 RX ORDER — NEOMYCIN AND POLYMYXIN B SULFATES AND DEXAMETHASONE 3.5; 10000; 1 MG/G; [IU]/G; MG/G
3.5-10000-0.1 OINTMENT OPHTHALMIC
Qty: 4 | Refills: 0 | Status: COMPLETED | COMMUNITY
Start: 2022-03-15

## 2022-07-05 RX ORDER — SIROLIMUS 1 MG/1
1 TABLET, FILM COATED ORAL
Refills: 0 | Status: DISCONTINUED | COMMUNITY
Start: 2022-06-01 | End: 2022-07-05

## 2022-07-06 NOTE — REVIEW OF SYSTEMS
[Nocturia] : nocturia [Back Pain] : back pain [Negative] : Heme/Lymph [Dysuria] : no dysuria [Incontinence] : no incontinence [Hesitancy] : no hesitancy [Hematuria] : no hematuria [Frequency] : no frequency [Impotence] : no impotency [FreeTextEntry8] : s/p renal transplant on immunosuppressants, BPH sx [FreeTextEntry9] : mild rare low back stiffness on occasion [de-identified] : scar of the left eyebrow with no gross lesion at this time but recurrence seen on scans lateral aspect of the scar- bx proven recurrent squamous carcinoma

## 2022-07-06 NOTE — PHYSICAL EXAM
[No Acute Distress] : no acute distress [Well Nourished] : well nourished [Well Developed] : well developed [Normal Sclera/Conjunctiva] : normal sclera/conjunctiva [PERRL] : pupils equal round and reactive to light [Normal Outer Ear/Nose] : the outer ears and nose were normal in appearance [Normal Oropharynx] : the oropharynx was normal [Normal TMs] : both tympanic membranes were normal [No JVD] : no jugular venous distention [Supple] : supple [Thyroid Normal, No Nodules] : the thyroid was normal and there were no nodules present [No Respiratory Distress] : no respiratory distress  [No Accessory Muscle Use] : no accessory muscle use [Clear to Auscultation] : lungs were clear to auscultation bilaterally [Normal Rate] : normal rate  [Regular Rhythm] : with a regular rhythm [Normal S1, S2] : normal S1 and S2 [No Murmur] : no murmur heard [Pedal Pulses Present] : the pedal pulses are present [No Edema] : there was no peripheral edema [No Extremity Clubbing/Cyanosis] : no extremity clubbing/cyanosis [Normal Appearance] : normal in appearance [Soft] : abdomen soft [Non Tender] : non-tender [Non-distended] : non-distended [No Masses] : no abdominal mass palpated [No HSM] : no HSM [Declined Rectal Exam] : declined rectal exam [Normal Supraclavicular Nodes] : no supraclavicular lymphadenopathy [Normal Posterior Cervical Nodes] : no posterior cervical lymphadenopathy [Normal Anterior Cervical Nodes] : no anterior cervical lymphadenopathy [No CVA Tenderness] : no CVA  tenderness [No Joint Swelling] : no joint swelling [No Focal Deficits] : no focal deficits [Normal Gait] : normal gait [Normal Affect] : the affect was normal [Alert and Oriented x3] : oriented to person, place, and time [Normal Insight/Judgement] : insight and judgment were intact [de-identified] : obese [de-identified] : scar from renal transplant [FreeTextEntry1] : defer to urology [de-identified] : defer to urology [de-identified] : scar secondary to renal transplant [de-identified] : scarring at the lateral aspect of the left eyebrow from recent excision of squamous cell carcinoma with bx proven recurrence, subsequent skin graft

## 2022-07-06 NOTE — RESULTS/DATA
[] : results reviewed [de-identified] : creatinine 1.92- baseline for this patient s/p renal transplant [de-identified] : normal

## 2022-07-06 NOTE — ASSESSMENT
[High Risk Surgery - Intraperitoneal, Intrathoracic or Supringuinal Vascular Procedures] : High Risk Surgery - Intraperitoneal, Intrathoracic or Supringuinal Vascular Procedures - No (0) [Ischemic Heart Disease] : Ischemic Heart Disease - No (0) [Congestive Heart Failure] : Congestive Heart Failure - No (0) [Prior Cerebrovascular Accident or TIA] : Prior Cerebrovascular Accident or TIA - No (0) [Creatinine >= 2mg/dL (1 Point)] : Creatinine >= 2mg/dL - No (0) [Insulin-dependent Diabetic (1 Point)] : Insulin-dependent Diabetic - No (0) [0] : 0 , RCRI Class: I, Risk of Post-Op Cardiac Complications: 3.9%, 95% CI for Risk Estimate: 2.8% - 5.4% [Patient Optimized for Surgery] : Patient optimized for surgery [No Further Testing Recommended] : no further testing recommended [As per surgery] : as per surgery [FreeTextEntry4] : Patient is 62 yo with h/o renal transplant, CKD 3, hypertension and hyperlipidemia who is here for excision of recurrence of the squamous cell carcinoma recurrence of the left eyebrow. He is medically optimized for this procedure.  [FreeTextEntry5] : n/a [FreeTextEntry6] : aspirin stopped  [FreeTextEntry7] : to take other medications with sip of water am of surgery-      tacrolimus, famotidine, losartan, flomax and nicocinamide as per usual schedule

## 2022-07-06 NOTE — HISTORY OF PRESENT ILLNESS
[No Pertinent Cardiac History] : no history of aortic stenosis, atrial fibrillation, coronary artery disease, recent myocardial infarction, or implantable device/pacemaker [Chronic Kidney Disease] : chronic kidney disease [No Pertinent Pulmonary History] : no history of asthma, COPD, sleep apnea, or smoking [FreeTextEntry1] : wide excision and plastic repair for squamous carcinoma recurrence above the eyebrow [FreeTextEntry2] : 7/12/22 [FreeTextEntry3] : [place]athy [FreeTextEntry4] : Patient had resection of squamous cell carcinoma of the area above the left eyebrow and now is found to have a recurrence in the same area. He will require a repeat wide resection with plastic repair. He is at high risk of recurrence secondary to his immunosuppressive therapy for renal transplant. He remains on an adjusted medication regimen from his transplant team and is reconciled in the note today. His baseline creatinine runs in the 1.9 range and has been stable recently.

## 2022-07-07 NOTE — DISEASE MANAGEMENT
[Clinical] : TNM Stage: c [I] : I [FreeTextEntry4] : recurrent squamous cell carcinoma [TTNM] : x [NTNM] : x [MTNM] : 0

## 2022-07-07 NOTE — HISTORY OF PRESENT ILLNESS
[FreeTextEntry1] : This is a 61 year old man with a recurrent left eyebrow squamous cell carcinoma status post resection and reconstruction. \par He has a history of CKD and renal transplants and is on immunosuppression. He had prior radiation to the left temple 60/30. \par Patient underwent Mohs surgery 7/28/21, and due to close margin so had wide excision on 8/3/2021.\par \par Pathology (7/14/21): \par Left mid eyebrow ([id number], part1, 1 slide), shave biopsy: \par - Squamous cell carcinoma, invasive, keratoacanthoma type, extending to base of biopsy. \par (Confirmed at [facility] on 8/13/21)\par \par MRI 7/31 showed PNI. \par \par Pathology (8/3/21):\par 1. Skin, left eyebrow, excision \par - Minute residual foci of invasive squamous cell carcinoma, moderately differentiated. \par - Marked ulceration, inflammatory changes and necroinflammatory debris. \par - No lymphovascular invasion or perineural invasion identified. \par - The resection margins are negative for carcinoma. The tumor is 0.2 cm from the nearest margin (lateral). \par 2. Skin, left eyebrow, additional deep margin, biopsy \par - Fibroadipose tissue negative for carcinoma. \par 3. Skin, left eyebrow, additional superior margin, biopsy \par - Skin with focal perineural invasion within the reticular dermis (see comment). \par - No other invasive in situ carcinoma identified.\par He had a local recurrence at the left orbital area\par \par 1/4/2022 had wide excision and graft: Final Diagnosis\par 1. Skin, left temple, wide excision\par - Squamous cell carcinoma, well to moderately differentiated, 0.7 cm in greatest dimension.\par - No lymphovascular or perineural invasion identified.\par - The tumor is 0.2 cm from the nearest peripheral margin (inferior). The tumor is focally at or very close to the deep margin (obscured by cautery artifact).\par - Biopsy site changes.\par 2. Skin, left temple, deep margin, biopsy\par - Benign fibrous tissue with biopsy site changes and cautery artifact.\par \par 1/22/2022 - MRI orbit done after surgery to consider more radiation as adjuvant and no clear evidence of disease persistence. No re rt given due to previous RT limiting left orbit dose. \par \par He had a relapse at the left supraorbital region medial to the initial tumor and this was resected. Tumor is 0.2 centimeters from the lateral margin with PNI\par 5/4/22 Final Diagnosis SOFT TISSUE, EYEBROW, LEFT, FNA POSITIVE FOR MALIGNANT CELLS. Cytomorphologically consistent with squamous cell carcinoma. (medial to first site)\par \par He had a nonspecific neck CT and a negative neck ultrasound. Chest CT is nonspecific with punctate 2 millimeter nodules\par Surgery is planned for July 12 at [place] after which he will return for adjuvant radiation.\par

## 2022-07-07 NOTE — PHYSICAL EXAM
[Normal] : well developed, well nourished, in no acute distress [de-identified] : palpable 1cm mass left eyebrow medial to initial area

## 2022-07-11 ENCOUNTER — TRANSCRIPTION ENCOUNTER (OUTPATIENT)
Age: 62
End: 2022-07-11

## 2022-07-12 ENCOUNTER — APPOINTMENT (OUTPATIENT)
Dept: SURGICAL ONCOLOGY | Facility: HOSPITAL | Age: 62
End: 2022-07-12

## 2022-07-12 ENCOUNTER — TRANSCRIPTION ENCOUNTER (OUTPATIENT)
Age: 62
End: 2022-07-12

## 2022-07-12 ENCOUNTER — RESULT REVIEW (OUTPATIENT)
Age: 62
End: 2022-07-12

## 2022-07-12 ENCOUNTER — OUTPATIENT (OUTPATIENT)
Dept: OUTPATIENT SERVICES | Facility: HOSPITAL | Age: 62
LOS: 1 days | End: 2022-07-12
Payer: COMMERCIAL

## 2022-07-12 VITALS
DIASTOLIC BLOOD PRESSURE: 81 MMHG | RESPIRATION RATE: 16 BRPM | OXYGEN SATURATION: 98 % | SYSTOLIC BLOOD PRESSURE: 124 MMHG | WEIGHT: 219.36 LBS | HEART RATE: 87 BPM | HEIGHT: 71 IN | TEMPERATURE: 97 F

## 2022-07-12 VITALS
SYSTOLIC BLOOD PRESSURE: 116 MMHG | TEMPERATURE: 97 F | HEART RATE: 90 BPM | DIASTOLIC BLOOD PRESSURE: 73 MMHG | OXYGEN SATURATION: 97 % | RESPIRATION RATE: 16 BRPM

## 2022-07-12 DIAGNOSIS — Z94.0 KIDNEY TRANSPLANT STATUS: Chronic | ICD-10-CM

## 2022-07-12 DIAGNOSIS — C44.329 SQUAMOUS CELL CARCINOMA OF SKIN OF OTHER PARTS OF FACE: ICD-10-CM

## 2022-07-12 DIAGNOSIS — Z85.828 PERSONAL HISTORY OF OTHER MALIGNANT NEOPLASM OF SKIN: Chronic | ICD-10-CM

## 2022-07-12 DIAGNOSIS — Z98.890 OTHER SPECIFIED POSTPROCEDURAL STATES: Chronic | ICD-10-CM

## 2022-07-12 DIAGNOSIS — C44.320 SQUAMOUS CELL CARCINOMA OF SKIN OF UNSPECIFIED PARTS OF FACE: ICD-10-CM

## 2022-07-12 PROCEDURE — 15758 FREE FASCIAL FLAP MICROVASC: CPT | Mod: AS

## 2022-07-12 PROCEDURE — 21016 RESECT FACE/SCALP TUM 2 CM/>: CPT

## 2022-07-12 PROCEDURE — 21016 RESECT FACE/SCALP TUM 2 CM/>: CPT | Mod: AS

## 2022-07-12 PROCEDURE — 88305 TISSUE EXAM BY PATHOLOGIST: CPT

## 2022-07-12 PROCEDURE — 88331 PATH CONSLTJ SURG 1 BLK 1SPC: CPT

## 2022-07-12 PROCEDURE — 88331 PATH CONSLTJ SURG 1 BLK 1SPC: CPT | Mod: 26

## 2022-07-12 PROCEDURE — 88305 TISSUE EXAM BY PATHOLOGIST: CPT | Mod: 26

## 2022-07-12 PROCEDURE — 14060 TIS TRNFR E/N/E/L 10 SQ CM/<: CPT

## 2022-07-12 RX ORDER — CEPHALEXIN 500 MG
1 CAPSULE ORAL
Qty: 14 | Refills: 0
Start: 2022-07-12 | End: 2022-07-18

## 2022-07-12 RX ORDER — MULTIVIT-MIN/FERROUS GLUCONATE 9 MG/15 ML
1 LIQUID (ML) ORAL
Qty: 0 | Refills: 0 | DISCHARGE

## 2022-07-12 RX ORDER — ACETAMINOPHEN 500 MG
2 TABLET ORAL
Qty: 0 | Refills: 0 | DISCHARGE

## 2022-07-12 RX ORDER — ACETAMINOPHEN 500 MG
650 TABLET ORAL EVERY 6 HOURS
Refills: 0 | Status: DISCONTINUED | OUTPATIENT
Start: 2022-07-12 | End: 2022-07-26

## 2022-07-12 RX ORDER — TACROLIMUS 5 MG/1
1 CAPSULE ORAL
Qty: 0 | Refills: 0 | DISCHARGE

## 2022-07-12 RX ORDER — MYCOPHENOLATE MOFETIL 250 MG/1
1 CAPSULE ORAL
Qty: 0 | Refills: 0 | DISCHARGE

## 2022-07-12 RX ORDER — CHOLECALCIFEROL (VITAMIN D3) 125 MCG
1 CAPSULE ORAL
Qty: 0 | Refills: 0 | DISCHARGE

## 2022-07-12 RX ORDER — SODIUM CHLORIDE 9 MG/ML
1000 INJECTION, SOLUTION INTRAVENOUS
Refills: 0 | Status: DISCONTINUED | OUTPATIENT
Start: 2022-07-12 | End: 2022-07-12

## 2022-07-12 RX ORDER — TAMSULOSIN HYDROCHLORIDE 0.4 MG/1
1 CAPSULE ORAL
Qty: 0 | Refills: 0 | DISCHARGE

## 2022-07-12 RX ORDER — HYDROMORPHONE HYDROCHLORIDE 2 MG/ML
0.5 INJECTION INTRAMUSCULAR; INTRAVENOUS; SUBCUTANEOUS
Refills: 0 | Status: DISCONTINUED | OUTPATIENT
Start: 2022-07-12 | End: 2022-07-12

## 2022-07-12 RX ORDER — ASPIRIN/CALCIUM CARB/MAGNESIUM 324 MG
1 TABLET ORAL
Qty: 0 | Refills: 0 | DISCHARGE

## 2022-07-12 RX ORDER — PSYLLIUM SEED (WITH DEXTROSE)
1 POWDER (GRAM) ORAL
Qty: 0 | Refills: 0 | DISCHARGE

## 2022-07-12 RX ORDER — FAMOTIDINE 10 MG/ML
1 INJECTION INTRAVENOUS
Qty: 0 | Refills: 0 | DISCHARGE

## 2022-07-12 RX ORDER — HYDROMORPHONE HYDROCHLORIDE 2 MG/ML
1 INJECTION INTRAMUSCULAR; INTRAVENOUS; SUBCUTANEOUS
Refills: 0 | Status: DISCONTINUED | OUTPATIENT
Start: 2022-07-12 | End: 2022-07-12

## 2022-07-12 RX ORDER — LOSARTAN POTASSIUM 100 MG/1
1 TABLET, FILM COATED ORAL
Qty: 0 | Refills: 0 | DISCHARGE

## 2022-07-12 RX ORDER — ONDANSETRON 8 MG/1
4 TABLET, FILM COATED ORAL ONCE
Refills: 0 | Status: DISCONTINUED | OUTPATIENT
Start: 2022-07-12 | End: 2022-07-12

## 2022-07-12 RX ADMIN — SODIUM CHLORIDE 50 MILLILITER(S): 9 INJECTION, SOLUTION INTRAVENOUS at 10:04

## 2022-07-12 NOTE — ASU DISCHARGE PLAN (ADULT/PEDIATRIC) - NURSING INSTRUCTIONS
All discharge information reviewed with patient including pain, safety, medication and follow up care . Pt acknowledges understanding of discharge instructions.  Call the office for appointment.

## 2022-07-12 NOTE — BRIEF OPERATIVE NOTE - NSICDXBRIEFPOSTOP_GEN_ALL_CORE_FT
POST-OP DIAGNOSIS:  Squamous cell carcinoma of skin of other parts of face 12-Jul-2022 11:29:58  Elvira Alvarez A

## 2022-07-12 NOTE — ASU DISCHARGE PLAN (ADULT/PEDIATRIC) - NS MD DC FALL RISK RISK
For information on Fall & Injury Prevention, visit: [email or web address] OR  [email or web address] OR  https://www.cdc.gov/steadi/patient.html

## 2022-07-12 NOTE — ASU PATIENT PROFILE, ADULT - FALL HARM RISK - UNIVERSAL INTERVENTIONS
Bed in lowest position, wheels locked, appropriate side rails in place/Call bell, personal items and telephone in reach/Instruct patient to call for assistance before getting out of bed or chair/Non-slip footwear when patient is out of bed/[place] to call system/Physically safe environment - no spills, clutter or unnecessary equipment/Purposeful Proactive Rounding/Room/bathroom lighting operational, light cord in reach

## 2022-07-12 NOTE — ASU DISCHARGE PLAN (ADULT/PEDIATRIC) - CALL YOUR DOCTOR IF YOU HAVE ANY OF THE FOLLOWING:
Bleeding that does not stop/Pain not relieved by Medications/Fever greater than (need to indicate Fahrenheit or Celsius)/Wound/Surgical Site with redness, or foul smelling discharge or pus/Nausea and vomiting that does not stop/Increased irritability or sluggishness

## 2022-07-12 NOTE — BRIEF OPERATIVE NOTE - NSICDXBRIEFPREOP_GEN_ALL_CORE_FT
PRE-OP DIAGNOSIS:  Squamous cell carcinoma of skin of other parts of face 12-Jul-2022 11:29:34  Elvira Alvarez A

## 2022-07-12 NOTE — ASU PREOP CHECKLIST - AS TEMP SITE
Problem: Falls - Risk of  Goal: *Absence of Falls  Description  Document Saulo Salazar Fall Risk and appropriate interventions in the flowsheet. Outcome: Resolved/Met  Note:   Fall Risk Interventions:  Mobility Interventions: Patient to call before getting OOB         Medication Interventions: Teach patient to arise slowly                   Problem: Patient Education: Go to Patient Education Activity  Goal: Patient/Family Education  Description  9-25-19: ostomy teachnig initiated at bedside with wife. To continue till discharge. Outcome: Resolved/Met     Problem: Ostomy Care  Goal: *Patient pouching appliance will fit properly and maintain integrity at least three to five days  Description  Infection control procedures (eg, clean dressings, clean gloves, hand washing, precautions to isolate wound from contamination, sterile instruments used for wound debridement) should be implemented. Outcome: Resolved/Met  Goal: *Acceptance of change in body image  Outcome: Resolved/Met     Problem: Patient Education: Go to Patient Education Activity  Goal: Patient/Family Education  Description  9-30-19: patient changed pouch with guidance from writer and emptied pouch too.    Outcome: Resolved/Met tympanic

## 2022-07-12 NOTE — ASU DISCHARGE PLAN (ADULT/PEDIATRIC) - C. MAKE IMPORTANT PERSONAL OR BUSINESS DECISIONS
Patient was signed out to me by the previous provider pending inpatient psychiatric placement.  The patient has been medically cleared for placement.  The patient was monitored during my shift with no acute issues and maintained stable vital signs.  We will continue to monitor.       Agueda Guzmán MD  09/09/21 0155     Statement Selected

## 2022-07-12 NOTE — ASU DISCHARGE PLAN (ADULT/PEDIATRIC) - CARE PROVIDER_API CALL
Rakan Madden)  Surgery  [address] [place] [address]  Phone: [phone]  Fax: [phone]  Follow Up Time: 2 weeks    Daniel Morgan)  Plastic Surgery; Surgery  [address] [place] [address], [facility] [address]  [place] [address]  Phone: [phone]  Fax: [phone]  Follow Up Time: 1 week

## 2022-07-12 NOTE — ASU DISCHARGE PLAN (ADULT/PEDIATRIC) - PROVIDER TOKENS
PROVIDER:[TOKEN:[[id number]],FOLLOWUP:[2 weeks]],PROVIDER:[TOKEN:[[id number]],FOLLOWUP:[1 week]]

## 2022-07-12 NOTE — BRIEF OPERATIVE NOTE - NSICDXBRIEFPROCEDURE_GEN_ALL_CORE_FT
PROCEDURES:  Excision of malignant skin lesion of face, 2.1 cm to 3.0 cm in diameter 12-Jul-2022 11:31:14  Elvira Alvarez A   PROCEDURES:  Excision of malignant skin lesion of face, 2.1 cm to 3.0 cm in diameter 12-Jul-2022 11:31:14  Elvira Alvarez  Reconstruction using free flap 12-Jul-2022 13:24:29  Elvira Alvarez

## 2022-07-13 PROBLEM — N02.8 RECURRENT AND PERSISTENT HEMATURIA WITH OTHER MORPHOLOGIC CHANGES: Chronic | Status: ACTIVE | Noted: 2022-06-29

## 2022-07-13 PROBLEM — K57.90 DIVERTICULOSIS OF INTESTINE, PART UNSPECIFIED, WITHOUT PERFORATION OR ABSCESS WITHOUT BLEEDING: Chronic | Status: ACTIVE | Noted: 2022-06-29

## 2022-07-13 PROBLEM — K21.9 GASTRO-ESOPHAGEAL REFLUX DISEASE WITHOUT ESOPHAGITIS: Chronic | Status: ACTIVE | Noted: 2022-06-29

## 2022-07-19 LAB — SURGICAL PATHOLOGY STUDY: SIGNIFICANT CHANGE UP

## 2022-07-25 ENCOUNTER — NON-APPOINTMENT (OUTPATIENT)
Age: 62
End: 2022-07-25

## 2022-07-25 PROCEDURE — 77334 RADIATION TREATMENT AID(S): CPT | Mod: 26

## 2022-07-27 ENCOUNTER — APPOINTMENT (OUTPATIENT)
Dept: SURGICAL ONCOLOGY | Facility: CLINIC | Age: 62
End: 2022-07-27

## 2022-07-27 VITALS
DIASTOLIC BLOOD PRESSURE: 81 MMHG | OXYGEN SATURATION: 95 % | HEART RATE: 95 BPM | RESPIRATION RATE: 15 BRPM | BODY MASS INDEX: 30.38 KG/M2 | TEMPERATURE: 208.4 F | SYSTOLIC BLOOD PRESSURE: 128 MMHG | WEIGHT: 217 LBS | HEIGHT: 71 IN

## 2022-07-27 PROCEDURE — 99024 POSTOP FOLLOW-UP VISIT: CPT

## 2022-07-27 NOTE — ASSESSMENT
[FreeTextEntry1] : Status post resection left forehead recurrent squamous cell carcinoma- margins negative on final pathology\par Normal postoperative course\par Patient will begin radiation therapy with Dr. Bragg \par RTO 3 months \par \par \par \par \par \par \par Enclosed pathology report\par \par \par \par \par \par \par

## 2022-07-27 NOTE — HISTORY OF PRESENT ILLNESS
[de-identified] : Patient is a 60 y/o male who presents for initial post op visit. History significant for CKD w/ renal transplant. 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 on 7/12/22. No complaints postop. \par \par Surgical Pathology (7/12/22):\par 1. left supraorbital tumor at supraorbital nerve, biopsy - Fibroadipose tissue with minute focus of invasive squamous cell carcinoma. \par 2. left supraorbital mass lateral margin, biopsy - Fibroadipose and nerve tissue with no malignancy identified. \par 3. left supraorbital mass, medial margin, biopsy - Fibroadipose tissue and skeletal muscle with no malignancy identified. \par 4. left supraorbital tumor, excision - Invasive squamous cell carcinoma, well differentiated, 1.0 cm in greatest dimension. \par - Perineural invasion is present.\par  - No lymphovascular invasion identified. \par - The resection margins appear to be negative for carcinoma. The tumor is very close (less than 0.1 mm) to the nearest margin (posterior). \par \par \par He was seen by Dr. Tom Sanchez of medical oncology where no further treatment is indicated and MRI orbit was performed on 4/14/22  which showed no increasing soft tissue thickening or new nodularity in the region of the left eyebrow to suggest tumor recurrence.\par \par He follows up with the transplant nephrologist and reports change in his medication. \par \par Surgical Pathology (1/4/22):\par 1. Skin, left temple, wide excision\par - Squamous cell carcinoma, well to moderately differentiated, 0.7 cm in greatest dimension.\par - No lymphovascular or perineural invasion identified.\par - The tumor is 0.2 cm from the nearest peripheral margin (inferior). The tumor is focally at or very close to the deep margin (obscured by cautery artifact).\par - Biopsy site changes.\par 2. Skin, left temple, deep margin, biopsy- Benign fibrous tissue with biopsy site changes and cautery artifact.\par \par \par He was recently diagnosed with a new squamous cell carcinoma at the edge of his left temple skin graft from a prior resection on biopsy by Dr. Morgan of plastic surgery.\par \par S/p squamous cell carcinoma of left eyebrow excision 8/3/21. \par He was seen by Dr. Espino who performed Mohs surgery  but stopped the procedure due to a persistently positive deep margin.  The pt. has a hx or a renal transplant on immunosuppression and had a prior SCC resected from the left zygomatic region with adjuvant radiation tx.\par \par Pathology (8/13/21): \par Left mid eyebrow ([id number], part1, 1 slide), shave biopsy: \par - Squamous cell carcinoma, invasive, keratoacanthoma type, extending to base of biopsy. \par \par Pathology (8/3/21):\par 1. Skin, left eyebrow, excision \par - Minute residual foci of invasive squamous cell carcinoma, moderately differentiated. \par - Marked ulceration, inflammatory changes and necroinflammatory debris. \par - No lymphovascular invasion or perineural invasion identified. \par - The resection margins are negative for carcinoma. The tumor is 0.2 cm from the nearest margin (lateral). \par 2. Skin, left eyebrow, additional deep margin, biopsy \par - Fibroadipose tissue negative for carcinoma. \par 3. Skin, left eyebrow, additional superior margin, biopsy \par - Skin with focal perineural invasion within the reticular dermis (see comment). \par - No other invasive in situ carcinoma identified. \par \par Pt. was seen by Dr. Sanchez on 10/12/2021.  Given that the patient had prior RT to the left temple, pt. is unable to have RT again. Adjuvant chemo and immunotherapy is not recommended at this time.  Pt. will undergo close observation. \par \par MRI Orbits (7/31/21): Soft tissue defect overlying the left frontal scalp, consistent with history of neoplasm resection. Soft tissue swelling and enhancement of the left periorbital, premaxillary, and left frontal extracalvarial soft tissues, likely postsurgical in nature. Underlying osseous structures and intraorbital contents unremarkable. No evidence for osseous or orbital metastases. \par \par \par Dermatopathology Report (7/14/21):\par Left Mid Eyebrow\par -Squamous cell carcinoma, keratoacanthoma type; transected \par \par PMHx: HTN, SCC, Renal transplant (from wife)- 2004, Allergies to Zithromax

## 2022-07-27 NOTE — PHYSICAL EXAM
[Normal] : supple, no neck mass and thyroid not enlarged [Normal Neck Lymph Nodes] : normal neck lymph nodes  [Normal Supraclavicular Lymph Nodes] : normal supraclavicular lymph nodes [Normal Groin Lymph Nodes] : normal groin lymph nodes [Normal Axillary Lymph Nodes] : normal axillary lymph nodes [Normal] : oriented to person, place and time, with appropriate affect [de-identified] : forehead flap incision healing well. no evidence of active infection.

## 2022-07-27 NOTE — ADDENDUM
[FreeTextEntry1] : I, Myra Bob, acted solely as a scribe for Dr. Rakan Madden on this date 07/27/2022.\par \par \par

## 2022-08-03 PROCEDURE — 77301 RADIOTHERAPY DOSE PLAN IMRT: CPT | Mod: 26

## 2022-08-03 PROCEDURE — 77338 DESIGN MLC DEVICE FOR IMRT: CPT | Mod: 26

## 2022-08-03 PROCEDURE — 77300 RADIATION THERAPY DOSE PLAN: CPT | Mod: 26

## 2022-08-09 PROCEDURE — 77387B: CUSTOM | Mod: 26

## 2022-08-10 ENCOUNTER — NON-APPOINTMENT (OUTPATIENT)
Age: 62
End: 2022-08-10

## 2022-08-10 VITALS
SYSTOLIC BLOOD PRESSURE: 152 MMHG | BODY MASS INDEX: 31.13 KG/M2 | TEMPERATURE: 96.8 F | WEIGHT: 222.33 LBS | HEIGHT: 71 IN | RESPIRATION RATE: 18 BRPM | HEART RATE: 91 BPM | DIASTOLIC BLOOD PRESSURE: 97 MMHG | OXYGEN SATURATION: 100 %

## 2022-08-10 PROCEDURE — 77387B: CUSTOM | Mod: 26

## 2022-08-10 NOTE — DISEASE MANAGEMENT
[Clinical] : TNM Stage: c [TTNM] : x [NTNM] : x [MTNM] : 0 [I] : I [de-identified] : 400 [de-identified] : 4124 [de-identified] : Left periorbital

## 2022-08-10 NOTE — HISTORY OF PRESENT ILLNESS
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 Soft tissue swelling and enhancement of the left periorbital, premaxillary, and left frontal extracalvarial soft tissues, likely postsurgical in nature. Underlying osseous structures and intraorbital contents unremarkable. No evidence for osseous or orbital metastases. 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 Surgical Pathology \par 1. Skin, left temple, wide excision Squamous cell carcinoma, well to moderately differentiated, 0.7 cm in greatest dimension.No lymphovascular or perineural invasion identified.The tumor is 0.2 cm from the nearest peripheral margin (inferior). The tumor is focally at or very close to the deep margin (obscured by cautery artifact).Biopsy site changes.\par 2. Skin, left temple, deep margin, biopsy- Benign fibrous tissue with biopsy site changes and cautery artifact.\par \par He was seen by Dr. Tom Sanchez of medical oncology where no further treatment is indicated.\par \par 4/14/22:  MRI orbit was performed which showed no increasing soft tissue thickening or new nodularity in the region of the left eyebrow to suggest tumor recurrence.\par \par 7/12/22: S/p surgical resection of left eyebrow recurrent squamous cell carcinoma with plastic surgery reconstruction. Surgical Pathology: Left supraorbital tumor at supraorbital nerve, biopsy - Fibroadipose tissue with minute focus of invasive squamous cell carcinoma. \par 2. left supraorbital mass lateral margin, biopsy - Fibroadipose and nerve tissue with no malignancy identified. \par 3. left supraorbital mass, medial margin, biopsy - Fibroadipose tissue and skeletal muscle with no malignancy identified. \par 4. left supraorbital tumor, excision - Invasive squamous cell carcinoma, well differentiated, 1.0 cm in greatest dimension. \par - Perineural invasion is present.  No lymphovascular invasion identified. \par - The resection margins appear to be negative for carcinoma. The tumor is very close (less than 0.1 mm) to the nearest margin (posterior). \par \par 8/10/2022 He presents for OTV.  No issues

## 2022-08-10 NOTE — REASON FOR VISIT
[Routine On-Treatment] : a routine on-treatment visit for [Head and Neck Cancer] : head and neck cancer [Other: ___] : [unfilled]

## 2022-08-11 PROCEDURE — 77387B: CUSTOM | Mod: 26

## 2022-08-12 PROCEDURE — 77387B: CUSTOM | Mod: 26

## 2022-08-15 PROCEDURE — 77427 RADIATION TX MANAGEMENT X5: CPT

## 2022-08-15 PROCEDURE — 77387B: CUSTOM | Mod: 26

## 2022-08-16 PROCEDURE — 77387B: CUSTOM | Mod: 26

## 2022-08-16 PROCEDURE — 77427 RADIATION TX MANAGEMENT X5: CPT

## 2022-08-17 ENCOUNTER — NON-APPOINTMENT (OUTPATIENT)
Age: 62
End: 2022-08-17

## 2022-08-17 VITALS
HEART RATE: 89 BPM | BODY MASS INDEX: 30.66 KG/M2 | WEIGHT: 219.8 LBS | OXYGEN SATURATION: 99 % | RESPIRATION RATE: 17 BRPM | DIASTOLIC BLOOD PRESSURE: 86 MMHG | SYSTOLIC BLOOD PRESSURE: 131 MMHG

## 2022-08-17 PROCEDURE — 77387B: CUSTOM | Mod: 26

## 2022-08-17 NOTE — DISEASE MANAGEMENT
[Clinical] : TNM Stage: c [I] : I [TTNM] : x [NTNM] : x [MTNM] : 0 [de-identified] : 8875 [de-identified] : 1561 [de-identified] : Left periorbital

## 2022-08-17 NOTE — HISTORY OF PRESENT ILLNESS
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 Soft tissue swelling and enhancement of the left periorbital, premaxillary, and left frontal extracalvarial soft tissues, likely postsurgical in nature. Underlying osseous structures and intraorbital contents unremarkable. No evidence for osseous or orbital metastases. 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 Surgical Pathology \par 1. Skin, left temple, wide excision Squamous cell carcinoma, well to moderately differentiated, 0.7 cm in greatest dimension.No lymphovascular or perineural invasion identified.The tumor is 0.2 cm from the nearest peripheral margin (inferior). The tumor is focally at or very close to the deep margin (obscured by cautery artifact).Biopsy site changes.\par 2. Skin, left temple, deep margin, biopsy- Benign fibrous tissue with biopsy site changes and cautery artifact.\par \par He was seen by Dr. Tom Sanchez of medical oncology where no further treatment is indicated.\par \par 4/14/22:  MRI orbit was performed which showed no increasing soft tissue thickening or new nodularity in the region of the left eyebrow to suggest tumor recurrence.\par \par 7/12/22: S/p surgical resection of left eyebrow recurrent squamous cell carcinoma with plastic surgery reconstruction. Surgical Pathology: Left supraorbital tumor at supraorbital nerve, biopsy - Fibroadipose tissue with minute focus of invasive squamous cell carcinoma. \par 2. left supraorbital mass lateral margin, biopsy - Fibroadipose and nerve tissue with no malignancy identified. \par 3. left supraorbital mass, medial margin, biopsy - Fibroadipose tissue and skeletal muscle with no malignancy identified. \par 4. left supraorbital tumor, excision - Invasive squamous cell carcinoma, well differentiated, 1.0 cm in greatest dimension. \par - Perineural invasion is present.  No lymphovascular invasion identified. \par - The resection margins appear to be negative for carcinoma. The tumor is very close (less than 0.1 mm) to the nearest margin (posterior). \par \par 8/10/2022 He presents for OTV.  No issues\par \par 8/17/2022:  Patient is seen for OTV s/p 7/30 fx to L perorbital. Doing well. Denies pain. Skin care reviewed.

## 2022-08-17 NOTE — REVIEW OF SYSTEMS
[Fatigue: Grade 0] : Fatigue: Grade 0 [Blurred Vision: Grade 0] : Blurred Vision: Grade 0 [Skin Hyperpigmentation: Grade 0] : Skin Hyperpigmentation: Grade 0 [Dermatitis Radiation: Grade 0] : Dermatitis Radiation: Grade 0

## 2022-08-18 PROCEDURE — 77387B: CUSTOM | Mod: 26

## 2022-08-19 PROCEDURE — 77387B: CUSTOM | Mod: 26

## 2022-08-20 ENCOUNTER — OUTPATIENT (OUTPATIENT)
Dept: OUTPATIENT SERVICES | Facility: HOSPITAL | Age: 62
LOS: 1 days | Discharge: HOME | End: 2022-08-20

## 2022-08-20 DIAGNOSIS — C44.329 SQUAMOUS CELL CARCINOMA OF SKIN OF OTHER PARTS OF FACE: ICD-10-CM

## 2022-08-20 DIAGNOSIS — Z98.890 OTHER SPECIFIED POSTPROCEDURAL STATES: Chronic | ICD-10-CM

## 2022-08-20 DIAGNOSIS — Z94.0 KIDNEY TRANSPLANT STATUS: Chronic | ICD-10-CM

## 2022-08-20 DIAGNOSIS — Z85.828 PERSONAL HISTORY OF OTHER MALIGNANT NEOPLASM OF SKIN: Chronic | ICD-10-CM

## 2022-08-22 PROCEDURE — 77387B: CUSTOM | Mod: 26

## 2022-08-23 ENCOUNTER — RESULT REVIEW (OUTPATIENT)
Age: 62
End: 2022-08-23

## 2022-08-23 ENCOUNTER — APPOINTMENT (OUTPATIENT)
Dept: HEMATOLOGY ONCOLOGY | Facility: CLINIC | Age: 62
End: 2022-08-23

## 2022-08-23 VITALS
DIASTOLIC BLOOD PRESSURE: 85 MMHG | WEIGHT: 221.56 LBS | RESPIRATION RATE: 17 BRPM | HEART RATE: 98 BPM | OXYGEN SATURATION: 99 % | TEMPERATURE: 97.5 F | BODY MASS INDEX: 30.9 KG/M2 | SYSTOLIC BLOOD PRESSURE: 125 MMHG

## 2022-08-23 LAB
ALBUMIN SERPL ELPH-MCNC: 4.8 G/DL
ALP BLD-CCNC: 67 U/L
ALT SERPL-CCNC: 18 U/L
ANION GAP SERPL CALC-SCNC: 13 MMOL/L
AST SERPL-CCNC: 18 U/L
BASOPHILS # BLD AUTO: 0.05 K/UL — SIGNIFICANT CHANGE UP (ref 0–0.2)
BASOPHILS NFR BLD AUTO: 0.6 % — SIGNIFICANT CHANGE UP (ref 0–2)
BILIRUB SERPL-MCNC: 0.9 MG/DL
BUN SERPL-MCNC: 31 MG/DL
CALCIUM SERPL-MCNC: 9.8 MG/DL
CHLORIDE SERPL-SCNC: 107 MMOL/L
CO2 SERPL-SCNC: 19 MMOL/L
CREAT SERPL-MCNC: 1.98 MG/DL
EGFR: 38 ML/MIN/1.73M2
EOSINOPHIL # BLD AUTO: 0.4 K/UL — SIGNIFICANT CHANGE UP (ref 0–0.5)
EOSINOPHIL NFR BLD AUTO: 4.9 % — SIGNIFICANT CHANGE UP (ref 0–6)
GLUCOSE SERPL-MCNC: 139 MG/DL
HCT VFR BLD CALC: 40.4 % — SIGNIFICANT CHANGE UP (ref 39–50)
HGB BLD-MCNC: 13.7 G/DL — SIGNIFICANT CHANGE UP (ref 13–17)
IMM GRANULOCYTES NFR BLD AUTO: 0.4 % — SIGNIFICANT CHANGE UP (ref 0–1.5)
LYMPHOCYTES # BLD AUTO: 0.9 K/UL — LOW (ref 1–3.3)
LYMPHOCYTES # BLD AUTO: 11 % — LOW (ref 13–44)
MCHC RBC-ENTMCNC: 31.1 PG — SIGNIFICANT CHANGE UP (ref 27–34)
MCHC RBC-ENTMCNC: 33.9 G/DL — SIGNIFICANT CHANGE UP (ref 32–36)
MCV RBC AUTO: 91.6 FL — SIGNIFICANT CHANGE UP (ref 80–100)
MONOCYTES # BLD AUTO: 0.89 K/UL — SIGNIFICANT CHANGE UP (ref 0–0.9)
MONOCYTES NFR BLD AUTO: 10.9 % — SIGNIFICANT CHANGE UP (ref 2–14)
NEUTROPHILS # BLD AUTO: 5.93 K/UL — SIGNIFICANT CHANGE UP (ref 1.8–7.4)
NEUTROPHILS NFR BLD AUTO: 72.2 % — SIGNIFICANT CHANGE UP (ref 43–77)
NRBC # BLD: 0 /100 WBCS — SIGNIFICANT CHANGE UP (ref 0–0)
PLATELET # BLD AUTO: 200 K/UL — SIGNIFICANT CHANGE UP (ref 150–400)
POTASSIUM SERPL-SCNC: 4.9 MMOL/L
PROT SERPL-MCNC: 6.9 G/DL
RBC # BLD: 4.41 M/UL — SIGNIFICANT CHANGE UP (ref 4.2–5.8)
RBC # FLD: 15.2 % — HIGH (ref 10.3–14.5)
SODIUM SERPL-SCNC: 139 MMOL/L
WBC # BLD: 8.2 K/UL — SIGNIFICANT CHANGE UP (ref 3.8–10.5)
WBC # FLD AUTO: 8.2 K/UL — SIGNIFICANT CHANGE UP (ref 3.8–10.5)

## 2022-08-23 PROCEDURE — 77427 RADIATION TX MANAGEMENT X5: CPT

## 2022-08-23 PROCEDURE — 99214 OFFICE O/P EST MOD 30 MIN: CPT

## 2022-08-23 PROCEDURE — 77387B: CUSTOM | Mod: 26

## 2022-08-23 NOTE — PHYSICAL EXAM
[Fully active, able to carry on all pre-disease performance without restriction] : Status 0 - Fully active, able to carry on all pre-disease performance without restriction [Normal] : affect appropriate [de-identified] : patient with wide excision of left eyebrow and temple.

## 2022-08-23 NOTE — HISTORY OF PRESENT ILLNESS
[de-identified] : Mr. Cruz is a 61 year old gentlemen with past medical history of CKD s/p renal transplant on immunosuppression presenting to the office for an initial consultation of cSCC.\par The graft was from wife and placed 2004.\par \par In 2018, had Mohs with Dr Velasquez and radiation with Dr Ramirez. There was a lesion in the left lateral canthus.\par He had radiation at that time.\par \par Had new lesion with supraorbital lesion in. Patient underwent Mohs surgery 7/28/2021, and due to close margin so had wide excision on 8/3/2021.\par \par Pathology (7/14/21): \par Left mid eyebrow ([id number], part1, 1 slide), shave biopsy: \par - Squamous cell carcinoma, invasive, keratoacanthoma type, extending to base of biopsy. \par (Confirmed at [facility] on 8/13/21)\par \par Pathology (8/3/21):\par 1. Skin, left eyebrow, excision \par - Minute residual foci of invasive squamous cell carcinoma, moderately differentiated. \par - Marked ulceration, inflammatory changes and necroinflammatory debris. \par - No lymphovascular invasion or perineural invasion identified. \par - The resection margins are negative for carcinoma. The tumor is 0.2 cm from the nearest margin (lateral). \par 2. Skin, left eyebrow, additional deep margin, biopsy \par - Fibroadipose tissue negative for carcinoma. \par 3. Skin, left eyebrow, additional superior margin, biopsy \par - Skin with focal perineural invasion within the reticular dermis (see comment). \par - No other invasive in situ carcinoma identified. \par \par MRI Orbits (7/31/21): Soft tissue defect overlying the left frontal scalp, consistent with history of neoplasm resection. Soft tissue swelling and enhancement of the left periorbital, premaxillary, and left frontal extracalvarial soft tissues, likely postsurgical in nature. Underlying osseous structures and intraorbital contents unremarkable. No evidence for osseous or orbital metastases. \par \par The tumor is 0.2 cm from the nearest margin (lateral) and there is a perineural invasion. Given that the patient had prior radiation to the left temple and unable to readminister radiation due to concern for additive toxicities to prior treatment. During follow-up he was found to have local recurrence left lateral orbit. \par \par 1/4/2022 had wide excision and graft: Final Diagnosis\par 1. Skin, left temple, wide excision\par - Squamous cell carcinoma, well to moderately differentiated, 0.7 cm in greatest dimension.\par - No lymphovascular or perineural invasion identified.\par - The tumor is 0.2 cm from the nearest peripheral margin (inferior). The tumor is focally at or very close to the deep margin (obscured by cautery artifact).\par - Biopsy site changes.\par 2. Skin, left temple, deep margin, biopsy\par - Benign fibrous tissue with biopsy site changes and cautery artifact.\par \par 1/22/2022 - MRI orbit done after surgery to consider more radiation as adjuvant and no clear evidence of disease persistence.\par MRI re-reviewed at [facility] and nerve signal is being considered as possible persistence. \par For past couple of weeks he has noted a mild headache in the region of the left temple.\par It is quickly relieved with Tylenol x 2 tablets.\par \par 6/1/2022\par Patient seen today to review relapse in the left supraorbital region more medially.\par I have discussed with Dr Madden and think that repeat resection is best policy.\par Have asked patient to rediscuss with his radiation doctor to see if post-op radiation could be applied to this more medial lesion and region.\par I will also obtain CT chest and US neck in meantime to confirm no further distant spread.\par \par 8/23/22\par Patient underwent excision of local relapse of SCC on left eye supraorbital lesion.\par Nerve had to be resected due to involvement, and he has numbness in that region.\par 1. Left supraorbital tumor at supraorbital nerve, biopsy\par - Fibroadipose tissue with minute focus of invasive squamous cell carcinoma.\par 2. Left supraorbital mass lateral margin, biopsy\par - Fibroadipose and nerve tissue with no malignancy identified.\par 3. Left supraorbital mass, medial margin, biopsy\par - Fibroadipose tissue and skeletal muscle with no malignancy identified.\par 4. Left supraorbital tumor, excision\par - Invasive squamous cell carcinoma, well differentiated, 1.0 cm in greatest dimension.\par - Perineural invasion is present.\par - No lymphovascular invasion identified.\par - The resection margins appear to be negative for carcinoma. The tumor is very close (less than 0.1 mm) to the nearest margin (posterior).\par \par Dr Bragg reviewed prior radiation with Dr Ramirez, and was able to map out radiation for 30 fx.\par Patient is on #11/30 today.\par Given that margins were close but negative, NCCN does not recommend addition of chemotherapy or biologic.\par Will obtain Signatera testing for continued surveillance over time.\par Would consider MRI baseline post-treatment ~ 11/1/2022. [de-identified] : Surgical Oncology: Rakan Madden\par Plastic Surgeon: Daniel Morgan\par Radiation Oncology: Isiah Ramirez [phone]\par Dermatology: Hussein Krause\par PCP: Kalpana Navarro\par Transplant Nephrology: Romario Padgett\par Radiation Oncology at [facility]: Wilmer Bragg\par

## 2022-08-24 ENCOUNTER — NON-APPOINTMENT (OUTPATIENT)
Age: 62
End: 2022-08-24

## 2022-08-24 VITALS
RESPIRATION RATE: 17 BRPM | HEART RATE: 91 BPM | WEIGHT: 221.45 LBS | OXYGEN SATURATION: 98 % | DIASTOLIC BLOOD PRESSURE: 86 MMHG | BODY MASS INDEX: 30.89 KG/M2 | SYSTOLIC BLOOD PRESSURE: 134 MMHG

## 2022-08-24 PROCEDURE — 77387B: CUSTOM | Mod: 26

## 2022-08-24 NOTE — HISTORY OF PRESENT ILLNESS
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 Soft tissue swelling and enhancement of the left periorbital, premaxillary, and left frontal extracalvarial soft tissues, likely postsurgical in nature. Underlying osseous structures and intraorbital contents unremarkable. No evidence for osseous or orbital metastases. 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 Surgical Pathology \par 1. Skin, left temple, wide excision Squamous cell carcinoma, well to moderately differentiated, 0.7 cm in greatest dimension.No lymphovascular or perineural invasion identified.The tumor is 0.2 cm from the nearest peripheral margin (inferior). The tumor is focally at or very close to the deep margin (obscured by cautery artifact).Biopsy site changes.\par 2. Skin, left temple, deep margin, biopsy- Benign fibrous tissue with biopsy site changes and cautery artifact.\par \par He was seen by Dr. Tom Sanchez of medical oncology where no further treatment is indicated.\par \par 4/14/22:  MRI orbit was performed which showed no increasing soft tissue thickening or new nodularity in the region of the left eyebrow to suggest tumor recurrence.\par \par 7/12/22: S/p surgical resection of left eyebrow recurrent squamous cell carcinoma with plastic surgery reconstruction. Surgical Pathology: Left supraorbital tumor at supraorbital nerve, biopsy - Fibroadipose tissue with minute focus of invasive squamous cell carcinoma. \par 2. left supraorbital mass lateral margin, biopsy - Fibroadipose and nerve tissue with no malignancy identified. \par 3. left supraorbital mass, medial margin, biopsy - Fibroadipose tissue and skeletal muscle with no malignancy identified. \par 4. left supraorbital tumor, excision - Invasive squamous cell carcinoma, well differentiated, 1.0 cm in greatest dimension. \par - Perineural invasion is present.  No lymphovascular invasion identified. \par - The resection margins appear to be negative for carcinoma. The tumor is very close (less than 0.1 mm) to the nearest margin (posterior). \par \par 8/10/2022 He presents for OTV.  No issues\par \par 8/17/2022:  Patient is seen for OTV s/p 7/30 fx to L perorbital. Doing well. Denies pain. Skin care reviewed.\par \par 8/24/2022 OTV Completed fx 12/30.  Denies pain, skin with mild erythema. Continues to moisturize as instructed.

## 2022-08-24 NOTE — REVIEW OF SYSTEMS
[Fatigue: Grade 0] : Fatigue: Grade 0 [Blurred Vision: Grade 0] : Blurred Vision: Grade 0 [Skin Hyperpigmentation: Grade 0] : Skin Hyperpigmentation: Grade 0 [Dermatitis Radiation: Grade 1 - Faint erythema or dry desquamation] : Dermatitis Radiation: Grade 1 - Faint erythema or dry desquamation

## 2022-08-24 NOTE — DISEASE MANAGEMENT
[Clinical] : TNM Stage: c [I] : I [TTNM] : x [NTNM] : x [MTNM] : 0 [de-identified] : 3243 [de-identified] : 8677 [de-identified] : Left periorbital

## 2022-08-25 PROCEDURE — 77387B: CUSTOM | Mod: 26

## 2022-08-26 PROCEDURE — 77387B: CUSTOM | Mod: 26

## 2022-08-29 PROCEDURE — 77387B: CUSTOM | Mod: 26

## 2022-08-29 NOTE — ASU PATIENT PROFILE, ADULT - NSICDXPASTMEDICALHX_GEN_ALL_CORE_FT
[Regular Periods] : regular periods [Headaches] : no headaches [Visual Symptoms] : no ~T visual symptoms [Polyuria] : no polyuria [Polydipsia] : no polydipsia [Knee Pain] : no knee pain [Hip Pain] : no hip pain [Constipation] : no constipation [Fatigue] : no fatigue [Anorexia] : no anorexia [Abdominal Pain] : no abdominal pain [Nausea] : no nausea [Vomiting] : no vomiting [FreeTextEntry2] : Rosalba is a 15 year 4 month old girl referred by her pediatrician for an initial evaluation of irregular and heavy menses.  \par \par Medical records reviewed consist of laboratory testing done 8/1/2022 showing normal lipid panel, BMP, TSH, FSH/LH/estradiol; prolactin slightly above range. \par \par Rosalba reports that menarche occurred at ~12 years of age.  Menses had initially been regular every 4 weeks and flow was normal.  About 1.5 years ago her menses started becoming irregular in that menses would be more frequent, of longer duration, and heavy.  She was seen by gynecology (Dr. Dangelo) on 7/29/2022 who did not do additional testing or pelvic US and started Rosalba on Junel OCPs on 7/30/2022; she did not give Rosalba a diagnosis.  Rosalba had possible breakthrough bleeding with the first pack of OCPs but has started the second pack 2 days ago.\par \par Rosalba's mother reports that she thinks she was sent today due to elevated prolactin.  She denies noting hirsutism. \par \par  [FreeTextEntry1] : see HPI PAST MEDICAL HISTORY:  BPH (benign prostatic hyperplasia)     COVID-19 3/2020 - not hospitalized    Eyelid cyst     HLD (hyperlipidemia) no longer on medication    HTN (hypertension)     IFG (impaired fasting glucose)     Nephronophthisis     Squamous cell carcinoma of skin 2018 radiation and surgery, surgery 2021

## 2022-08-30 PROCEDURE — 77387B: CUSTOM | Mod: 26

## 2022-08-31 ENCOUNTER — NON-APPOINTMENT (OUTPATIENT)
Age: 62
End: 2022-08-31

## 2022-08-31 ENCOUNTER — RX RENEWAL (OUTPATIENT)
Age: 62
End: 2022-08-31

## 2022-08-31 VITALS
DIASTOLIC BLOOD PRESSURE: 83 MMHG | BODY MASS INDEX: 31.22 KG/M2 | SYSTOLIC BLOOD PRESSURE: 124 MMHG | WEIGHT: 223 LBS | RESPIRATION RATE: 16 BRPM | TEMPERATURE: 97.3 F | OXYGEN SATURATION: 97 % | HEART RATE: 83 BPM | HEIGHT: 71 IN

## 2022-08-31 PROCEDURE — 77387B: CUSTOM | Mod: 26

## 2022-08-31 NOTE — HISTORY OF PRESENT ILLNESS
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 Soft tissue swelling and enhancement of the left periorbital, premaxillary, and left frontal extracalvarial soft tissues, likely postsurgical in nature. Underlying osseous structures and intraorbital contents unremarkable. No evidence for osseous or orbital metastases. 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 Surgical Pathology \par 1. Skin, left temple, wide excision Squamous cell carcinoma, well to moderately differentiated, 0.7 cm in greatest dimension.No lymphovascular or perineural invasion identified.The tumor is 0.2 cm from the nearest peripheral margin (inferior). The tumor is focally at or very close to the deep margin (obscured by cautery artifact).Biopsy site changes.\par 2. Skin, left temple, deep margin, biopsy- Benign fibrous tissue with biopsy site changes and cautery artifact.\par \par He was seen by Dr. Tom Sanchez of medical oncology where no further treatment is indicated.\par \par 4/14/22:  MRI orbit was performed which showed no increasing soft tissue thickening or new nodularity in the region of the left eyebrow to suggest tumor recurrence.\par \par 7/12/22: S/p surgical resection of left eyebrow recurrent squamous cell carcinoma with plastic surgery reconstruction. Surgical Pathology: Left supraorbital tumor at supraorbital nerve, biopsy - Fibroadipose tissue with minute focus of invasive squamous cell carcinoma. \par 2. left supraorbital mass lateral margin, biopsy - Fibroadipose and nerve tissue with no malignancy identified. \par 3. left supraorbital mass, medial margin, biopsy - Fibroadipose tissue and skeletal muscle with no malignancy identified. \par 4. left supraorbital tumor, excision - Invasive squamous cell carcinoma, well differentiated, 1.0 cm in greatest dimension. \par - Perineural invasion is present.  No lymphovascular invasion identified. \par - The resection margins appear to be negative for carcinoma. The tumor is very close (less than 0.1 mm) to the nearest margin (posterior). \par \par 8/10/2022 He presents for OTV.  No issues\par \par 8/17/2022:  Patient is seen for OTV s/p 7/30 fx to L perorbital. Doing well. Denies pain. Skin care reviewed.\par \par 8/24/2022 OTV Completed fx 12/30.  Denies pain, skin with mild erythema. Continues to moisturize as instructed. \par \par 8/31/2022 OTV. Completed fx 17/30 to left periorbital area. Redness noted in left periorbital area.Compliant with Biafine cream.

## 2022-08-31 NOTE — PHYSICAL EXAM
[Normal] : oriented to person, place and time, the affect was normal, the mood was normal and not anxious [de-identified] : Redness on left periorbital area.

## 2022-08-31 NOTE — DISEASE MANAGEMENT
[Clinical] : TNM Stage: c [I] : I [TTNM] : x [NTNM] : x [MTNM] : 0 [de-identified] : 4384 [de-identified] : 3611 [de-identified] : Left periorbital

## 2022-09-01 PROCEDURE — 77387B: CUSTOM | Mod: 26

## 2022-09-02 PROCEDURE — 77387B: CUSTOM | Mod: 26

## 2022-09-06 PROCEDURE — 77427 RADIATION TX MANAGEMENT X5: CPT

## 2022-09-06 PROCEDURE — 77387B: CUSTOM | Mod: 26

## 2022-09-07 ENCOUNTER — NON-APPOINTMENT (OUTPATIENT)
Age: 62
End: 2022-09-07

## 2022-09-07 VITALS
OXYGEN SATURATION: 98 % | WEIGHT: 221.9 LBS | HEART RATE: 88 BPM | SYSTOLIC BLOOD PRESSURE: 127 MMHG | BODY MASS INDEX: 30.95 KG/M2 | RESPIRATION RATE: 16 BRPM | DIASTOLIC BLOOD PRESSURE: 79 MMHG

## 2022-09-07 PROCEDURE — 77387B: CUSTOM | Mod: 26

## 2022-09-07 NOTE — DISEASE MANAGEMENT
[Clinical] : TNM Stage: c [I] : I [TTNM] : x [NTNM] : x [MTNM] : 0 [de-identified] : 1080 [de-identified] : 5808 [de-identified] : Left periorbital

## 2022-09-07 NOTE — HISTORY OF PRESENT ILLNESS
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 Soft tissue swelling and enhancement of the left periorbital, premaxillary, and left frontal extracalvarial soft tissues, likely postsurgical in nature. Underlying osseous structures and intraorbital contents unremarkable. No evidence for osseous or orbital metastases. 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 Surgical Pathology \par 1. Skin, left temple, wide excision Squamous cell carcinoma, well to moderately differentiated, 0.7 cm in greatest dimension.No lymphovascular or perineural invasion identified.The tumor is 0.2 cm from the nearest peripheral margin (inferior). The tumor is focally at or very close to the deep margin (obscured by cautery artifact).Biopsy site changes.\par 2. Skin, left temple, deep margin, biopsy- Benign fibrous tissue with biopsy site changes and cautery artifact.\par \par He was seen by Dr. Tom Sanchez of medical oncology where no further treatment is indicated.\par \par 4/14/22:  MRI orbit was performed which showed no increasing soft tissue thickening or new nodularity in the region of the left eyebrow to suggest tumor recurrence.\par \par 7/12/22: S/p surgical resection of left eyebrow recurrent squamous cell carcinoma with plastic surgery reconstruction. Surgical Pathology: Left supraorbital tumor at supraorbital nerve, biopsy - Fibroadipose tissue with minute focus of invasive squamous cell carcinoma. \par 2. left supraorbital mass lateral margin, biopsy - Fibroadipose and nerve tissue with no malignancy identified. \par 3. left supraorbital mass, medial margin, biopsy - Fibroadipose tissue and skeletal muscle with no malignancy identified. \par 4. left supraorbital tumor, excision - Invasive squamous cell carcinoma, well differentiated, 1.0 cm in greatest dimension. \par - Perineural invasion is present.  No lymphovascular invasion identified. \par - The resection margins appear to be negative for carcinoma. The tumor is very close (less than 0.1 mm) to the nearest margin (posterior). \par \par 8/10/2022 He presents for OTV.  No issues\par \par 8/17/2022:  Patient is seen for OTV s/p 7/30 fx to L perorbital. Doing well. Denies pain. Skin care reviewed.\par \par 8/24/2022 OTV Completed fx 12/30.  Denies pain, skin with mild erythema. Continues to moisturize as instructed. \par \par 8/31/2022 OTV. Completed fx 17/30 to left periorbital area. Redness noted in left periorbital area.Compliant with Biafine cream.\par \par 9/7/2022 OTV. Completed fx 21/30. Doing well, denies pain. Moisturizing as instructed.

## 2022-09-07 NOTE — REVIEW OF SYSTEMS
[Edema Limbs: Grade 0] : Edema Limbs: Grade 0  [Fatigue: Grade 0] : Fatigue: Grade 0 [Localized Edema: Grade 0] : Localized Edema: Grade 0  [Neck Edema: Grade 0] : Neck Edema: Grade 0 [Tinnitus - Grade 0] : Tinnitus - Grade 0 [Blurred Vision: Grade 0] : Blurred Vision: Grade 0 [Mucositis Oral: Grade 0] : Mucositis Oral: Grade 0  [Xerostomia: Grade 0] : Xerostomia: Grade 0 [Oral Pain: Grade 0] : Oral Pain: Grade 0 [Salivary duct inflammation: Grade 0] : Salivary duct inflammation: Grade 0 [Dysgeusia: Grade 0] : Dysgeusia: Grade 0 [Alopecia: Grade 0] : Alopecia: Grade 0 [Pruritus: Grade 0] : Pruritus: Grade 0 [Skin Atrophy: Grade 0] : Skin Atrophy: Grade 0 [Skin Hyperpigmentation: Grade 0] : Skin Hyperpigmentation: Grade 0 [Skin Induration: Grade 0] : Skin Induration: Grade 0 [Dermatitis Radiation: Grade 1 - Faint erythema or dry desquamation] : Dermatitis Radiation: Grade 1 - Faint erythema or dry desquamation

## 2022-09-08 PROCEDURE — 77387B: CUSTOM | Mod: 26

## 2022-09-09 PROCEDURE — 77387B: CUSTOM | Mod: 26

## 2022-09-09 NOTE — H&P PST ADULT - TEACHING/LEARNING EDUCATIONAL LEVEL
postgraduate Admit for [facility] at 4:30am  D/W Dr. Felipe  Routine Orders  Expedited Prenatal Labs  Pitocin for Augmentation  GBS negative as per patient  Marginal Cord Insertion  Epidural as needed for pain  Covid Swabbed

## 2022-09-12 PROCEDURE — 77427 RADIATION TX MANAGEMENT X5: CPT

## 2022-09-12 PROCEDURE — 77387B: CUSTOM | Mod: 26

## 2022-09-13 ENCOUNTER — NON-APPOINTMENT (OUTPATIENT)
Age: 62
End: 2022-09-13

## 2022-09-13 PROCEDURE — 77387B: CUSTOM | Mod: 26

## 2022-09-14 ENCOUNTER — NON-APPOINTMENT (OUTPATIENT)
Age: 62
End: 2022-09-14

## 2022-09-14 VITALS
OXYGEN SATURATION: 98 % | SYSTOLIC BLOOD PRESSURE: 123 MMHG | DIASTOLIC BLOOD PRESSURE: 86 MMHG | TEMPERATURE: 98 F | WEIGHT: 220 LBS | HEART RATE: 94 BPM | BODY MASS INDEX: 30.68 KG/M2 | RESPIRATION RATE: 16 BRPM

## 2022-09-14 PROCEDURE — 77387B: CUSTOM | Mod: 26

## 2022-09-14 NOTE — VITALS
[Maximal Pain Intensity: 2/10] : 2/10 [Least Pain Intensity: 2/10] : 2/10 [Pain Description/Quality: ___] : Pain description/quality: [unfilled] [Pain Location: ___] : Pain Location: [unfilled]

## 2022-09-14 NOTE — HISTORY OF PRESENT ILLNESS
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 Soft tissue swelling and enhancement of the left periorbital, premaxillary, and left frontal extracalvarial soft tissues, likely postsurgical in nature. Underlying osseous structures and intraorbital contents unremarkable. No evidence for osseous or orbital metastases. 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 Surgical Pathology \par 1. Skin, left temple, wide excision Squamous cell carcinoma, well to moderately differentiated, 0.7 cm in greatest dimension.No lymphovascular or perineural invasion identified.The tumor is 0.2 cm from the nearest peripheral margin (inferior). The tumor is focally at or very close to the deep margin (obscured by cautery artifact).Biopsy site changes.\par 2. Skin, left temple, deep margin, biopsy- Benign fibrous tissue with biopsy site changes and cautery artifact.\par \par He was seen by Dr. Tom Sanchez of medical oncology where no further treatment is indicated.\par \par 4/14/22:  MRI orbit was performed which showed no increasing soft tissue thickening or new nodularity in the region of the left eyebrow to suggest tumor recurrence.\par \par 7/12/22: S/p surgical resection of left eyebrow recurrent squamous cell carcinoma with plastic surgery reconstruction. Surgical Pathology: Left supraorbital tumor at supraorbital nerve, biopsy - Fibroadipose tissue with minute focus of invasive squamous cell carcinoma. \par 2. left supraorbital mass lateral margin, biopsy - Fibroadipose and nerve tissue with no malignancy identified. \par 3. left supraorbital mass, medial margin, biopsy - Fibroadipose tissue and skeletal muscle with no malignancy identified. \par 4. left supraorbital tumor, excision - Invasive squamous cell carcinoma, well differentiated, 1.0 cm in greatest dimension. \par - Perineural invasion is present.  No lymphovascular invasion identified. \par - The resection margins appear to be negative for carcinoma. The tumor is very close (less than 0.1 mm) to the nearest margin (posterior). \par \par 8/10/2022 He presents for OTV.  No issues\par \par 8/17/2022:  Patient is seen for OTV s/p 7/30 fx to L perorbital. Doing well. Denies pain. Skin care reviewed.\par \par 8/24/2022 OTV Completed fx 12/30.  Denies pain, skin with mild erythema. Continues to moisturize as instructed. \par \par 8/31/2022 OTV. Completed fx 17/30 to left periorbital area. Redness noted in left periorbital area.Compliant with Biafine cream.\par \par 9/7/2022 OTV. Completed fx 21/30. Doing well, denies pain. Moisturizing as instructed. \par \par 9/14/22-OTV. Tolerating tx   Completed   26/30 Fx. feeling well. Started using Desitin for skin care. Noted with redness around the left eye. Denies vision changes. Followed with Ophthalmologist Dr Sánchez on 9/6/2022. Reports clinical eye exam was normal.

## 2022-09-14 NOTE — DISEASE MANAGEMENT
[TTNM] : x [NTNM] : x [MTNM] : 0 [de-identified] : 8116 [de-identified] : 0013 [de-identified] : Left periorbital

## 2022-09-14 NOTE — REVIEW OF SYSTEMS
[Pruritus: Grade 0] : Pruritus: Grade 0 [Dermatitis Radiation: Grade 2 - Moderate to brisk erythema; patchy moist desquamation, mostly confined to skin folds and creases; moderate edema] : Dermatitis Radiation: Grade 2 - Moderate to brisk erythema; patchy moist desquamation, mostly confined to skin folds and creases; moderate edema

## 2022-09-15 PROCEDURE — 77387B: CUSTOM | Mod: 26

## 2022-09-16 PROCEDURE — 77387B: CUSTOM | Mod: 26

## 2022-09-19 PROCEDURE — 77427 RADIATION TX MANAGEMENT X5: CPT

## 2022-09-19 PROCEDURE — 77387B: CUSTOM | Mod: 26

## 2022-09-20 ENCOUNTER — NON-APPOINTMENT (OUTPATIENT)
Age: 62
End: 2022-09-20

## 2022-09-20 PROCEDURE — 77387B: CUSTOM | Mod: 26

## 2022-09-20 NOTE — REVIEW OF SYSTEMS
[Edema Limbs: Grade 0] : Edema Limbs: Grade 0  [Fatigue: Grade 0] : Fatigue: Grade 0 [Localized Edema: Grade 0] : Localized Edema: Grade 0  [Neck Edema: Grade 0] : Neck Edema: Grade 0 [Tinnitus - Grade 0] : Tinnitus - Grade 0 [Blurred Vision: Grade 0] : Blurred Vision: Grade 0 [Mucositis Oral: Grade 0] : Mucositis Oral: Grade 0  [Xerostomia: Grade 0] : Xerostomia: Grade 0 [Oral Pain: Grade 0] : Oral Pain: Grade 0 [Salivary duct inflammation: Grade 0] : Salivary duct inflammation: Grade 0 [Dysgeusia: Grade 0] : Dysgeusia: Grade 0 [Alopecia: Grade 0] : Alopecia: Grade 0 [Pruritus: Grade 0] : Pruritus: Grade 0 [Skin Atrophy: Grade 0] : Skin Atrophy: Grade 0 [Skin Hyperpigmentation: Grade 0] : Skin Hyperpigmentation: Grade 0 [Skin Induration: Grade 0] : Skin Induration: Grade 0 [Dermatitis Radiation: Grade 2 - Moderate to brisk erythema; patchy moist desquamation, mostly confined to skin folds and creases; moderate edema] : Dermatitis Radiation: Grade 2 - Moderate to brisk erythema; patchy moist desquamation, mostly confined to skin folds and creases; moderate edema

## 2022-09-20 NOTE — HISTORY OF PRESENT ILLNESS
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 Soft tissue swelling and enhancement of the left periorbital, premaxillary, and left frontal extracalvarial soft tissues, likely postsurgical in nature. Underlying osseous structures and intraorbital contents unremarkable. No evidence for osseous or orbital metastases. 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 Surgical Pathology \par 1. Skin, left temple, wide excision Squamous cell carcinoma, well to moderately differentiated, 0.7 cm in greatest dimension.No lymphovascular or perineural invasion identified.The tumor is 0.2 cm from the nearest peripheral margin (inferior). The tumor is focally at or very close to the deep margin (obscured by cautery artifact).Biopsy site changes.\par 2. Skin, left temple, deep margin, biopsy- Benign fibrous tissue with biopsy site changes and cautery artifact.\par \par He was seen by Dr. Tom Sanchez of medical oncology where no further treatment is indicated.\par \par 4/14/22:  MRI orbit was performed which showed no increasing soft tissue thickening or new nodularity in the region of the left eyebrow to suggest tumor recurrence.\par \par 7/12/22: S/p surgical resection of left eyebrow recurrent squamous cell carcinoma with plastic surgery reconstruction. Surgical Pathology: Left supraorbital tumor at supraorbital nerve, biopsy - Fibroadipose tissue with minute focus of invasive squamous cell carcinoma. \par 2. left supraorbital mass lateral margin, biopsy - Fibroadipose and nerve tissue with no malignancy identified. \par 3. left supraorbital mass, medial margin, biopsy - Fibroadipose tissue and skeletal muscle with no malignancy identified. \par 4. left supraorbital tumor, excision - Invasive squamous cell carcinoma, well differentiated, 1.0 cm in greatest dimension. \par - Perineural invasion is present.  No lymphovascular invasion identified. \par - The resection margins appear to be negative for carcinoma. The tumor is very close (less than 0.1 mm) to the nearest margin (posterior). \par \par 8/10/2022 He presents for OTV.  No issues\par \par 8/17/2022:  Patient is seen for OTV s/p 7/30 fx to L perorbital. Doing well. Denies pain. Skin care reviewed.\par \par 8/24/2022 OTV Completed fx 12/30.  Denies pain, skin with mild erythema. Continues to moisturize as instructed. \par \par 8/31/2022 OTV. Completed fx 17/30 to left periorbital area. Redness noted in left periorbital area.Compliant with Biafine cream.\par \par 9/7/2022 OTV. Completed fx 21/30. Doing well, denies pain. Moisturizing as instructed. \par \par 9/14/22-OTV. Tolerating tx   Completed   26/30 Fx. feeling well. Started using Desitin for skin care. Noted with redness around the left eye. Denies vision changes. Followed with Ophthalmologist Dr Sánchez on 9/6/2022. Reports clinical eye exam was normal.\par \par 9/20/22: Completed treatment today. Skin care discussed. Will return in 4 weeks for PTE. No vision changes. Redness around left eye, will continue on desitin

## 2022-09-20 NOTE — VITALS
[Maximal Pain Intensity: 2/10] : 2/10 [Least Pain Intensity: 2/10] : 2/10 [Pain Description/Quality: ___] : Pain description/quality: [unfilled] [Pain Location: ___] : Pain Location: [unfilled] [90: Able to carry normal activity; minor signs or symptoms of disease.] : 90: Able to carry normal activity; minor signs or symptoms of disease.  [ECOG Performance Status: 0 - Fully active, able to carry on all pre-disease performance without restriction] : Performance Status: 0 - Fully active, able to carry on all pre-disease performance without restriction

## 2022-09-20 NOTE — DISEASE MANAGEMENT
[Clinical] : TNM Stage: c [TTNM] : x [NTNM] : x [MTNM] : 0 [I] : I [de-identified] : 2905 [de-identified] : 0541 [de-identified] : Left periorbital

## 2022-10-10 ENCOUNTER — TRANSCRIPTION ENCOUNTER (OUTPATIENT)
Age: 62
End: 2022-10-10

## 2022-10-19 NOTE — REASON FOR VISIT
[Post-Treatment Evaluation] : post-treatment evaluation for [Head and Neck Cancer] : head and neck cancer [Other: ___] : [unfilled]

## 2022-10-20 ENCOUNTER — APPOINTMENT (OUTPATIENT)
Dept: RADIATION ONCOLOGY | Facility: CLINIC | Age: 62
End: 2022-10-20

## 2022-10-20 VITALS
TEMPERATURE: 98.6 F | OXYGEN SATURATION: 97 % | HEIGHT: 71 IN | BODY MASS INDEX: 29.52 KG/M2 | RESPIRATION RATE: 17 BRPM | HEART RATE: 97 BPM | SYSTOLIC BLOOD PRESSURE: 102 MMHG | DIASTOLIC BLOOD PRESSURE: 71 MMHG | WEIGHT: 210.87 LBS

## 2022-10-20 PROCEDURE — 99024 POSTOP FOLLOW-UP VISIT: CPT

## 2022-10-20 RX ORDER — COVID-19 ANTIGEN TEST
KIT MISCELLANEOUS
Qty: 8 | Refills: 0 | Status: COMPLETED | COMMUNITY
Start: 2022-08-21

## 2022-10-20 NOTE — REVIEW OF SYSTEMS
[Fatigue: Grade 1 - Fatigue relieved by rest] : Fatigue: Grade 1 - Fatigue relieved by rest [Blurred Vision: Grade 0] : Blurred Vision: Grade 0 [Xerostomia: Grade 0] : Xerostomia: Grade 0 [Oral Pain: Grade 0] : Oral Pain: Grade 0 [Dysgeusia: Grade 0] : Dysgeusia: Grade 0 [Skin Hyperpigmentation: Grade 0] : Skin Hyperpigmentation: Grade 0 [Dermatitis Radiation: Grade 1 - Faint erythema or dry desquamation] : Dermatitis Radiation: Grade 1 - Faint erythema or dry desquamation

## 2022-10-20 NOTE — HISTORY OF PRESENT ILLNESS
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 Soft tissue swelling and enhancement of the left periorbital, premaxillary, and left frontal extracalvarial soft tissues, likely postsurgical in nature. Underlying osseous structures and intraorbital contents unremarkable. No evidence for osseous or orbital metastases. 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 Surgical Pathology \par 1. Skin, left temple, wide excision Squamous cell carcinoma, well to moderately differentiated, 0.7 cm in greatest dimension.No lymphovascular or perineural invasion identified.The tumor is 0.2 cm from the nearest peripheral margin (inferior). The tumor is focally at or very close to the deep margin (obscured by cautery artifact).Biopsy site changes.\par 2. Skin, left temple, deep margin, biopsy- Benign fibrous tissue with biopsy site changes and cautery artifact.\par \par He was seen by Dr. Tom Sanchez of medical oncology where no further treatment is indicated.\par \par 4/14/22:  MRI orbit was performed which showed no increasing soft tissue thickening or new nodularity in the region of the left eyebrow to suggest tumor recurrence.\par \par 7/12/22: S/p surgical resection of left eyebrow recurrent squamous cell carcinoma with plastic surgery reconstruction. Surgical Pathology: Left supraorbital tumor at supraorbital nerve, biopsy - Fibroadipose tissue with minute focus of invasive squamous cell carcinoma. \par 2. left supraorbital mass lateral margin, biopsy - Fibroadipose and nerve tissue with no malignancy identified. \par 3. left supraorbital mass, medial margin, biopsy - Fibroadipose tissue and skeletal muscle with no malignancy identified. \par 4. left supraorbital tumor, excision - Invasive squamous cell carcinoma, well differentiated, 1.0 cm in greatest dimension. \par - Perineural invasion is present.  No lymphovascular invasion identified. \par - The resection margins appear to be negative for carcinoma. The tumor is very close (less than 0.1 mm) to the nearest margin (posterior). \par \par 9/20/2022 Completed planned RT to left periorbital total dose of 6000 cGy. \par \par Presents today for post treatment evaluation.

## 2022-10-26 ENCOUNTER — APPOINTMENT (OUTPATIENT)
Dept: SURGICAL ONCOLOGY | Facility: CLINIC | Age: 62
End: 2022-10-26

## 2022-10-26 VITALS
HEIGHT: 71 IN | BODY MASS INDEX: 28 KG/M2 | OXYGEN SATURATION: 97 % | HEART RATE: 103 BPM | SYSTOLIC BLOOD PRESSURE: 132 MMHG | RESPIRATION RATE: 17 BRPM | WEIGHT: 200 LBS | DIASTOLIC BLOOD PRESSURE: 81 MMHG

## 2022-10-26 PROCEDURE — 99214 OFFICE O/P EST MOD 30 MIN: CPT

## 2022-10-26 NOTE — PHYSICAL EXAM
[Normal] : supple, no neck mass and thyroid not enlarged [Normal Neck Lymph Nodes] : normal neck lymph nodes  [Normal Supraclavicular Lymph Nodes] : normal supraclavicular lymph nodes [Normal Groin Lymph Nodes] : normal groin lymph nodes [Normal Axillary Lymph Nodes] : normal axillary lymph nodes [Normal] : oriented to person, place and time, with appropriate affect [de-identified] : Left eyebrow no longer present, reconstruction well-healed, no evidence of recurrence, no suspicious skin lesions

## 2022-10-26 NOTE — ASSESSMENT
[FreeTextEntry1] : Status post resection left forehead recurrent squamous cell carcinoma- margins negative on final pathology\par S/p radiation therapy as per Dr. Bragg \par Continue dermatologic surveillance \par RTO 3 months after MRI \par \par \par \par \par \par \par \par \par \par \par \par \par \par

## 2022-10-26 NOTE — ADDENDUM
[FreeTextEntry1] : I, Myra Bob, acted solely as a scribe for Dr. Rakan Madden on this date 10/26/2022.\par \par \par \par

## 2022-10-26 NOTE — HISTORY OF PRESENT ILLNESS
[de-identified] : Patient is a 61 y/o male who presents a follow up visit. History significant for CKD w/ renal transplant. 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 \par He completed radiation therapy in September as per Dr. Bragg and reports he will be undergoing a MRI upcoming December. \par \par Surgical Pathology (7/12/22):\par 1. left supraorbital tumor at supraorbital nerve, biopsy - Fibroadipose tissue with minute focus of invasive squamous cell carcinoma. \par 2. left supraorbital mass lateral margin, biopsy - Fibroadipose and nerve tissue with no malignancy identified. \par 3. left supraorbital mass, medial margin, biopsy - Fibroadipose tissue and skeletal muscle with no malignancy identified. \par 4. left supraorbital tumor, excision - Invasive squamous cell carcinoma, well differentiated, 1.0 cm in greatest dimension. \par - Perineural invasion is present.\par  - No lymphovascular invasion identified. \par - The resection margins appear to be negative for carcinoma. The tumor is very close (less than 0.1 mm) to the nearest margin (posterior). \par \par He was seen by Dr. Tom Sanchez of medical oncology where no further treatment is indicated and MRI orbit was performed on 4/14/22  which showed no increasing soft tissue thickening or new nodularity in the region of the left eyebrow to suggest tumor recurrence.\par \par Surgical Pathology (1/4/22):\par 1. Skin, left temple, wide excision\par - Squamous cell carcinoma, well to moderately differentiated, 0.7 cm in greatest dimension.\par - No lymphovascular or perineural invasion identified.\par - The tumor is 0.2 cm from the nearest peripheral margin (inferior). The tumor is focally at or very close to the deep margin (obscured by cautery artifact).\par - Biopsy site changes.\par 2. Skin, left temple, deep margin, biopsy- Benign fibrous tissue with biopsy site changes and cautery artifact.\par \par He was diagnosed with a new squamous cell carcinoma at the edge of his left temple skin graft from a prior resection on biopsy by Dr. Morgan of plastic surgery.\par \par S/p squamous cell carcinoma of left eyebrow excision 8/3/21. \par He was seen by Dr. Espino who performed Mohs surgery  but stopped the procedure due to a persistently positive deep margin.  The pt. has a hx or a renal transplant on immunosuppression and had a prior SCC resected from the left zygomatic region with adjuvant radiation tx.\par \par Pathology (8/13/21): \par Left mid eyebrow ([id number], part1, 1 slide), shave biopsy: \par - Squamous cell carcinoma, invasive, keratoacanthoma type, extending to base of biopsy. \par \par Pathology (8/3/21):\par 1. Skin, left eyebrow, excision \par - Minute residual foci of invasive squamous cell carcinoma, moderately differentiated. \par - Marked ulceration, inflammatory changes and necroinflammatory debris. \par - No lymphovascular invasion or perineural invasion identified. \par - The resection margins are negative for carcinoma. The tumor is 0.2 cm from the nearest margin (lateral). \par 2. Skin, left eyebrow, additional deep margin, biopsy \par - Fibroadipose tissue negative for carcinoma. \par 3. Skin, left eyebrow, additional superior margin, biopsy \par - Skin with focal perineural invasion within the reticular dermis (see comment). \par - No other invasive in situ carcinoma identified. \par \par Pt. was seen by Dr. Sanchez on 10/12/2021.  Given that the patient had prior RT to the left temple, pt. is unable to have RT again. Adjuvant chemo and immunotherapy is not recommended at this time.  Pt. will undergo close observation. \par \par MRI Orbits (7/31/21): Soft tissue defect overlying the left frontal scalp, consistent with history of neoplasm resection. Soft tissue swelling and enhancement of the left periorbital, premaxillary, and left frontal extracalvarial soft tissues, likely postsurgical in nature. Underlying osseous structures and intraorbital contents unremarkable. No evidence for osseous or orbital metastases. \par \par Dermatopathology Report (7/14/21):\par Left Mid Eyebrow\par -Squamous cell carcinoma, keratoacanthoma type; transected \par \par PMHx: HTN, SCC, Renal transplant (from wife)- 2004, Allergies to Zithromax

## 2022-10-31 ENCOUNTER — NON-APPOINTMENT (OUTPATIENT)
Age: 62
End: 2022-10-31

## 2022-11-01 ENCOUNTER — TRANSCRIPTION ENCOUNTER (OUTPATIENT)
Age: 62
End: 2022-11-01

## 2022-11-16 ENCOUNTER — TRANSCRIPTION ENCOUNTER (OUTPATIENT)
Age: 62
End: 2022-11-16

## 2022-11-18 ENCOUNTER — TRANSCRIPTION ENCOUNTER (OUTPATIENT)
Age: 62
End: 2022-11-18

## 2022-11-18 RX ORDER — TACROLIMUS 1 MG/1
1 CAPSULE ORAL
Qty: 60 | Refills: 0 | Status: DISCONTINUED | COMMUNITY
End: 2022-11-18

## 2022-11-21 ENCOUNTER — TRANSCRIPTION ENCOUNTER (OUTPATIENT)
Age: 62
End: 2022-11-21

## 2022-11-21 ENCOUNTER — APPOINTMENT (OUTPATIENT)
Dept: CT IMAGING | Facility: IMAGING CENTER | Age: 62
End: 2022-11-21

## 2022-11-21 ENCOUNTER — OUTPATIENT (OUTPATIENT)
Dept: OUTPATIENT SERVICES | Facility: HOSPITAL | Age: 62
LOS: 1 days | End: 2022-11-21
Payer: COMMERCIAL

## 2022-11-21 DIAGNOSIS — C44.92 SQUAMOUS CELL CARCINOMA OF SKIN, UNSPECIFIED: ICD-10-CM

## 2022-11-21 PROCEDURE — 71250 CT THORAX DX C-: CPT | Mod: 26

## 2022-11-21 PROCEDURE — 71250 CT THORAX DX C-: CPT

## 2022-11-22 ENCOUNTER — APPOINTMENT (OUTPATIENT)
Dept: FAMILY MEDICINE | Facility: CLINIC | Age: 62
End: 2022-11-22

## 2022-11-22 VITALS
DIASTOLIC BLOOD PRESSURE: 77 MMHG | RESPIRATION RATE: 17 BRPM | BODY MASS INDEX: 27.34 KG/M2 | TEMPERATURE: 98.6 F | WEIGHT: 196 LBS | SYSTOLIC BLOOD PRESSURE: 124 MMHG | HEART RATE: 118 BPM | OXYGEN SATURATION: 98 %

## 2022-11-22 DIAGNOSIS — R63.4 ABNORMAL WEIGHT LOSS: ICD-10-CM

## 2022-11-22 DIAGNOSIS — E66.9 OBESITY, UNSPECIFIED: ICD-10-CM

## 2022-11-22 DIAGNOSIS — E66.3 OVERWEIGHT: ICD-10-CM

## 2022-11-22 DIAGNOSIS — N18.30 CHRONIC KIDNEY DISEASE, STAGE 3 UNSPECIFIED: ICD-10-CM

## 2022-11-22 DIAGNOSIS — R06.09 OTHER FORMS OF DYSPNEA: ICD-10-CM

## 2022-11-22 PROCEDURE — 99214 OFFICE O/P EST MOD 30 MIN: CPT

## 2022-11-22 RX ORDER — TACROLIMUS 5 MG/1
5 CAPSULE ORAL
Qty: 30 | Refills: 0 | Status: ACTIVE | COMMUNITY
Start: 2022-11-22

## 2022-11-24 PROBLEM — E66.3 OVERWEIGHT: Status: ACTIVE | Noted: 2022-11-24

## 2022-11-24 PROBLEM — R63.4 WEIGHT LOSS, ABNORMAL: Status: ACTIVE | Noted: 2022-11-24

## 2022-11-24 PROBLEM — N18.30 CHRONIC KIDNEY DISEASE (CKD), STAGE III (MODERATE): Status: ACTIVE | Noted: 2018-11-12

## 2022-11-24 PROBLEM — R06.09 DYSPNEA ON EXERTION: Status: ACTIVE | Noted: 2022-11-24

## 2022-11-24 NOTE — REVIEW OF SYSTEMS
[Recent Change In Weight] : ~T recent weight change [Postnasal Drip] : postnasal drip [Nasal Discharge] : nasal discharge [Shortness Of Breath] : shortness of breath [Cough] : cough [Dyspnea on Exertion] : dyspnea on exertion [Nocturia] : nocturia [Back Pain] : back pain [Negative] : Heme/Lymph [Wheezing] : no wheezing [Dysuria] : no dysuria [Incontinence] : no incontinence [Hesitancy] : no hesitancy [Hematuria] : no hematuria [Frequency] : no frequency [Impotence] : no impotency [FreeTextEntry2] : weight loss of about 30 pounds secondary to tx for sqamous cell ca of the right eyebrow [FreeTextEntry8] : s/p renal transplant on immunosuppressants, BPH sx [FreeTextEntry9] : mild rare low back stiffness on occasion [de-identified] : scar of the left eyebrow with no gross lesion at this time but recurrence seen on scans lateral aspect of the scar- bx proven recurrent squamous carcinoma

## 2022-11-24 NOTE — ASSESSMENT
[FreeTextEntry1] : after review of recent labs with patient and his wife- advised follow up with the transplant team - if not able to get some better assistance regarding this issue from them consider infectious disease. consultation. Hold off on additional labs at this time pending transplant's assessment\par

## 2022-11-24 NOTE — PHYSICAL EXAM
[No JVD] : no jugular venous distention [Normal Rate] : normal rate  [Regular Rhythm] : with a regular rhythm [Normal Affect] : the affect was normal [Alert and Oriented x3] : oriented to person, place, and time [Normal Insight/Judgement] : insight and judgment were intact [de-identified] : no distress at rest [de-identified] : well healed radiation area over the left eye- there is no eyebrow remaining and he has no trigeminal nerve function on the left as was anticipated given the radiation tx, some loss of hair around the ear as well.,

## 2022-11-24 NOTE — HISTORY OF PRESENT ILLNESS
[Spouse] : spouse [FreeTextEntry8] : CC: worsening sob and severe fatigue, reduced appetite, chills without real fever. Per his wife he does feel warm to touch but thermometer does not read a fever over 100.4. The patient denies any cough, he is significantly sob with even minimal exertion and this has been going on and getting worse since the radiation treatments. He has also been off the sirolimus and on a combination of tacrolimus and mycophenylate for rejection prevention. He has had covid but these symptoms were not immediately following that. \par Wife also states that he has been getting progressively worse and he says he has worked from home remotely for the last week due to inability to go up the steps at his office at work. He has been in touch with the [facility] transplant team and awaiting advice as to next steps for this issue. Recent labs from transplant team reviewed and basically at his baseline. creatinine had been higher but then has returned to his baseline of 2. other labs including WBC and hb are at his baseline. Electrolytes, lft's are all normal. Consideration for some inflammatory process verses infectious process as a cause for  his current sx. Denies urinary complaints, or other cardiac sx such as palpitations or chest pain. He has had good recovery of the area after radiation tx. Reviewed his recent CT of the chest ans there is some interstitial process in the right lung with non-specific findings.

## 2022-11-29 ENCOUNTER — NON-APPOINTMENT (OUTPATIENT)
Age: 62
End: 2022-11-29

## 2022-11-29 ENCOUNTER — TRANSCRIPTION ENCOUNTER (OUTPATIENT)
Age: 62
End: 2022-11-29

## 2022-11-30 ENCOUNTER — NON-APPOINTMENT (OUTPATIENT)
Age: 62
End: 2022-11-30

## 2022-11-30 DIAGNOSIS — K21.9 GASTRO-ESOPHAGEAL REFLUX DISEASE W/OUT ESOPHAGITIS: ICD-10-CM

## 2022-12-01 ENCOUNTER — TRANSCRIPTION ENCOUNTER (OUTPATIENT)
Age: 62
End: 2022-12-01

## 2022-12-08 ENCOUNTER — APPOINTMENT (OUTPATIENT)
Dept: PULMONOLOGY | Facility: CLINIC | Age: 62
End: 2022-12-08

## 2022-12-08 VITALS
BODY MASS INDEX: 26.88 KG/M2 | DIASTOLIC BLOOD PRESSURE: 80 MMHG | TEMPERATURE: 97.9 F | HEIGHT: 71 IN | SYSTOLIC BLOOD PRESSURE: 127 MMHG | WEIGHT: 192 LBS | OXYGEN SATURATION: 98 % | RESPIRATION RATE: 17 BRPM | HEART RATE: 94 BPM

## 2022-12-08 DIAGNOSIS — R93.89 ABNORMAL FINDINGS ON DIAGNOSTIC IMAGING OF OTHER SPECIFIED BODY STRUCTURES: ICD-10-CM

## 2022-12-08 DIAGNOSIS — R05.3 CHRONIC COUGH: ICD-10-CM

## 2022-12-08 DIAGNOSIS — R09.82 POSTNASAL DRIP: ICD-10-CM

## 2022-12-08 PROCEDURE — 99204 OFFICE O/P NEW MOD 45 MIN: CPT | Mod: 25

## 2022-12-08 PROCEDURE — 94727 GAS DIL/WSHOT DETER LNG VOL: CPT

## 2022-12-08 PROCEDURE — 95012 NITRIC OXIDE EXP GAS DETER: CPT

## 2022-12-08 PROCEDURE — 94010 BREATHING CAPACITY TEST: CPT

## 2022-12-08 PROCEDURE — 94729 DIFFUSING CAPACITY: CPT

## 2022-12-08 PROCEDURE — ZZZZZ: CPT

## 2022-12-08 PROCEDURE — 94618 PULMONARY STRESS TESTING: CPT

## 2022-12-08 RX ORDER — AZELASTINE HYDROCHLORIDE 137 UG/1
0.1 SPRAY, METERED NASAL TWICE DAILY
Qty: 1 | Refills: 1 | Status: ACTIVE | COMMUNITY
Start: 2022-12-08 | End: 1900-01-01

## 2022-12-08 NOTE — REASON FOR VISIT
[Initial] : an initial visit [Cough] : cough [Abnormal CXR/ Chest CT] : an abnormal CXR/ chest CT [Shortness of Breath] : shortness of breath

## 2022-12-12 NOTE — REVIEW OF SYSTEMS
[Postnasal Drip] : postnasal drip [Cough] : cough [Negative] : Endocrine [Chest Tightness] : no chest tightness [Sputum] : no sputum [Dyspnea] : no dyspnea [Wheezing] : no wheezing [SOB on Exertion] : no sob on exertion

## 2022-12-12 NOTE — PROCEDURE
Patient is a 89y old  Female who presents with a chief complaint of Fever (2018 21:31)      INTERVAL HPI/OVERNIGHT EVENTS:  Patient seen and examined at bedside. No acute events overnight. Patient is awake but not oriented, good appetite, tolerating PO intake, voiding through Jones, last BM yesterday, watery     ROS: Unable to assess since patient is disoriented    Vitals: Comfort measures only      General: Well nourished/Well developed, patient sleeping on the bed, arousable, interactive  	Head:  Normocephalic, atraumatic  	ENT: Mucosa moist, no ulcerations  	Neck: Supple, no sinuses or palpable masses  	Respiratory: CTA B/L  	CV: RRR, S1S2, no murmur  	Abdominal: Firm, generalized tenderness, distended, possibly palpable epigastric mass  	Extremities: No edema, + peripheral pulses, FROM all 4 extremity  	MUSCULOSKELETAL: tenderness in lumbar spine at the level of L3 to sacrum. Stage 1 1x2 cm eschar ntoed, intact skin, non infection signs noted  Neurology: A&O in person, no focalization signs    LABS:                        9.3    60.8  )-----------( 99       ( 2018 15:23 )             29.3         133<L>  |  95<L>  |  67.0<H>  ----------------------------<  217<H>  5.1   |  16.0<L>  |  4.66<H>    Ca    9.3      2018 15:23    TPro  6.6  /  Alb  3.8  /  TBili  2.8<H>  /  DBili  x   /  AST  97<H>  /  ALT  110<H>  /  AlkPhos  292<H>      PT/INR - ( 2018 15:23 )   PT: 16.8 sec;   INR: 1.51 ratio         PTT - ( 2018 15:23 )  PTT:31.5 sec  Urinalysis Basic - ( 2018 15:56 )    Color: Yellow / Appearance: Clear / S.020 / pH: x  Gluc: x / Ketone: Negative  / Bili: Negative / Urobili: Negative mg/dL   Blood: x / Protein: 30 mg/dL / Nitrite: Negative   Leuk Esterase: Trace / RBC: 0-2 /HPF / WBC 0-2   Sq Epi: x / Non Sq Epi: Occasional / Bacteria: Many      CAPILLARY BLOOD GLUCOSE        Urinalysis Basic - ( 2018 15:56 )    Color: Yellow / Appearance: Clear / S.020 / pH: x  Gluc: x / Ketone: Negative  / Bili: Negative / Urobili: Negative mg/dL   Blood: x / Protein: 30 mg/dL / Nitrite: Negative   Leuk Esterase: Trace / RBC: 0-2 /HPF / WBC 0-2   Sq Epi: x / Non Sq Epi: Occasional / Bacteria: Many    RADIOLOGY & ADDITIONAL TESTS:    	EXAM:   	 CT Abdomen and Pelvis Without Intravenous Contrast   	EXAM DATE/TIME:   	 2018 11:52 PM   	CLINICAL HISTORY:   	 89 years old, female; Screening exam; Other: R/O mass   	TECHNIQUE:   	 Axial computed tomography images of the abdomen and pelvis without intravenous contrast.   	 Coronal and sagittal reformatted images were created and reviewed.   	COMPARISON:   	 CT ABDOMEN AND PELVIS WITH ORAL [id number] 12:58   	FINDINGS:   	 Lower thorax:  Multiple bilateral pulmonary nodules measuring up to 2.1 cm in   	size, some of which demonstrate central cavitation, compatible with either metastases or atypical pulmonary infection. There are also innumerable tiny pulmonary nodules with the same differential diagnosis.   	ABDOMEN:   	 Liver: Normal. No mass.   	 Gallbladder and bile ducts:  Cholelithiasis. No cholecystitis or biliary ductal dilatation.   	 Pancreas: Normal. No ductal dilation.   	 Spleen: Normal. No splenomegaly.   	 Adrenals: Normal. No mass.   	 Kidneys and ureters:  There is mild to moderate left-sided hydronephrosis a proximal left hydroureter with transition point of the proximal left ureter as it courses into the left lateral aspect of the aforementioned   	retroperitoneal mass, consistent with obstructive uropathy. No obstructive urolithiasis.  Stomach and bowel:  No bowel wall thickening or intestinal obstruction. There is mild fluid distention of the stomach and moderate fluid distention of the duodenum through the mid third portion with transition point as the mass   	courses through the aforementioned retroperitoneal mass, consistent with partial obstruction secondary to the mass.  	 Appendix:  Appendix not visualized. No evidence of appendicitis.   	 Retroperitoneal space:  6 cm mass, presumably malignant, confluent with both the body of the pancreas and the superior periaortic retroperitoneum, most   	likely primary pancreatic carcinoma.   	PELVIS:   	 Bladder: Unremarkable as visualized.  Reproductive:  Prior hysterectomy.   	ABDOMEN and PELVIS:   	 Intraperitoneal space: Normal. No free air. No significant fluid collection.   	 Bones/joints:  Multilevel chronic compression deformities of the lumbar spine.   	Indeterminate sclerosis of the anterosuperior left iliac bone. Metastatic disease not excluded.   	 Soft tissues: Unremarkable.   	 Vasculature: Normal. No abdominal aortic aneurysm.   	 Lymph nodes: Normal. No enlarged lymph nodes.     	IMPRESSION:   	1. Multiple bilateral pulmonary nodules measuring up to 2.1 cm in size, some of which demonstrate central cavitation, compatible with either metastases or atypical pulmonary infection. There are also innumerable iny pulmonary nodules with the same differential diagnosis.   	2. 6 cm mass, presumably malignant, confluent with both the body of the pancreas and the superior eriaortic retroperitoneum, most likely primary pancreatic carcinoma.   	3. There is mild to moderate left-sided hydronephrosis a proximal left hydroureter with transition point of the proximal left ureter as it courses into the left lateral aspect of the aforementioned retroperitoneal ass, consistent with obstructive uropathy. No obstructive urolithiasis.   	4. There is mild fluid distention of the stomach and moderate fluid distention of the duodenum through the mid third portion with transition point as the mass courses through the aforementioned retroperitoneal ass, consistent with partial obstruction secondary to the mass.   	5. Indeterminate sclerosis of the anterosuperior left iliac bone.   	Metastatic disease not excluded.  	******PRELIMINARY REPORT******    	******PRELIMINARY REPORT******        NIKI RICK M.D.;AD RADIOLOGIST      MEDICATIONS  (STANDING):  dextrose 5%. 1000 milliLiter(s) (50 mL/Hr) IV Continuous <Continuous>  dextrose 50% Injectable 12.5 Gram(s) IV Push once  dextrose 50% Injectable 25 Gram(s) IV Push once  dextrose 50% Injectable 25 Gram(s) IV Push once  fentaNYL   Patch  50 MICROgram(s)/Hr 1 Patch Transdermal every 72 hours  pantoprazole    Tablet 40 milliGRAM(s) Oral before breakfast  sodium chloride 0.9%. 1000 milliLiter(s) (100 mL/Hr) IV Continuous <Continuous>    MEDICATIONS  (PRN):  dextrose 40% Gel 15 Gram(s) Oral once PRN Blood Glucose LESS THAN 70 milliGRAM(s)/deciliter  glucagon  Injectable 1 milliGRAM(s) IntraMuscular once PRN Glucose LESS THAN 70 milligrams/deciliter  LORazepam     Tablet 0.5 milliGRAM(s) Oral every 6 hours PRN Anxiety  melatonin 3 milliGRAM(s) Oral at bedtime PRN Insomnia  morphine  - Injectable 2 milliGRAM(s) IV Push every 1 hour PRN Moderate Pain (4 - 6) or resp rate > 18 or agitation  ondansetron Injectable 4 milliGRAM(s) IV Push every 6 hours PRN Nausea and/or Vomiting Patient is a 89y old  Female who presents with a chief complaint of Fever (2018 21:31)      INTERVAL HPI/OVERNIGHT EVENTS:  Patient seen and examined at bedside. No acute events overnight. Patient is awake but not oriented, good appetite, tolerating PO intake, voiding through [place], last BM yesterday, watery. Patient is DNR/DNI with comfort measures only    ROS: Unable to assess since patient is disoriented    Vitals: Comfort measures only      General: Well nourished/Well developed, patient sleeping on the bed, arousable, interactive  	Head:  Normocephalic, atraumatic  	ENT: Mucosa moist, no ulcerations  	Neck: Supple, no sinuses or palpable masses  	Respiratory: CTA B/L  	CV: RRR, S1S2, no murmur  	Abdominal: Firm, generalized tenderness, distended, possibly palpable epigastric mass  	Extremities: No edema, + peripheral pulses, FROM all 4 extremity  	MUSCULOSKELETAL: tenderness in lumbar spine at the level of L3 to sacrum. Stage 1 1x2 cm eschar ntoed, intact skin, non infection signs noted  Neurology: A&O in person, no focalization signs    LABS:                        9.3    60.8  )-----------( 99       ( 2018 15:23 )             29.3     06-18    133<L>  |  95<L>  |  67.0<H>  ----------------------------<  217<H>  5.1   |  16.0<L>  |  4.66<H>    Ca    9.3      2018 15:23    TPro  6.6  /  Alb  3.8  /  TBili  2.8<H>  /  DBili  x   /  AST  97<H>  /  ALT  110<H>  /  AlkPhos  292<H>  -18    PT/INR - ( 2018 15:23 )   PT: 16.8 sec;   INR: 1.51 ratio         PTT - ( 2018 15:23 )  PTT:31.5 sec  Urinalysis Basic - ( 2018 15:56 )    Color: Yellow / Appearance: Clear / S.020 / pH: x  Gluc: x / Ketone: Negative  / Bili: Negative / Urobili: Negative mg/dL   Blood: x / Protein: 30 mg/dL / Nitrite: Negative   Leuk Esterase: Trace / RBC: 0-2 /HPF / WBC 0-2   Sq Epi: x / Non Sq Epi: Occasional / Bacteria: Many      CAPILLARY BLOOD GLUCOSE        Urinalysis Basic - ( 2018 15:56 )    Color: Yellow / Appearance: Clear / S.020 / pH: x  Gluc: x / Ketone: Negative  / Bili: Negative / Urobili: Negative mg/dL   Blood: x / Protein: 30 mg/dL / Nitrite: Negative   Leuk Esterase: Trace / RBC: 0-2 /HPF / WBC 0-2   Sq Epi: x / Non Sq Epi: Occasional / Bacteria: Many    RADIOLOGY & ADDITIONAL TESTS:    	EXAM:   	 CT Abdomen and Pelvis Without Intravenous Contrast   	EXAM DATE/TIME:   	 2018 11:52 PM   	CLINICAL HISTORY:   	 89 years old, female; Screening exam; Other: R/O mass   	TECHNIQUE:   	 Axial computed tomography images of the abdomen and pelvis without intravenous contrast.   	 Coronal and sagittal reformatted images were created and reviewed.   	COMPARISON:   	 CT ABDOMEN AND PELVIS WITH ORAL [id number] 12:58   	FINDINGS:   	 Lower thorax:  Multiple bilateral pulmonary nodules measuring up to 2.1 cm in   	size, some of which demonstrate central cavitation, compatible with either metastases or atypical pulmonary infection. There are also innumerable tiny pulmonary nodules with the same differential diagnosis.   	ABDOMEN:   	 Liver: Normal. No mass.   	 Gallbladder and bile ducts:  Cholelithiasis. No cholecystitis or biliary ductal dilatation.   	 Pancreas: Normal. No ductal dilation.   	 Spleen: Normal. No splenomegaly.   	 Adrenals: Normal. No mass.   	 Kidneys and ureters:  There is mild to moderate left-sided hydronephrosis a proximal left hydroureter with transition point of the proximal left ureter as it courses into the left lateral aspect of the aforementioned   	retroperitoneal mass, consistent with obstructive uropathy. No obstructive urolithiasis.  Stomach and bowel:  No bowel wall thickening or intestinal obstruction. There is mild fluid distention of the stomach and moderate fluid distention of the duodenum through the mid third portion with transition point as the mass   	courses through the aforementioned retroperitoneal mass, consistent with partial obstruction secondary to the mass.  	 Appendix:  Appendix not visualized. No evidence of appendicitis.   	 Retroperitoneal space:  6 cm mass, presumably malignant, confluent with both the body of the pancreas and the superior periaortic retroperitoneum, most   	likely primary pancreatic carcinoma.   	PELVIS:   	 Bladder: Unremarkable as visualized.  Reproductive:  Prior hysterectomy.   	ABDOMEN and PELVIS:   	 Intraperitoneal space: Normal. No free air. No significant fluid collection.   	 Bones/joints:  Multilevel chronic compression deformities of the lumbar spine.   	Indeterminate sclerosis of the anterosuperior left iliac bone. Metastatic disease not excluded.   	 Soft tissues: Unremarkable.   	 Vasculature: Normal. No abdominal aortic aneurysm.   	 Lymph nodes: Normal. No enlarged lymph nodes.     	IMPRESSION:   	1. Multiple bilateral pulmonary nodules measuring up to 2.1 cm in size, some of which demonstrate central cavitation, compatible with either metastases or atypical pulmonary infection. There are also innumerable iny pulmonary nodules with the same differential diagnosis.   	2. 6 cm mass, presumably malignant, confluent with both the body of the pancreas and the superior eriaortic retroperitoneum, most likely primary pancreatic carcinoma.   	3. There is mild to moderate left-sided hydronephrosis a proximal left hydroureter with transition point of the proximal left ureter as it courses into the left lateral aspect of the aforementioned retroperitoneal ass, consistent with obstructive uropathy. No obstructive urolithiasis.   	4. There is mild fluid distention of the stomach and moderate fluid distention of the duodenum through the mid third portion with transition point as the mass courses through the aforementioned retroperitoneal ass, consistent with partial obstruction secondary to the mass.   	5. Indeterminate sclerosis of the anterosuperior left iliac bone.   	Metastatic disease not excluded.  	******PRELIMINARY REPORT******    	******PRELIMINARY REPORT******        NIKI RICK M.D.;[facility] RADIOLOGIST      MEDICATIONS  (STANDING):  dextrose 5%. 1000 milliLiter(s) (50 mL/Hr) IV Continuous <Continuous>  dextrose 50% Injectable 12.5 Gram(s) IV Push once  dextrose 50% Injectable 25 Gram(s) IV Push once  dextrose 50% Injectable 25 Gram(s) IV Push once  fentaNYL   Patch  50 MICROgram(s)/Hr 1 Patch Transdermal every 72 hours  pantoprazole    Tablet 40 milliGRAM(s) Oral before breakfast  sodium chloride 0.9%. 1000 milliLiter(s) (100 mL/Hr) IV Continuous <Continuous>    MEDICATIONS  (PRN):  dextrose 40% Gel 15 Gram(s) Oral once PRN Blood Glucose LESS THAN 70 milliGRAM(s)/deciliter  glucagon  Injectable 1 milliGRAM(s) IntraMuscular once PRN Glucose LESS THAN 70 milligrams/deciliter  LORazepam     Tablet 0.5 milliGRAM(s) Oral every 6 hours PRN Anxiety  melatonin 3 milliGRAM(s) Oral at bedtime PRN Insomnia  morphine  - Injectable 2 milliGRAM(s) IV Push every 1 hour PRN Moderate Pain (4 - 6) or resp rate > 18 or agitation  ondansetron Injectable 4 milliGRAM(s) IV Push every 6 hours PRN Nausea and/or Vomiting Patient is a 89y old  Female who presents with a chief complaint of Fever (2018 21:31)    INTERVAL HPI/OVERNIGHT EVENTS:  Patient seen and examined at bedside. No acute events overnight. Patient is awake but not oriented,  voiding through [place], last BM yesterday, watery. Patient is DNR/DNI with comfort measures only    ROS: Unable to assess since patient is disoriented    Vitals: Comfort measures only    General: Well nourished/Well developed, patient sleeping on the bed, arousable, interactive  	Head:  Normocephalic, atraumatic  	ENT: Mucosa moist, no ulcerations  	Neck: Supple, no sinuses or palpable masses  	Respiratory: CTA B/L only on anterior fields   	CV: RRR, S1S2, no murmur  	Abdominal: Firm, generalized tenderness on deep palpation , distended, possibly palpable epigastric mass  	Extremities: No edema, + peripheral pulses, FROM all 4 extremity  	MUSCULOSKELETAL: tenderness in lumbar spine at the level of L3 to sacrum. Stage 1 1x2 cm eschar ntoed, intact skin, non infection signs noted  Neurology: A&O in person, no focalization signs    LABS:                        9.3    60.8  )-----------( 99       ( 2018 15:23 )             29.3     06-18    133<L>  |  95<L>  |  67.0<H>  ----------------------------<  217<H>  5.1   |  16.0<L>  |  4.66<H>    Ca    9.3      2018 15:23    TPro  6.6  /  Alb  3.8  /  TBili  2.8<H>  /  DBili  x   /  AST  97<H>  /  ALT  110<H>  /  AlkPhos  292<H>  -18    PT/INR - ( 2018 15:23 )   PT: 16.8 sec;   INR: 1.51 ratio         PTT - ( 2018 15:23 )  PTT:31.5 sec  Urinalysis Basic - ( 2018 15:56 )    Color: Yellow / Appearance: Clear / S.020 / pH: x  Gluc: x / Ketone: Negative  / Bili: Negative / Urobili: Negative mg/dL   Blood: x / Protein: 30 mg/dL / Nitrite: Negative   Leuk Esterase: Trace / RBC: 0-2 /HPF / WBC 0-2   Sq Epi: x / Non Sq Epi: Occasional / Bacteria: Many      CAPILLARY BLOOD GLUCOSE        Urinalysis Basic - ( 2018 15:56 )    Color: Yellow / Appearance: Clear / S.020 / pH: x  Gluc: x / Ketone: Negative  / Bili: Negative / Urobili: Negative mg/dL   Blood: x / Protein: 30 mg/dL / Nitrite: Negative   Leuk Esterase: Trace / RBC: 0-2 /HPF / WBC 0-2   Sq Epi: x / Non Sq Epi: Occasional / Bacteria: Many    RADIOLOGY & ADDITIONAL TESTS:    	EXAM:   	 CT Abdomen and Pelvis Without Intravenous Contrast   	EXAM DATE/TIME:   	 2018 11:52 PM   	CLINICAL HISTORY:   	 89 years old, female; Screening exam; Other: R/O mass   	TECHNIQUE:   	 Axial computed tomography images of the abdomen and pelvis without intravenous contrast.   	 Coronal and sagittal reformatted images were created and reviewed.   	COMPARISON:   	 CT ABDOMEN AND PELVIS WITH ORAL [id number] 12:58   	FINDINGS:   	 Lower thorax:  Multiple bilateral pulmonary nodules measuring up to 2.1 cm in   	size, some of which demonstrate central cavitation, compatible with either metastases or atypical pulmonary infection. There are also innumerable tiny pulmonary nodules with the same differential diagnosis.   	ABDOMEN:   	 Liver: Normal. No mass.   	 Gallbladder and bile ducts:  Cholelithiasis. No cholecystitis or biliary ductal dilatation.   	 Pancreas: Normal. No ductal dilation.   	 Spleen: Normal. No splenomegaly.   	 Adrenals: Normal. No mass.   	 Kidneys and ureters:  There is mild to moderate left-sided hydronephrosis a proximal left hydroureter with transition point of the proximal left ureter as it courses into the left lateral aspect of the aforementioned   	retroperitoneal mass, consistent with obstructive uropathy. No obstructive urolithiasis.  Stomach and bowel:  No bowel wall thickening or intestinal obstruction. There is mild fluid distention of the stomach and moderate fluid distention of the duodenum through the mid third portion with transition point as the mass   	courses through the aforementioned retroperitoneal mass, consistent with partial obstruction secondary to the mass.  	 Appendix:  Appendix not visualized. No evidence of appendicitis.   	 Retroperitoneal space:  6 cm mass, presumably malignant, confluent with both the body of the pancreas and the superior periaortic retroperitoneum, most   	likely primary pancreatic carcinoma.   	PELVIS:   	 Bladder: Unremarkable as visualized.  Reproductive:  Prior hysterectomy.   	ABDOMEN and PELVIS:   	 Intraperitoneal space: Normal. No free air. No significant fluid collection.   	 Bones/joints:  Multilevel chronic compression deformities of the lumbar spine.   	Indeterminate sclerosis of the anterosuperior left iliac bone. Metastatic disease not excluded.   	 Soft tissues: Unremarkable.   	 Vasculature: Normal. No abdominal aortic aneurysm.   	 Lymph nodes: Normal. No enlarged lymph nodes.     	IMPRESSION:   	1. Multiple bilateral pulmonary nodules measuring up to 2.1 cm in size, some of which demonstrate central cavitation, compatible with either metastases or atypical pulmonary infection. There are also innumerable iny pulmonary nodules with the same differential diagnosis.   	2. 6 cm mass, presumably malignant, confluent with both the body of the pancreas and the superior eriaortic retroperitoneum, most likely primary pancreatic carcinoma.   	3. There is mild to moderate left-sided hydronephrosis a proximal left hydroureter with transition point of the proximal left ureter as it courses into the left lateral aspect of the aforementioned retroperitoneal ass, consistent with obstructive uropathy. No obstructive urolithiasis.   	4. There is mild fluid distention of the stomach and moderate fluid distention of the duodenum through the mid third portion with transition point as the mass courses through the aforementioned retroperitoneal ass, consistent with partial obstruction secondary to the mass.   	5. Indeterminate sclerosis of the anterosuperior left iliac bone.   	Metastatic disease not excluded.  	******PRELIMINARY REPORT******    	******PRELIMINARY REPORT******        NIKI RICK M.D.;[facility] RADIOLOGIST      MEDICATIONS  (STANDING):  dextrose 5%. 1000 milliLiter(s) (50 mL/Hr) IV Continuous <Continuous>  dextrose 50% Injectable 12.5 Gram(s) IV Push once  dextrose 50% Injectable 25 Gram(s) IV Push once  dextrose 50% Injectable 25 Gram(s) IV Push once  fentaNYL   Patch  50 MICROgram(s)/Hr 1 Patch Transdermal every 72 hours  pantoprazole    Tablet 40 milliGRAM(s) Oral before breakfast  sodium chloride 0.9%. 1000 milliLiter(s) (100 mL/Hr) IV Continuous <Continuous>    MEDICATIONS  (PRN):  dextrose 40% Gel 15 Gram(s) Oral once PRN Blood Glucose LESS THAN 70 milliGRAM(s)/deciliter  glucagon  Injectable 1 milliGRAM(s) IntraMuscular once PRN Glucose LESS THAN 70 milligrams/deciliter  LORazepam     Tablet 0.5 milliGRAM(s) Oral every 6 hours PRN Anxiety  melatonin 3 milliGRAM(s) Oral at bedtime PRN Insomnia  morphine  - Injectable 2 milliGRAM(s) IV Push every 1 hour PRN Moderate Pain (4 - 6) or resp rate > 18 or agitation  ondansetron Injectable 4 milliGRAM(s) IV Push every 6 hours PRN Nausea and/or Vomiting [FreeTextEntry1] : PFT's performed in office show no obstructive lung defect.  There is a mild restrictive lung defect and a mild decrease in diffusion capacity.\par FEV1: 107% \par FEV1/FVC Ratio: 81% \par CLI19-85%: 118% \par DLCO: 70% \par \par Feno: 11 ppb (WNL).\par \par 6MWT Performed in office WNL. \par RA SPO2 @ REST: 98% \par RA SPO2 @ EXERTION: 94% \par

## 2022-12-12 NOTE — HISTORY OF PRESENT ILLNESS
[Never] : never [TextBox_4] : Mr. Cruz is a 62 year old, nonsmoking, male. He has past medical history of GERD, HTN, BPH, CKD w/ Kidney Transplant 2004, Assumed Covid-19 infection (3/2020) w/ lingering symptoms x 1 month d/t transplant history (no hospitalization), Seasonal Allergies (on Allergy Shots x 20 years, followed by Dr. 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 Patient is a Microbiologist. He presents for an initial pulmonary evaluation.\par \par His chief concern is cough x 2 months. 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 He admits to symptoms of SOB on exertion, loss of appetite, & cough. He states after discussing these symptoms with his MD team he was RX'ed a Chest CT which he had done 11/21/22. Patient states he was then sent to [facility] where he had transplant surgery in the past for further evaluation. He notes upon reporting to [facility], they found that he had a bladder infection. He was prescribed Amoxicillin for it and he completed it on Monday, 12/5. Patient states post antibiotic therapy he feels much better.  He notes only lingering symptom of slight cough, which feels more post nasal drip in nature. Patient admits symptoms of SOB on minimal exertion and loss of appetite have resolved. Patient states he feels 80% better. Patient states 6MWT today was more than he could have done over the last two weeks. \par \par Patient admits to assumed Covid-19 infection in March 2020.  He notes March-April 2020 Febrile daily x 1 month & lost 20 lbs.\par \par He states he feels greatly improved regarding above concerns. \par

## 2022-12-12 NOTE — ASSESSMENT
[FreeTextEntry1] : Mr. Cruz is a 62 year old, nonsmoking, male. He has past medical history of GERD, HTN, BPH, CKD w/ Kidney Transplant 2004, Assumed Covid-19 infection (3/2020) w/ lingering symptoms x 1 month d/t transplant history (no hospitalization), Seasonal Allergies (on Allergy Shots x 20 years, followed by Dr. 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 \par \par 1. Cough:\par - s/p likely infection as seen on Chest CT s/p Amox Abx therapy for Bladder infection - but feels improved in pulmonary symptoms.\par - r/t post nasal drip: Add Azelastine Nasal Spray. 2 squirts each nostril BID.\par \par 2. Abnormal Chest CT: \par - S/P Chest CT 11/21 next one for 5-6 weeks after to see if improvement/resolution of findings. \par \par Patient to follow up with Dr. Thurman as scheduled.\par Patient to call with further questions and concerns.\par Patient verbalizes understanding of care plan and is agreeable.\par

## 2022-12-12 NOTE — DISCUSSION/SUMMARY
[FreeTextEntry1] : 11/21/2022 CHEST CT COMPARISON: Chest CT 6/9/2022:\par IMPRESSION:\par - New bilateral mild patchy peribronchovascular groundglass opacities, which may represent edema, infection or inflammation in the appropriate clinical setting.\par - Resolved small right upper/middle lobe nodules. No new nodules.\par \par 11/23/22 [place] Transplant Blood tests scanned in.

## 2022-12-20 ENCOUNTER — RX CHANGE (OUTPATIENT)
Age: 62
End: 2022-12-20

## 2022-12-23 ENCOUNTER — TRANSCRIPTION ENCOUNTER (OUTPATIENT)
Age: 62
End: 2022-12-23

## 2023-01-13 ENCOUNTER — APPOINTMENT (OUTPATIENT)
Dept: MRI IMAGING | Facility: IMAGING CENTER | Age: 63
End: 2023-01-13
Payer: COMMERCIAL

## 2023-01-13 ENCOUNTER — OUTPATIENT (OUTPATIENT)
Dept: OUTPATIENT SERVICES | Facility: HOSPITAL | Age: 63
LOS: 1 days | End: 2023-01-13
Payer: COMMERCIAL

## 2023-01-13 DIAGNOSIS — Z98.890 OTHER SPECIFIED POSTPROCEDURAL STATES: Chronic | ICD-10-CM

## 2023-01-13 DIAGNOSIS — C44.92 SQUAMOUS CELL CARCINOMA OF SKIN, UNSPECIFIED: ICD-10-CM

## 2023-01-13 DIAGNOSIS — Z94.0 KIDNEY TRANSPLANT STATUS: Chronic | ICD-10-CM

## 2023-01-13 DIAGNOSIS — Z85.828 PERSONAL HISTORY OF OTHER MALIGNANT NEOPLASM OF SKIN: Chronic | ICD-10-CM

## 2023-01-13 PROCEDURE — A9585: CPT

## 2023-01-13 PROCEDURE — 70542 MRI ORBIT/FACE/NECK W/DYE: CPT

## 2023-01-13 PROCEDURE — 70542 MRI ORBIT/FACE/NECK W/DYE: CPT | Mod: 26

## 2023-01-18 ENCOUNTER — APPOINTMENT (OUTPATIENT)
Dept: SURGICAL ONCOLOGY | Facility: CLINIC | Age: 63
End: 2023-01-18
Payer: COMMERCIAL

## 2023-01-18 VITALS
BODY MASS INDEX: 28 KG/M2 | HEART RATE: 93 BPM | HEIGHT: 71 IN | WEIGHT: 200 LBS | SYSTOLIC BLOOD PRESSURE: 137 MMHG | RESPIRATION RATE: 16 BRPM | DIASTOLIC BLOOD PRESSURE: 77 MMHG | OXYGEN SATURATION: 99 %

## 2023-01-18 PROCEDURE — 99214 OFFICE O/P EST MOD 30 MIN: CPT

## 2023-01-18 NOTE — PHYSICAL EXAM
[Normal] : supple, no neck mass and thyroid not enlarged [Normal Neck Lymph Nodes] : normal neck lymph nodes  [Normal Supraclavicular Lymph Nodes] : normal supraclavicular lymph nodes [Normal Groin Lymph Nodes] : normal groin lymph nodes [Normal Axillary Lymph Nodes] : normal axillary lymph nodes [Normal] : oriented to person, place and time, with appropriate affect [de-identified] : Left eyebrow reconstruction well-healed, no evidence of recurrence, no suspicious skin lesions

## 2023-01-18 NOTE — HISTORY OF PRESENT ILLNESS
[de-identified] : Patient is a 61 y/o male who presents a follow up visit. History significant for CKD w/ renal transplant. 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 \par He completed radiation therapy in September as per Dr. Bragg. He reports undergoing a MRI - pending results. \par \par Surgical Pathology (7/12/22):\par 1. left supraorbital tumor at supraorbital nerve, biopsy - Fibroadipose tissue with minute focus of invasive squamous cell carcinoma. \par 2. left supraorbital mass lateral margin, biopsy - Fibroadipose and nerve tissue with no malignancy identified. \par 3. left supraorbital mass, medial margin, biopsy - Fibroadipose tissue and skeletal muscle with no malignancy identified. \par 4. left supraorbital tumor, excision - Invasive squamous cell carcinoma, well differentiated, 1.0 cm in greatest dimension. \par - Perineural invasion is present.\par  - No lymphovascular invasion identified. \par - The resection margins appear to be negative for carcinoma. The tumor is very close (less than 0.1 mm) to the nearest margin (posterior). \par \par He was seen by Dr. Tom Sanchez of medical oncology where no further treatment is indicated and MRI orbit was performed on 4/14/22  which showed no increasing soft tissue thickening or new nodularity in the region of the left eyebrow to suggest tumor recurrence.\par \par Surgical Pathology (1/4/22):\par 1. Skin, left temple, wide excision\par - Squamous cell carcinoma, well to moderately differentiated, 0.7 cm in greatest dimension.\par - No lymphovascular or perineural invasion identified.\par - The tumor is 0.2 cm from the nearest peripheral margin (inferior). The tumor is focally at or very close to the deep margin (obscured by cautery artifact).\par - Biopsy site changes.\par 2. Skin, left temple, deep margin, biopsy- Benign fibrous tissue with biopsy site changes and cautery artifact.\par \par He was diagnosed with a new squamous cell carcinoma at the edge of his left temple skin graft from a prior resection on biopsy by Dr. Morgan of plastic surgery.\par \par S/p squamous cell carcinoma of left eyebrow excision 8/3/21. \par He was seen by Dr. Espino who performed Mohs surgery  but stopped the procedure due to a persistently positive deep margin.  The pt. has a hx or a renal transplant on immunosuppression and had a prior SCC resected from the left zygomatic region with adjuvant radiation tx.\par \par Pathology (8/13/21): \par Left mid eyebrow ([id number], part1, 1 slide), shave biopsy: \par - Squamous cell carcinoma, invasive, keratoacanthoma type, extending to base of biopsy. \par \par Pathology (8/3/21):\par 1. Skin, left eyebrow, excision \par - Minute residual foci of invasive squamous cell carcinoma, moderately differentiated. \par - Marked ulceration, inflammatory changes and necroinflammatory debris. \par - No lymphovascular invasion or perineural invasion identified. \par - The resection margins are negative for carcinoma. The tumor is 0.2 cm from the nearest margin (lateral). \par 2. Skin, left eyebrow, additional deep margin, biopsy \par - Fibroadipose tissue negative for carcinoma. \par 3. Skin, left eyebrow, additional superior margin, biopsy \par - Skin with focal perineural invasion within the reticular dermis (see comment). \par - No other invasive in situ carcinoma identified. \par \par Pt. was seen by Dr. Sanchez on 10/12/2021.  Given that the patient had prior RT to the left temple, pt. is unable to have RT again. Adjuvant chemo and immunotherapy is not recommended at this time.  Pt. will undergo close observation. \par \par MRI Orbits (7/31/21): Soft tissue defect overlying the left frontal scalp, consistent with history of neoplasm resection. Soft tissue swelling and enhancement of the left periorbital, premaxillary, and left frontal extracalvarial soft tissues, likely postsurgical in nature. Underlying osseous structures and intraorbital contents unremarkable. No evidence for osseous or orbital metastases. \par \par Dermatopathology Report (7/14/21):\par Left Mid Eyebrow\par -Squamous cell carcinoma, keratoacanthoma type; transected \par \par PMHx: HTN, SCC, Renal transplant (from wife)- 2004, Allergies to Zithromax

## 2023-01-18 NOTE — ASSESSMENT
[FreeTextEntry1] : Status post resection left forehead recurrent squamous cell carcinoma- margins negative on final pathology\par S/p radiation therapy as per Dr. Bragg \par Continue dermatologic surveillance \par Will follow up with MRI results \par RTO 3 months \par \par \par \par \par \par \par \par \par \par \par \par \par \par

## 2023-01-18 NOTE — ADDENDUM
[FreeTextEntry1] : I, Myra Bob, acted solely as a scribe for Dr. Rakan Madden on this date 01/18/2023.\par \par \par \par

## 2023-01-19 ENCOUNTER — RX CHANGE (OUTPATIENT)
Age: 63
End: 2023-01-19

## 2023-02-02 ENCOUNTER — APPOINTMENT (OUTPATIENT)
Dept: RADIATION ONCOLOGY | Facility: CLINIC | Age: 63
End: 2023-02-02
Payer: COMMERCIAL

## 2023-02-02 VITALS
HEIGHT: 71 IN | DIASTOLIC BLOOD PRESSURE: 74 MMHG | RESPIRATION RATE: 17 BRPM | SYSTOLIC BLOOD PRESSURE: 124 MMHG | BODY MASS INDEX: 28.19 KG/M2 | WEIGHT: 201.39 LBS | TEMPERATURE: 97.7 F | OXYGEN SATURATION: 100 % | HEART RATE: 81 BPM

## 2023-02-02 PROCEDURE — 99213 OFFICE O/P EST LOW 20 MIN: CPT | Mod: GC

## 2023-02-02 NOTE — HISTORY OF PRESENT ILLNESS
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 Soft tissue swelling and enhancement of the left periorbital, premaxillary, and left frontal extracalvarial soft tissues, likely postsurgical in nature. Underlying osseous structures and intraorbital contents unremarkable. No evidence for osseous or orbital metastases. 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 Surgical Pathology \par 1. Skin, left temple, wide excision Squamous cell carcinoma, well to moderately differentiated, 0.7 cm in greatest dimension.No lymphovascular or perineural invasion identified.The tumor is 0.2 cm from the nearest peripheral margin (inferior). The tumor is focally at or very close to the deep margin (obscured by cautery artifact).Biopsy site changes.\par 2. Skin, left temple, deep margin, biopsy- Benign fibrous tissue with biopsy site changes and cautery artifact.\par \par He was seen by Dr. Tom Sanchez of medical oncology where no further treatment is indicated.\par \par 4/14/22:  MRI orbit was performed which showed no increasing soft tissue thickening or new nodularity in the region of the left eyebrow to suggest tumor recurrence.\par \par 7/12/22: S/p surgical resection of left eyebrow recurrent squamous cell carcinoma with plastic surgery reconstruction. Surgical Pathology: Left supraorbital tumor at supraorbital nerve, biopsy - Fibroadipose tissue with minute focus of invasive squamous cell carcinoma. \par 2. left supraorbital mass lateral margin, biopsy - Fibroadipose and nerve tissue with no malignancy identified. \par 3. left supraorbital mass, medial margin, biopsy - Fibroadipose tissue and skeletal muscle with no malignancy identified. \par 4. left supraorbital tumor, excision - Invasive squamous cell carcinoma, well differentiated, 1.0 cm in greatest dimension. \par - Perineural invasion is present.  No lymphovascular invasion identified. \par - The resection margins appear to be negative for carcinoma. The tumor is very close (less than 0.1 mm) to the nearest margin (posterior). \par \par 9/20/2022 Completed planned RT to left periorbital total dose of 6000 cG\par \par Pt had CT of chest,no contrast on 11/21/22. It showed new bilateral mild patchy peribronchovascular  ground glass opacities which may represent edema,infection or inflammation in the appropriate clinical setting.Resolved small right upper/middle lobe nodules. no new nodules.\par \par Pt had MR orbit,face and or neck with contrast on 1/13/23.Soft tissue thickening and enhancement within the left periorbital soft tissues extending to the left premaxillary and premalar soft tissues which is presumably related to post treatment changes.No imaging evidence of perineural spread of disease.See full discussion in the body of report.\par \par Pt here today in followup.No complaints of pain.Eating well.Skin looks good,no erythema,\par \par

## 2023-02-02 NOTE — REVIEW OF SYSTEMS
[Negative] : Heme/Lymph [Constipation: Grade 0] : Constipation: Grade 0 [Diarrhea: Grade 0] : Diarrhea: Grade 0 [Dysphagia: Grade 0] : Dysphagia: Grade 0 [Nausea: Grade 0] : Nausea: Grade 0 [Vomiting: Grade 0] : Vomiting: Grade 0 [Fatigue: Grade 0] : Fatigue: Grade 0 [Cough: Grade 0] : Cough: Grade 0 [Dyspnea: Grade 0] : Dyspnea: Grade 0 [Skin Hyperpigmentation: Grade 0] : Skin Hyperpigmentation: Grade 0 [Dermatitis Radiation: Grade 0] : Dermatitis Radiation: Grade 0

## 2023-02-03 ENCOUNTER — APPOINTMENT (OUTPATIENT)
Dept: RADIOLOGY | Facility: IMAGING CENTER | Age: 63
End: 2023-02-03
Payer: COMMERCIAL

## 2023-02-03 ENCOUNTER — OUTPATIENT (OUTPATIENT)
Dept: OUTPATIENT SERVICES | Facility: HOSPITAL | Age: 63
LOS: 1 days | End: 2023-02-03
Payer: COMMERCIAL

## 2023-02-03 DIAGNOSIS — Z98.890 OTHER SPECIFIED POSTPROCEDURAL STATES: Chronic | ICD-10-CM

## 2023-02-03 DIAGNOSIS — Z94.0 KIDNEY TRANSPLANT STATUS: Chronic | ICD-10-CM

## 2023-02-03 DIAGNOSIS — Z85.828 PERSONAL HISTORY OF OTHER MALIGNANT NEOPLASM OF SKIN: Chronic | ICD-10-CM

## 2023-02-03 DIAGNOSIS — R93.89 ABNORMAL FINDINGS ON DIAGNOSTIC IMAGING OF OTHER SPECIFIED BODY STRUCTURES: ICD-10-CM

## 2023-02-03 PROCEDURE — 71046 X-RAY EXAM CHEST 2 VIEWS: CPT | Mod: 26

## 2023-02-03 PROCEDURE — 71046 X-RAY EXAM CHEST 2 VIEWS: CPT

## 2023-02-08 ENCOUNTER — TRANSCRIPTION ENCOUNTER (OUTPATIENT)
Age: 63
End: 2023-02-08

## 2023-02-16 ENCOUNTER — APPOINTMENT (OUTPATIENT)
Dept: DERMATOLOGY | Facility: CLINIC | Age: 63
End: 2023-02-16
Payer: COMMERCIAL

## 2023-02-16 VITALS — HEIGHT: 71 IN | WEIGHT: 200 LBS | BODY MASS INDEX: 28 KG/M2

## 2023-02-16 DIAGNOSIS — L57.0 ACTINIC KERATOSIS: ICD-10-CM

## 2023-02-16 DIAGNOSIS — Z12.83 ENCOUNTER FOR SCREENING FOR MALIGNANT NEOPLASM OF SKIN: ICD-10-CM

## 2023-02-16 PROCEDURE — 17000 DESTRUCT PREMALG LESION: CPT

## 2023-02-16 PROCEDURE — 17003 DESTRUCT PREMALG LES 2-14: CPT

## 2023-02-16 PROCEDURE — 99203 OFFICE O/P NEW LOW 30 MIN: CPT | Mod: 25

## 2023-02-19 ENCOUNTER — RX CHANGE (OUTPATIENT)
Age: 63
End: 2023-02-19

## 2023-03-13 ENCOUNTER — APPOINTMENT (OUTPATIENT)
Dept: DERMATOLOGY | Facility: CLINIC | Age: 63
End: 2023-03-13

## 2023-03-20 ENCOUNTER — RX CHANGE (OUTPATIENT)
Age: 63
End: 2023-03-20

## 2023-03-21 NOTE — ASU PATIENT PROFILE, ADULT - NSICDXPASTMEDICALHX_GEN_ALL_CORE_FT
Physical Therapy Evaluation    Visit Type: Initial Evaluation  Visit: 1  Referring Provider: Trav Loco MD  Medical Diagnosis (from order): Diagnosis Information    Diagnosis  723.1 (ICD-9-CM) - M54.2 (ICD-10-CM) - Neck pain       Treatment Diagnosis: cervical with increased pain/symptoms, impaired posture, impaired range of motion, impaired mobility, impaired strength, impaired activity tolerance and impaired muscle length/flexibility.  Onset  - Date of onset:  Chronic cervical pain that has been gradually getting worse in the last couple of months.   Chart reviewed at time of initial evaluation (relevant co-morbidities, allergies, tests and medications listed):   - Diagnostic tests reviewed: MRI studies  unremarkable  Past Medical History:  Unremarkable    No past surgical history on file.        SUBJECTIVE                                                                                                               This is a 60 year-old male with diagnosis of chronic cervical pain that he is experiencing about 9 years ago, pain has been gradually getting worse in the last couple of months. 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 He drives heavy equipment usually a bulldozer, he sits most of the time about 8 -10 hours. He wants to try physical therapy first before he decides to have surgery. He is going to have an epidural injection next week. Patient complains of waking up with his upper extremities numbed. Patient complains of pressure pain in his neck that increases when rains. Patient also complains of dizziness when looking up, sitting in a recliner, reading or looking down. Patient was diagnosed with carpal tunnel syndrome bilaterally.     Pain / Symptoms  - Pain/symptom is: intermittent  - Pain rating (out of 10): Current: 3 ; Best: 0; Worst: 6  - Location: Cervical area to  the level of C4, C5, C6, C7.   - Quality / Description: pressure, ache, numbness  - Alleviating Factors: heat     - Diclofenac   - Progression since onset: worsening    Function:   Limitations / Exacerbation Factors:   - Patient reports pain, difficulty and increased time with function reported below.  - bed mobility  -    Prior Level of Function: no limitation in involved extremity,    Patient Goals: decreased pain.    Prior treatment  - no therapies  - Discharged from hospital, home health, or skilled nursing facility in last 30 days: no  Home Environment   - Patient lives with: alone  - Type of home: single level home  - Assistance available: no assist  - Denies 2 or more falls or an unexplained fall with injury in the last year.  - Feel safe at home / work / school: yes      OBJECTIVE                                                                                                                    Posture:  - Shoulders: rounded  RUE:     - Position: shoulder elevated  Spine: decreased lumbar lordosis  Cervical lordosis decrease.       Range of Motion (ROM)   (degrees unless noted; active unless noted; norms in ( ); negative=lacking to 0, positive=beyond 0)  Shoulder:    - Flexion (180):        • Left:160         • Right: 165    - Abduction (180):        • Left: 170        • Right: 170    - Internal Rotation:        - at 90° (70-90):            • Left: 70           • Right: 70    - External Rotation:       - at 90° (90):            • Left: 90            • Right: 90   Cervical:    - Flexion (45-50): 20°    - Extension (45-60): 40°    - Rotation (60-80):        • Left: 60°        • Right: 65°    - Side Bend (45):        • Left: 25°        • Right: 25°  Comments: Rotation of the head to the right provokes dizziness. Patient reports no pain with shoulder movement.     Strength  (out of 5 unless noted, standard test position unless noted)   Shoulder:     - Flexion:         • Left: 4+         • Right: 5     -  Abduction:        • Left: 4+        • Right: 5    - Internal Rotation:          • Left (at 0°): 5        • Right (at 0°): 5    - External Rotation:         • Left (at 0°): 5        • Right (at 0°) :5          Palpation  Left  - Cervical Paraspinals: tenderness  - Sub Occipitals: tenderness  - Anterior Scalenes: tenderness  - Middle Scalenes: tenderness  - Posterior Scalenes: tenderness  Right  - Cervical Paraspinals: tenderness  - Sub Occipitals: tenderness  - Anterior Scalenes: tenderness  - Middle Scalenes: tenderness  - Posterior Scalenes: tenderness    Muscle Flexibility  - Sub Occipital: • Left: moderate limitation • Right: moderate limitation  - Upper Trapezius:• Left: moderate limitation • Right: moderate limitation        Special Tests  Upper limb neural tension test ulnar positive left    Upper limb neural tension test radial positive right.     Sensation/Dermatome Testing:    Light touch normal.            Outcome/Assessments  Outcome Measures:   Neck Disability Index (NDI): Neck Disability Index Score: 13  NDI Total Possible Score: 50  NDI Score Calculated: 26 %  (scored 0-100, higher score indicates higher disability) see flowsheet for additional documentation        Treatment     Therapeutic Activity  Patient was instructed in the following exercises  ? Neck Retraction - 2 x daily - 7 x weekly - 2 sets - 10 reps  ? Seated Shoulder Circles - 2 x daily - 7 x weekly - 2 sets - 10 reps  ? Neck Rotation - 2 x daily - 7 x weekly - 1-2 sets - 10 reps  ? Neck Sidebending - 2 x daily - 7 x weekly - 1-2 sets - 10 reps  ? Seated Scapular Retraction - 2 x daily - 7 x weekly - 3 sets - 10 reps  ? Seated Scapular Retraction with External Rotation - 2 x daily - 7 x weekly - 1-2 sets - 10 reps  ? Standing Shoulder Flexion Full Range - 2 x daily - 7 x weekly - 1-2 sets - 10 reps  ? Shoulder Abduction - Thumbs Up - 2 x daily - 7 x weekly - 1-2 sets - 10 reps        Skilled input: verbal instruction/cues    Writer  PAST MEDICAL HISTORY:  BPH (benign prostatic hyperplasia)     Chronic GERD     COVID-19 3/2020 - not hospitalized    Diverticulosis     Eyelid cyst     HLD (hyperlipidemia) no longer on medication    HTN (hypertension)     IFG (impaired fasting glucose)     IgA nephropathy     Nephronophthisis     Squamous cell carcinoma of skin 2018 radiation and surgery, surgery 2021     verbally educated and received verbal consent for hand placement, positioning of patient, and techniques to be performed today from patient for therapist position for techniques and hand placement and palpation for techniques as described above and how they are pertinent to the patient's plan of care.    Home Exercise Program  Access Code: [email or web address]  URL: [email or web address]  Date: 03/21/2023  Prepared by: Marcy Nguyen    Exercises  ? Neck Retraction - 2 x daily - 7 x weekly - 2 sets - 10 reps  ? Seated Shoulder Circles - 2 x daily - 7 x weekly - 2 sets - 10 reps  ? Neck Rotation - 2 x daily - 7 x weekly - 1-2 sets - 10 reps  ? Neck Sidebending - 2 x daily - 7 x weekly - 1-2 sets - 10 reps  ? Seated Scapular Retraction - 2 x daily - 7 x weekly - 3 sets - 10 reps  ? Seated Scapular Retraction with External Rotation - 2 x daily - 7 x weekly - 1-2 sets - 10 reps  ? Standing Shoulder Flexion Full Range - 2 x daily - 7 x weekly - 1-2 sets - 10 reps  ? Shoulder Abduction - Thumbs Up - 2 x daily - 7 x weekly - 1-2 sets - 10 reps          ASSESSMENT                                                                                                          60 year old patient has reported functional limitations listed above impacted by signs and symptoms consistent with treatment diagnosis below.  Treatment Diagnosis:   - Involved: cervical.  - Symptoms/impairments: increased pain/symptoms, impaired posture, impaired range of motion, impaired mobility, impaired strength, impaired activity tolerance and impaired muscle length/flexibility.    Fortunato complains of neck pain when waking up with his upper extremities numbed. Patient complains of pressure pain in his neck that increases when rains. Patient also complains of dizziness when looking up, sitting in a recliner, reading or looking down. Patient was diagnosed with carpal tunnel syndrome bilaterally. Patient will benefit with skilled physical  therapy in order to improve cervical pain and numbness, AROM, strength, stability, function and gait pattern.    Pain/symptoms after session (out of 10): 3    Prognosis: patient will benefit from skilled therapy  Rehabilitative: good.  Predicted patient presentation: Moderate (evolving) - Patient comorbidities and complexities, as defined above, may have varying impact on steady progress for prescribed plan of care.    Education:   - Present and ready to learn: patient  - Results of above outlined education: Verbalizes understanding    PLAN                                                                                                                         The following skilled interventions to be implemented to achieve goals listed below:  Neuromuscular Re-Education (82561)  Therapeutic Activity (15563)  Therapeutic Exercise (18075)  Manual Therapy (08428)  Electrical Stimulation Unattended (41774 or )  Heat/Cold (28313)  Ultrasound/Phonophoresis (17200)    Frequency / Duration  1 times per week tapering as patient progresses for 12 weeks for an estimated total of 12 visits    Patient involved in and agreed to plan of care and goals.  Patient has been given attendance policy at time of initial evaluation.    Suggestions for next session as indicated: Progress per plan of care. Physical therapy treatment will be focused on decrease cervical pain, improve cervical AROM, flexibility, strength and function. Physical therapy treatment will include therapeutic exercises, therapeutic activities, manual therapy and modalities as needed.         Goals  Decrease cervical pain/symptoms and radiation to upper extremities to 1-2/10  Improve involved cervical flexion AROM to 30  Improve involved cervical extension AROM to 50    The above improvements in impairments to assist in obtaining goals listed below  Long Term Goals: to be met by end of plan of care  1. Neck Disability Index: Patient will complete form to reflect  an improved score to less than or equal to 2% to indicate patient reported improvement in function/disability/impairment (minimal detectable change: 21%).  2. Patient will be independent with progressed and modified home exercise program.    3. Patient will sleep at prior level of function per patient report.  4. Patient will rotate head to be able to see blind spots and behind car for safe driving.      Therapy procedure time and total treatment time can be found documented on the Time Entry flowsheet

## 2023-04-21 ENCOUNTER — RX CHANGE (OUTPATIENT)
Age: 63
End: 2023-04-21

## 2023-05-17 ENCOUNTER — APPOINTMENT (OUTPATIENT)
Dept: MRI IMAGING | Facility: IMAGING CENTER | Age: 63
End: 2023-05-17
Payer: COMMERCIAL

## 2023-05-17 ENCOUNTER — APPOINTMENT (OUTPATIENT)
Dept: CT IMAGING | Facility: IMAGING CENTER | Age: 63
End: 2023-05-17
Payer: COMMERCIAL

## 2023-05-17 ENCOUNTER — OUTPATIENT (OUTPATIENT)
Dept: OUTPATIENT SERVICES | Facility: HOSPITAL | Age: 63
LOS: 1 days | End: 2023-05-17
Payer: COMMERCIAL

## 2023-05-17 DIAGNOSIS — Z85.828 PERSONAL HISTORY OF OTHER MALIGNANT NEOPLASM OF SKIN: Chronic | ICD-10-CM

## 2023-05-17 DIAGNOSIS — Z98.890 OTHER SPECIFIED POSTPROCEDURAL STATES: Chronic | ICD-10-CM

## 2023-05-17 DIAGNOSIS — Z94.0 KIDNEY TRANSPLANT STATUS: Chronic | ICD-10-CM

## 2023-05-17 DIAGNOSIS — C44.320 SQUAMOUS CELL CARCINOMA OF SKIN OF UNSPECIFIED PARTS OF FACE: ICD-10-CM

## 2023-05-17 PROCEDURE — 70542 MRI ORBIT/FACE/NECK W/DYE: CPT | Mod: 26

## 2023-05-17 PROCEDURE — 71260 CT THORAX DX C+: CPT

## 2023-05-17 PROCEDURE — 70542 MRI ORBIT/FACE/NECK W/DYE: CPT

## 2023-05-17 PROCEDURE — 71260 CT THORAX DX C+: CPT | Mod: 26

## 2023-05-17 PROCEDURE — A9585: CPT

## 2023-05-24 ENCOUNTER — APPOINTMENT (OUTPATIENT)
Dept: SURGICAL ONCOLOGY | Facility: CLINIC | Age: 63
End: 2023-05-24
Payer: COMMERCIAL

## 2023-05-24 VITALS
HEIGHT: 71 IN | OXYGEN SATURATION: 98 % | HEART RATE: 96 BPM | DIASTOLIC BLOOD PRESSURE: 80 MMHG | SYSTOLIC BLOOD PRESSURE: 129 MMHG | RESPIRATION RATE: 16 BRPM | BODY MASS INDEX: 28 KG/M2 | WEIGHT: 200 LBS

## 2023-05-24 PROCEDURE — 99214 OFFICE O/P EST MOD 30 MIN: CPT

## 2023-05-24 NOTE — ADDENDUM
[FreeTextEntry1] : I, Myra Bob, acted solely as a scribe for Dr. Rakan Madden on this date 05/24/2023.

## 2023-05-24 NOTE — ASSESSMENT
[FreeTextEntry1] : Status post resection left forehead recurrent squamous cell carcinoma- margins negative on final pathology\par S/p radiation therapy as per Dr. Bragg \par Post treatment changes orbit MRI May 2023\par Continue follow up with Dr. Bragg\par RTO 4 months \par \par \par \par \par \par \par \par \par \par \par \par \par \par

## 2023-05-24 NOTE — PHYSICAL EXAM
[Normal] : supple, no neck mass and thyroid not enlarged [Normal Neck Lymph Nodes] : normal neck lymph nodes  [Normal Supraclavicular Lymph Nodes] : normal supraclavicular lymph nodes [Normal Groin Lymph Nodes] : normal groin lymph nodes [Normal Axillary Lymph Nodes] : normal axillary lymph nodes [Normal] : oriented to person, place and time, with appropriate affect [de-identified] : Left temple reconstruction well-healed, no evidence of recurrence,  post radiation changes and edema.

## 2023-05-24 NOTE — CONSULT LETTER
[Dear  ___] : Dear  [unfilled], [Please see my note below.] : Please see my note below. [Courtesy Letter:] : I had the pleasure of seeing your patient, [unfilled], in my office today. [Sincerely,] : Sincerely, [DrNae  ___] : Dr. CHAPMAN [DrNae ___] : Dr. CHAPMAN [FreeTextEntry3] : Rakan Madden MD FACS

## 2023-05-24 NOTE — HISTORY OF PRESENT ILLNESS
[de-identified] : Patient is a 63 y/o male who presents a follow up visit. History significant for CKD w/ renal transplant. 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 \par He completed radiation therapy in September 2022 as per Dr. Bragg and is currently on Tacrolimus. \par \par MRI orbit/face (5/17/23): Posttreatment changes are seen in the left periorbital region. Decreased soft tissue thickening and enhancement in the left inferior periorbital region, which could represent evolving posttreatment changes. No new/enlarging masses identified. Grossly stable left superior extraconal orbit signal abnormality, which could represent posttreatment changes, but underlying perineural invasion cannot be ruled out.\par \par \par Surgical Pathology (7/12/22):\par 1. left supraorbital tumor at supraorbital nerve, biopsy - Fibroadipose tissue with minute focus of invasive squamous cell carcinoma. \par 2. left supraorbital mass lateral margin, biopsy - Fibroadipose and nerve tissue with no malignancy identified. \par 3. left supraorbital mass, medial margin, biopsy - Fibroadipose tissue and skeletal muscle with no malignancy identified. \par 4. left supraorbital tumor, excision - Invasive squamous cell carcinoma, well differentiated, 1.0 cm in greatest dimension. \par - Perineural invasion is present.\par  - No lymphovascular invasion identified. \par - The resection margins appear to be negative for carcinoma. The tumor is very close (less than 0.1 mm) to the nearest margin (posterior). \par \par He was seen by Dr. Tom Sanchez of medical oncology where no further treatment is indicated and MRI orbit was performed on 4/14/22  which showed no increasing soft tissue thickening or new nodularity in the region of the left eyebrow to suggest tumor recurrence.\par \par Surgical Pathology (1/4/22):\par 1. Skin, left temple, wide excision\par - Squamous cell carcinoma, well to moderately differentiated, 0.7 cm in greatest dimension.\par - No lymphovascular or perineural invasion identified.\par - The tumor is 0.2 cm from the nearest peripheral margin (inferior). The tumor is focally at or very close to the deep margin (obscured by cautery artifact).\par - Biopsy site changes.\par 2. Skin, left temple, deep margin, biopsy- Benign fibrous tissue with biopsy site changes and cautery artifact.\par \par He was diagnosed with a new squamous cell carcinoma at the edge of his left temple skin graft from a prior resection on biopsy by Dr. Morgan of plastic surgery.\par \par S/p squamous cell carcinoma of left eyebrow excision 8/3/21. \par He was seen by Dr. Espino who performed Mohs surgery  but stopped the procedure due to a persistently positive deep margin.  The pt. has a hx or a renal transplant on immunosuppression and had a prior SCC resected from the left zygomatic region with adjuvant radiation tx.\par \par Pathology (8/13/21): \par Left mid eyebrow ([id number], part1, 1 slide), shave biopsy: \par - Squamous cell carcinoma, invasive, keratoacanthoma type, extending to base of biopsy. \par \par Pathology (8/3/21):\par 1. Skin, left eyebrow, excision \par - Minute residual foci of invasive squamous cell carcinoma, moderately differentiated. \par - Marked ulceration, inflammatory changes and necroinflammatory debris. \par - No lymphovascular invasion or perineural invasion identified. \par - The resection margins are negative for carcinoma. The tumor is 0.2 cm from the nearest margin (lateral). \par 2. Skin, left eyebrow, additional deep margin, biopsy \par - Fibroadipose tissue negative for carcinoma. \par 3. Skin, left eyebrow, additional superior margin, biopsy \par - Skin with focal perineural invasion within the reticular dermis (see comment). \par - No other invasive in situ carcinoma identified. \par \par Pt. was seen by Dr. Sanchez on 10/12/2021.  Given that the patient had prior RT to the left temple, pt. is unable to have RT again. Adjuvant chemo and immunotherapy is not recommended at this time.  Pt. will undergo close observation. \par \par MRI Orbits (7/31/21): Soft tissue defect overlying the left frontal scalp, consistent with history of neoplasm resection. Soft tissue swelling and enhancement of the left periorbital, premaxillary, and left frontal extracalvarial soft tissues, likely postsurgical in nature. Underlying osseous structures and intraorbital contents unremarkable. No evidence for osseous or orbital metastases. \par \par Dermatopathology Report (7/14/21):\par Left Mid Eyebrow\par -Squamous cell carcinoma, keratoacanthoma type; transected \par \par PMHx: HTN, SCC, Renal transplant (from wife)- 2004, Allergies to Zithromax

## 2023-08-16 ENCOUNTER — APPOINTMENT (OUTPATIENT)
Dept: DERMATOLOGY | Facility: CLINIC | Age: 63
End: 2023-08-16
Payer: COMMERCIAL

## 2023-08-16 PROCEDURE — 99214 OFFICE O/P EST MOD 30 MIN: CPT

## 2023-08-16 RX ORDER — RUXOLITINIB 15 MG/G
1.5 CREAM TOPICAL
Qty: 1 | Refills: 2 | Status: ACTIVE | COMMUNITY
Start: 2023-08-16 | End: 1900-01-01

## 2023-08-28 ENCOUNTER — RX RENEWAL (OUTPATIENT)
Age: 63
End: 2023-08-28

## 2023-08-28 PROBLEM — K21.9 CHRONIC GERD: Status: ACTIVE | Noted: 2023-08-28

## 2023-09-01 ENCOUNTER — OUTPATIENT (OUTPATIENT)
Dept: OUTPATIENT SERVICES | Facility: HOSPITAL | Age: 63
LOS: 1 days | End: 2023-09-01
Payer: COMMERCIAL

## 2023-09-01 ENCOUNTER — APPOINTMENT (OUTPATIENT)
Dept: MRI IMAGING | Facility: CLINIC | Age: 63
End: 2023-09-01
Payer: COMMERCIAL

## 2023-09-01 DIAGNOSIS — Z85.828 PERSONAL HISTORY OF OTHER MALIGNANT NEOPLASM OF SKIN: Chronic | ICD-10-CM

## 2023-09-01 DIAGNOSIS — Z98.890 OTHER SPECIFIED POSTPROCEDURAL STATES: Chronic | ICD-10-CM

## 2023-09-01 DIAGNOSIS — C44.320 SQUAMOUS CELL CARCINOMA OF SKIN OF UNSPECIFIED PARTS OF FACE: ICD-10-CM

## 2023-09-01 DIAGNOSIS — Z94.0 KIDNEY TRANSPLANT STATUS: Chronic | ICD-10-CM

## 2023-09-01 PROCEDURE — 70542 MRI ORBIT/FACE/NECK W/DYE: CPT

## 2023-09-01 PROCEDURE — A9585: CPT

## 2023-09-01 PROCEDURE — 70542 MRI ORBIT/FACE/NECK W/DYE: CPT | Mod: 26

## 2023-09-05 ENCOUNTER — APPOINTMENT (OUTPATIENT)
Dept: SURGICAL ONCOLOGY | Facility: CLINIC | Age: 63
End: 2023-09-05
Payer: COMMERCIAL

## 2023-09-05 VITALS
DIASTOLIC BLOOD PRESSURE: 78 MMHG | BODY MASS INDEX: 28.98 KG/M2 | HEART RATE: 95 BPM | OXYGEN SATURATION: 99 % | RESPIRATION RATE: 17 BRPM | WEIGHT: 207 LBS | HEIGHT: 71 IN | SYSTOLIC BLOOD PRESSURE: 121 MMHG

## 2023-09-05 PROCEDURE — 99213 OFFICE O/P EST LOW 20 MIN: CPT

## 2023-09-05 NOTE — PHYSICAL EXAM
[Normal] : supple, no neck mass and thyroid not enlarged [Normal Neck Lymph Nodes] : normal neck lymph nodes  [Normal Supraclavicular Lymph Nodes] : normal supraclavicular lymph nodes [Normal] : oriented to person, place and time, with appropriate affect [de-identified] : Left temple reconstruction well-healed, no evidence of recurrence,  post radiation changes and edema.

## 2023-09-05 NOTE — HISTORY OF PRESENT ILLNESS
[de-identified] : Patient is a 63 y/o male who presents a follow up visit. 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 Status post surgical resection of left eyebrow recurrent squamous cell carcinoma with plastic surgery reconstruction on 7/12/22. He completed radiation therapy in September 2022 as per Dr. Bragg and is currently on Tacrolimus. He consulted with Dr. Tom Sanchez from medical oncology- he discussed that given that margins were close but negative, NCCN does not recommend addition of chemotherapy or biologic and planned to obtain Signatera testing for continued surveillance over time. **Signatera 11/2022- NEGATIVE  Feels well, denies any new or changing skin lesions, vision changes or constitutional symptoms.   MRI orbit/face (9/1/23, per Dr. Bragg): postop/posttreatment changes, no evidence of recurrent disease.  Full body skin check with derm on 8/16/23, no lesions concerning for malignancy.   MRI orbit/face (5/17/23, per Dr. Bragg): Posttreatment changes are seen in the left periorbital region. Decreased soft tissue thickening and enhancement in the left inferior periorbital region, which could represent evolving posttreatment changes. No new/enlarging masses identified. Grossly stable left superior extraconal orbit signal abnormality, which could represent posttreatment changes, but underlying perineural invasion cannot be ruled out.  Chest CT 5/17/23 (per Dr. Bragg)- New bilateral lower lobe and lingular ill-defined opacities are of uncertain etiology. A follow-up is suggested in 4 weeks to evaluate for resolution/change.  Since 11/21/2022, the groundglass opacities have resolved.  Surgical Pathology (7/12/22):  1. left supraorbital tumor at supraorbital nerve, biopsy - Fibroadipose tissue with minute focus of invasive squamous cell carcinoma.  2. left supraorbital mass lateral margin, biopsy - Fibroadipose and nerve tissue with no malignancy identified.  3. left supraorbital mass, medial margin, biopsy - Fibroadipose tissue and skeletal muscle with no malignancy identified.  4. left supraorbital tumor, excision - Invasive squamous cell carcinoma, well differentiated, 1.0 cm in greatest dimension.  - Perineural invasion is present.   - No lymphovascular invasion identified.  - The resection margins appear to be negative for carcinoma. The tumor is very close (less than 0.1 mm) to the nearest margin (posterior).    He was seen by Dr. Tom Sanchez of medical oncology where no further treatment is indicated and MRI orbit was performed on 4/14/22 which showed no increasing soft tissue thickening or new nodularity in the region of the left eyebrow to suggest tumor recurrence.    Surgical Pathology (1/4/22):  1. Skin, left temple, wide excision  - Squamous cell carcinoma, well to moderately differentiated, 0.7 cm in greatest dimension.  - No lymphovascular or perineural invasion identified.  - The tumor is 0.2 cm from the nearest peripheral margin (inferior). The tumor is focally at or very close to the deep margin (obscured by cautery artifact).  - Biopsy site changes.  2. Skin, left temple, deep margin, biopsy- Benign fibrous tissue with biopsy site changes and cautery artifact.    He was diagnosed with a new squamous cell carcinoma at the edge of his left temple skin graft from a prior resection on biopsy by Dr. Morgan of plastic surgery.    S/p squamous cell carcinoma of left eyebrow excision 8/3/21.  He was seen by Dr. Espino who performed Mohs surgery but stopped the procedure due to a persistently positive deep margin. The pt. has a hx or a renal transplant on immunosuppression and had a prior SCC resected from the left zygomatic region with adjuvant radiation tx.    Pathology (8/13/21):  Left mid eyebrow ([id number], part1, 1 slide), shave biopsy:  - Squamous cell carcinoma, invasive, keratoacanthoma type, extending to base of biopsy.    Pathology (8/3/21):  1. Skin, left eyebrow, excision  - Minute residual foci of invasive squamous cell carcinoma, moderately differentiated.  - Marked ulceration, inflammatory changes and necroinflammatory debris.  - No lymphovascular invasion or perineural invasion identified.  - The resection margins are negative for carcinoma. The tumor is 0.2 cm from the nearest margin (lateral).  2. Skin, left eyebrow, additional deep margin, biopsy  - Fibroadipose tissue negative for carcinoma.  3. Skin, left eyebrow, additional superior margin, biopsy  - Skin with focal perineural invasion within the reticular dermis (see comment).  - No other invasive in situ carcinoma identified.    Pt. was seen by Dr. Sanchez on 10/12/2021. Given that the patient had prior RT to the left temple, pt. is unable to have RT again. Adjuvant chemo and immunotherapy is not recommended at this time. Pt. will undergo close observation.    MRI Orbits (7/31/21): Soft tissue defect overlying the left frontal scalp, consistent with history of neoplasm resection. Soft tissue swelling and enhancement of the left periorbital, premaxillary, and left frontal extracalvarial soft tissues, likely postsurgical in nature. Underlying osseous structures and intraorbital contents unremarkable. No evidence for osseous or orbital metastases.    Dermatopathology Report (7/14/21):  Left Mid Eyebrow  -Squamous cell carcinoma, keratoacanthoma type; transected    PMHx: HTN, SCC, Renal transplant (from wife)- 2004, Allergies to Zithromax.

## 2023-09-05 NOTE — ASSESSMENT
[FreeTextEntry1] : Status post resection left forehead recurrent squamous cell carcinoma- margins negative on final pathology S/p radiation therapy as per Dr. Bragg Post treatment changes orbit MRI September 2023 Continue follow up with Dr. Bragg (9/14/23), Dr. Sanchez (appointment pending and will be due for Signatera) Imaging q 4 months per Dr. Bragg RTO 4 months

## 2023-09-14 ENCOUNTER — APPOINTMENT (OUTPATIENT)
Dept: RADIATION ONCOLOGY | Facility: CLINIC | Age: 63
End: 2023-09-14
Payer: COMMERCIAL

## 2023-09-14 VITALS
DIASTOLIC BLOOD PRESSURE: 86 MMHG | HEART RATE: 77 BPM | RESPIRATION RATE: 17 BRPM | TEMPERATURE: 95.9 F | BODY MASS INDEX: 28.9 KG/M2 | WEIGHT: 206.46 LBS | HEIGHT: 71 IN | OXYGEN SATURATION: 100 % | SYSTOLIC BLOOD PRESSURE: 128 MMHG

## 2023-09-14 PROCEDURE — 99213 OFFICE O/P EST LOW 20 MIN: CPT

## 2023-10-12 ENCOUNTER — OUTPATIENT (OUTPATIENT)
Dept: OUTPATIENT SERVICES | Facility: HOSPITAL | Age: 63
LOS: 1 days | Discharge: HOME | End: 2023-10-12

## 2023-10-12 DIAGNOSIS — Z98.890 OTHER SPECIFIED POSTPROCEDURAL STATES: Chronic | ICD-10-CM

## 2023-10-12 DIAGNOSIS — C44.329 SQUAMOUS CELL CARCINOMA OF SKIN OF OTHER PARTS OF FACE: ICD-10-CM

## 2023-10-12 DIAGNOSIS — Z85.828 PERSONAL HISTORY OF OTHER MALIGNANT NEOPLASM OF SKIN: Chronic | ICD-10-CM

## 2023-10-12 DIAGNOSIS — Z94.0 KIDNEY TRANSPLANT STATUS: Chronic | ICD-10-CM

## 2023-10-13 ENCOUNTER — APPOINTMENT (OUTPATIENT)
Dept: HEMATOLOGY ONCOLOGY | Facility: CLINIC | Age: 63
End: 2023-10-13
Payer: COMMERCIAL

## 2023-10-13 VITALS
SYSTOLIC BLOOD PRESSURE: 119 MMHG | DIASTOLIC BLOOD PRESSURE: 74 MMHG | TEMPERATURE: 97.1 F | OXYGEN SATURATION: 97 % | RESPIRATION RATE: 19 BRPM | BODY MASS INDEX: 28.6 KG/M2 | WEIGHT: 205.03 LBS | HEART RATE: 96 BPM

## 2023-10-13 DIAGNOSIS — C44.92 SQUAMOUS CELL CARCINOMA OF SKIN, UNSPECIFIED: ICD-10-CM

## 2023-10-13 PROCEDURE — 99213 OFFICE O/P EST LOW 20 MIN: CPT

## 2023-11-14 ENCOUNTER — APPOINTMENT (OUTPATIENT)
Dept: DERMATOLOGY | Facility: CLINIC | Age: 63
End: 2023-11-14
Payer: COMMERCIAL

## 2023-11-14 DIAGNOSIS — D48.5 NEOPLASM OF UNCERTAIN BEHAVIOR OF SKIN: ICD-10-CM

## 2023-11-14 DIAGNOSIS — R21 RASH AND OTHER NONSPECIFIC SKIN ERUPTION: ICD-10-CM

## 2023-11-14 PROCEDURE — 11104 PUNCH BX SKIN SINGLE LESION: CPT | Mod: 59

## 2023-11-14 PROCEDURE — 11105 PUNCH BX SKIN EA SEP/ADDL: CPT | Mod: 59

## 2023-11-14 PROCEDURE — 17000 DESTRUCT PREMALG LESION: CPT | Mod: 59

## 2023-11-14 PROCEDURE — 99214 OFFICE O/P EST MOD 30 MIN: CPT | Mod: 25

## 2023-11-22 ENCOUNTER — NON-APPOINTMENT (OUTPATIENT)
Age: 63
End: 2023-11-22

## 2023-11-22 LAB
ALBUMIN SERPL ELPH-MCNC: 4.5 G/DL
ALP BLD-CCNC: 74 U/L
ALT SERPL-CCNC: 24 U/L
ANION GAP SERPL CALC-SCNC: 13 MMOL/L
AST SERPL-CCNC: 28 U/L
BASOPHILS # BLD AUTO: 0.07 K/UL
BASOPHILS NFR BLD AUTO: 1 %
BILIRUB SERPL-MCNC: 0.5 MG/DL
BMZ BP180 IGG SERPL-ACNC: <2 RU/ML
BMZ BP230 IGG SERPL-ACNC: <2 RU/ML
BUN SERPL-MCNC: 45 MG/DL
CALCIUM SERPL-MCNC: 10.2 MG/DL
CHLORIDE SERPL-SCNC: 107 MMOL/L
CO2 SERPL-SCNC: 20 MMOL/L
CREAT SERPL-MCNC: 2.68 MG/DL
DEPRECATED KAPPA LC FREE/LAMBDA SER: 1.46 RATIO
DERMATOLOGY BIOPSY: NORMAL
EGFR: 26 ML/MIN/1.73M2
EOSINOPHIL # BLD AUTO: 0.61 K/UL
EOSINOPHIL NFR BLD AUTO: 8.7 %
GLUCOSE SERPL-MCNC: 163 MG/DL
HCT VFR BLD CALC: 36.4 %
HGB BLD-MCNC: 11.7 G/DL
IGA SER QL IEP: 472 MG/DL
IGE SER-MCNC: 11 KU/L
IGG SER QL IEP: 841 MG/DL
IGM SER QL IEP: 32 MG/DL
IMM GRANULOCYTES NFR BLD AUTO: 0.6 %
KAPPA LC CSF-MCNC: 3.23 MG/DL
KAPPA LC SERPL-MCNC: 4.72 MG/DL
LYMPHOCYTES # BLD AUTO: 1.19 K/UL
LYMPHOCYTES NFR BLD AUTO: 17 %
MAN DIFF?: NORMAL
MCHC RBC-ENTMCNC: 29 PG
MCHC RBC-ENTMCNC: 32.1 GM/DL
MCV RBC AUTO: 90.1 FL
MONOCYTES # BLD AUTO: 0.95 K/UL
MONOCYTES NFR BLD AUTO: 13.6 %
NEUTROPHILS # BLD AUTO: 4.15 K/UL
NEUTROPHILS NFR BLD AUTO: 59.1 %
PLATELET # BLD AUTO: 267 K/UL
POTASSIUM SERPL-SCNC: 5.5 MMOL/L
PROT SERPL-MCNC: 7.2 G/DL
RBC # BLD: 4.04 M/UL
RBC # FLD: 14.9 %
SODIUM SERPL-SCNC: 139 MMOL/L
TSH SERPL-ACNC: 0.41 UIU/ML
WBC # FLD AUTO: 7.01 K/UL

## 2023-11-27 ENCOUNTER — APPOINTMENT (OUTPATIENT)
Dept: DERMATOLOGY | Facility: CLINIC | Age: 63
End: 2023-11-27
Payer: COMMERCIAL

## 2023-11-27 DIAGNOSIS — Z85.89 PERSONAL HISTORY OF MALIGNANT NEOPLASM OF OTHER ORGANS AND SYSTEMS: ICD-10-CM

## 2023-11-27 DIAGNOSIS — L20.9 ATOPIC DERMATITIS, UNSPECIFIED: ICD-10-CM

## 2023-11-27 PROCEDURE — 99214 OFFICE O/P EST MOD 30 MIN: CPT

## 2023-11-27 RX ORDER — MUPIROCIN 20 MG/G
2 OINTMENT TOPICAL
Qty: 1 | Refills: 0 | Status: ACTIVE | COMMUNITY
Start: 2023-11-27 | End: 1900-01-01

## 2023-11-27 RX ORDER — CLOBETASOL PROPIONATE 0.5 MG/G
0.05 OINTMENT TOPICAL
Qty: 1 | Refills: 2 | Status: ACTIVE | COMMUNITY
Start: 2023-11-27 | End: 1900-01-01

## 2023-11-27 RX ORDER — TRIAMCINOLONE ACETONIDE 1 MG/G
0.1 OINTMENT TOPICAL
Qty: 1 | Refills: 0 | Status: ACTIVE | COMMUNITY
Start: 2023-11-27 | End: 1900-01-01

## 2023-11-29 ENCOUNTER — NON-APPOINTMENT (OUTPATIENT)
Age: 63
End: 2023-11-29

## 2023-11-30 ENCOUNTER — TRANSCRIPTION ENCOUNTER (OUTPATIENT)
Age: 63
End: 2023-11-30

## 2023-12-01 ENCOUNTER — OUTPATIENT (OUTPATIENT)
Dept: OUTPATIENT SERVICES | Facility: HOSPITAL | Age: 63
LOS: 1 days | End: 2023-12-01

## 2023-12-01 ENCOUNTER — APPOINTMENT (OUTPATIENT)
Dept: INTERNAL MEDICINE | Facility: CLINIC | Age: 63
End: 2023-12-01

## 2023-12-01 ENCOUNTER — NON-APPOINTMENT (OUTPATIENT)
Age: 63
End: 2023-12-01

## 2023-12-01 DIAGNOSIS — Z85.828 PERSONAL HISTORY OF OTHER MALIGNANT NEOPLASM OF SKIN: Chronic | ICD-10-CM

## 2023-12-05 ENCOUNTER — NON-APPOINTMENT (OUTPATIENT)
Age: 63
End: 2023-12-05

## 2023-12-05 ENCOUNTER — APPOINTMENT (OUTPATIENT)
Dept: DERMATOLOGY | Facility: CLINIC | Age: 63
End: 2023-12-05
Payer: COMMERCIAL

## 2023-12-05 DIAGNOSIS — M19.90 UNSPECIFIED OSTEOARTHRITIS, UNSPECIFIED SITE: ICD-10-CM

## 2023-12-05 DIAGNOSIS — M25.822 OTHER SPECIFIED JOINT DISORDERS, LEFT ELBOW: ICD-10-CM

## 2023-12-05 PROCEDURE — 99213 OFFICE O/P EST LOW 20 MIN: CPT

## 2023-12-08 ENCOUNTER — APPOINTMENT (OUTPATIENT)
Dept: FAMILY MEDICINE | Facility: CLINIC | Age: 63
End: 2023-12-08
Payer: COMMERCIAL

## 2023-12-08 VITALS
OXYGEN SATURATION: 98 % | RESPIRATION RATE: 15 BRPM | WEIGHT: 200 LBS | TEMPERATURE: 98.4 F | SYSTOLIC BLOOD PRESSURE: 135 MMHG | DIASTOLIC BLOOD PRESSURE: 85 MMHG | BODY MASS INDEX: 27.89 KG/M2 | HEART RATE: 95 BPM

## 2023-12-08 PROCEDURE — 99214 OFFICE O/P EST MOD 30 MIN: CPT

## 2023-12-08 RX ORDER — DUPILUMAB 300 MG/2ML
300 INJECTION, SOLUTION SUBCUTANEOUS
Qty: 4 | Refills: 0 | Status: ACTIVE | COMMUNITY
Start: 2023-11-30

## 2023-12-12 ENCOUNTER — RESULT REVIEW (OUTPATIENT)
Age: 63
End: 2023-12-12

## 2023-12-12 ENCOUNTER — APPOINTMENT (OUTPATIENT)
Dept: DERMATOLOGY | Facility: CLINIC | Age: 63
End: 2023-12-12
Payer: COMMERCIAL

## 2023-12-12 ENCOUNTER — OUTPATIENT (OUTPATIENT)
Dept: OUTPATIENT SERVICES | Facility: HOSPITAL | Age: 63
LOS: 1 days | End: 2023-12-12
Payer: COMMERCIAL

## 2023-12-12 ENCOUNTER — APPOINTMENT (OUTPATIENT)
Dept: RADIOLOGY | Facility: IMAGING CENTER | Age: 63
End: 2023-12-12
Payer: COMMERCIAL

## 2023-12-12 DIAGNOSIS — Z85.828 PERSONAL HISTORY OF OTHER MALIGNANT NEOPLASM OF SKIN: Chronic | ICD-10-CM

## 2023-12-12 DIAGNOSIS — Z98.890 OTHER SPECIFIED POSTPROCEDURAL STATES: Chronic | ICD-10-CM

## 2023-12-12 DIAGNOSIS — L03.90 CELLULITIS, UNSPECIFIED: ICD-10-CM

## 2023-12-12 DIAGNOSIS — Z94.0 KIDNEY TRANSPLANT STATUS: Chronic | ICD-10-CM

## 2023-12-12 PROCEDURE — 73070 X-RAY EXAM OF ELBOW: CPT

## 2023-12-12 PROCEDURE — 73070 X-RAY EXAM OF ELBOW: CPT | Mod: 26,LT

## 2023-12-12 PROCEDURE — 99214 OFFICE O/P EST MOD 30 MIN: CPT | Mod: 25

## 2023-12-12 PROCEDURE — 96401 CHEMO ANTI-NEOPL SQ/IM: CPT

## 2023-12-31 ENCOUNTER — TRANSCRIPTION ENCOUNTER (OUTPATIENT)
Age: 63
End: 2023-12-31

## 2023-12-31 ENCOUNTER — NON-APPOINTMENT (OUTPATIENT)
Age: 63
End: 2023-12-31

## 2024-01-09 ENCOUNTER — APPOINTMENT (OUTPATIENT)
Dept: FAMILY MEDICINE | Facility: CLINIC | Age: 64
End: 2024-01-09
Payer: COMMERCIAL

## 2024-01-09 VITALS
DIASTOLIC BLOOD PRESSURE: 76 MMHG | BODY MASS INDEX: 28.14 KG/M2 | WEIGHT: 201 LBS | TEMPERATURE: 97.5 F | OXYGEN SATURATION: 97 % | SYSTOLIC BLOOD PRESSURE: 144 MMHG | HEIGHT: 71 IN | HEART RATE: 94 BPM | RESPIRATION RATE: 15 BRPM

## 2024-01-09 DIAGNOSIS — R73.01 IMPAIRED FASTING GLUCOSE: ICD-10-CM

## 2024-01-09 DIAGNOSIS — Z00.00 ENCOUNTER FOR GENERAL ADULT MEDICAL EXAMINATION W/OUT ABNORMAL FINDINGS: ICD-10-CM

## 2024-01-09 DIAGNOSIS — C44.320 SQUAMOUS CELL CARCINOMA OF SKIN OF UNSPECIFIED PARTS OF FACE: ICD-10-CM

## 2024-01-09 DIAGNOSIS — E55.9 VITAMIN D DEFICIENCY, UNSPECIFIED: ICD-10-CM

## 2024-01-09 DIAGNOSIS — E78.5 HYPERLIPIDEMIA, UNSPECIFIED: ICD-10-CM

## 2024-01-09 DIAGNOSIS — I10 ESSENTIAL (PRIMARY) HYPERTENSION: ICD-10-CM

## 2024-01-09 DIAGNOSIS — Z87.2 PERSONAL HISTORY OF DISEASES OF THE SKIN AND SUBCUTANEOUS TISSUE: ICD-10-CM

## 2024-01-09 DIAGNOSIS — M25.551 PAIN IN RIGHT HIP: ICD-10-CM

## 2024-01-09 DIAGNOSIS — N40.0 BENIGN PROSTATIC HYPERPLASIA WITHOUT LOWER URINARY TRACT SYMPMS: ICD-10-CM

## 2024-01-09 PROCEDURE — 99396 PREV VISIT EST AGE 40-64: CPT

## 2024-01-17 ENCOUNTER — OUTPATIENT (OUTPATIENT)
Dept: OUTPATIENT SERVICES | Facility: HOSPITAL | Age: 64
LOS: 1 days | End: 2024-01-17
Payer: COMMERCIAL

## 2024-01-17 ENCOUNTER — APPOINTMENT (OUTPATIENT)
Dept: RADIOLOGY | Facility: CLINIC | Age: 64
End: 2024-01-17
Payer: COMMERCIAL

## 2024-01-17 DIAGNOSIS — M25.551 PAIN IN RIGHT HIP: ICD-10-CM

## 2024-01-17 DIAGNOSIS — Z98.890 OTHER SPECIFIED POSTPROCEDURAL STATES: Chronic | ICD-10-CM

## 2024-01-17 DIAGNOSIS — Z85.828 PERSONAL HISTORY OF OTHER MALIGNANT NEOPLASM OF SKIN: Chronic | ICD-10-CM

## 2024-01-17 DIAGNOSIS — Z94.0 KIDNEY TRANSPLANT STATUS: Chronic | ICD-10-CM

## 2024-01-17 PROCEDURE — 73502 X-RAY EXAM HIP UNI 2-3 VIEWS: CPT | Mod: 26,RT

## 2024-01-17 PROCEDURE — 73502 X-RAY EXAM HIP UNI 2-3 VIEWS: CPT

## 2024-01-18 PROBLEM — C44.320 SQUAMOUS CELL CARCINOMA OF FACE: Status: ACTIVE | Noted: 2022-05-04

## 2024-01-18 NOTE — PHYSICAL EXAM
[No Acute Distress] : no acute distress [Well Nourished] : well nourished [Well Developed] : well developed [Normal Sclera/Conjunctiva] : normal sclera/conjunctiva [PERRL] : pupils equal round and reactive to light [Normal Outer Ear/Nose] : the outer ears and nose were normal in appearance [Normal Oropharynx] : the oropharynx was normal [Normal TMs] : both tympanic membranes were normal [No JVD] : no jugular venous distention [Supple] : supple [Thyroid Normal, No Nodules] : the thyroid was normal and there were no nodules present [No Respiratory Distress] : no respiratory distress  [No Accessory Muscle Use] : no accessory muscle use [Clear to Auscultation] : lungs were clear to auscultation bilaterally [Normal Rate] : normal rate  [Regular Rhythm] : with a regular rhythm [Normal S1, S2] : normal S1 and S2 [No Murmur] : no murmur heard [Pedal Pulses Present] : the pedal pulses are present [No Edema] : there was no peripheral edema [No Extremity Clubbing/Cyanosis] : no extremity clubbing/cyanosis [Normal Appearance] : normal in appearance [Soft] : abdomen soft [Non Tender] : non-tender [Non-distended] : non-distended [No Masses] : no abdominal mass palpated [No HSM] : no HSM [Declined Rectal Exam] : declined rectal exam [No CVA Tenderness] : no CVA  tenderness [No Joint Swelling] : no joint swelling [No Focal Deficits] : no focal deficits [Normal Gait] : normal gait [Normal Affect] : the affect was normal [Alert and Oriented x3] : oriented to person, place, and time [Normal Insight/Judgement] : insight and judgment were intact [EOMI] : extraocular movements intact [Fundoscopic Exam Performed] : fundoscopic ~T exam ~C was performed [Normal Supraclavicular Nodes] : no supraclavicular lymphadenopathy [Normal Posterior Cervical Nodes] : no posterior cervical lymphadenopathy [Normal Anterior Cervical Nodes] : no anterior cervical lymphadenopathy [de-identified] : obese [de-identified] : scar from renal transplant [FreeTextEntry1] : defer to urology [de-identified] : defer to urology [de-identified] : scar secondary to renal transplant [de-identified] : right hip pain  [de-identified] : scarring at the lateral aspect of the left eyebrow from recent excision and RT of squamous cell carcinoma with bx proven recurrence, subsequent skin graft

## 2024-01-18 NOTE — REVIEW OF SYSTEMS
[Nocturia] : nocturia [Back Pain] : back pain [Negative] : Heme/Lymph [Dysuria] : no dysuria [Incontinence] : no incontinence [Hesitancy] : no hesitancy [Hematuria] : no hematuria [Frequency] : no frequency [Impotence] : no impotency [FreeTextEntry8] : s/p renal transplant on immunosuppressants, BPH sx [FreeTextEntry9] : mild rare low back stiffness on occasion, right hip pain over the last month or more, no associated complaints or any trauma noted [de-identified] : scar of the left eyebrow with no gross lesion at this time but recurrence seen on scans lateral aspect of the scar- bx proven recurrent squamous carcinoma

## 2024-01-18 NOTE — HEALTH RISK ASSESSMENT
[Good] : ~his/her~ current health as good [Very Good] : ~his/her~  mood as very good [Never] : Never [FreeTextEntry1] : recent bout with skin cancer and worse due to being immunosuprressed from renal trnasplant medications [de-identified] : derm, rad onc, pulmonary- notes reviewed [HIV test declined] : HIV test declined [Change in mental status noted] : No change in mental status noted [None] : None [With Family] : lives with family [# of Members in Household ___] :  household currently consist of [unfilled] member(s) [Employed] : employed [] :  [# Of Children ___] : has [unfilled] children [Sexually Active] : sexually active [High Risk Behavior] : no high risk behavior [Feels Safe at Home] : Feels safe at home [Fully functional (bathing, dressing, toileting, transferring, walking, feeding)] : Fully functional (bathing, dressing, toileting, transferring, walking, feeding) [Fully functional (using the telephone, shopping, preparing meals, housekeeping, doing laundry, using] : Fully functional and needs no help or supervision to perform IADLs (using the telephone, shopping, preparing meals, housekeeping, doing laundry, using transportation, managing medications and managing finances) [Reports changes in hearing] : Reports no changes in hearing [Reports changes in vision] : Reports no changes in vision [Reports normal functional visual acuity (ie: able to read med bottle)] : Reports normal functional visual acuity [Reports changes in dental health] : Reports no changes in dental health [Smoke Detector] : smoke detector [Carbon Monoxide Detector] : carbon monoxide detector [Safety elements used in home] : safety elements used in home [Seat Belt] :  uses seat belt [Sunscreen] : uses sunscreen [de-identified] : none [FreeTextEntry2] : [employer]  [de-identified] : single female spouse [de-identified] : had vision exams associated with his radiation to the area near his left eye for his skin cancer [Patient/Caregiver not ready to engage] : , patient/caregiver not ready to engage [AdvancecareDate] : 1/24 [FreeTextEntry4] : likely proxy is his wife Jessi Linares but he will take the paperwork and discuss with her

## 2024-01-18 NOTE — CURRENT MEDS
[Takes medication as prescribed] : takes [None] : Patient does not have any barriers to medication adherence [Yes] : Reviewed medication list for presence of high-risk medications. [FreeTextEntry1] : immunosuppressive therapy for renal transplant

## 2024-01-18 NOTE — ASSESSMENT
[FreeTextEntry1] : discussed preventive screening inlcuding PSA, colon cancer which is up to date and also all immunizations- will have shingrix in spring, considering RSV and advised RSV and had covid and flu in immunocompromised state secondary to his transplant medications

## 2024-01-19 ENCOUNTER — RESULT REVIEW (OUTPATIENT)
Age: 64
End: 2024-01-19

## 2024-02-01 ENCOUNTER — APPOINTMENT (OUTPATIENT)
Dept: ULTRASOUND IMAGING | Facility: CLINIC | Age: 64
End: 2024-02-01
Payer: COMMERCIAL

## 2024-02-01 ENCOUNTER — OUTPATIENT (OUTPATIENT)
Dept: OUTPATIENT SERVICES | Facility: HOSPITAL | Age: 64
LOS: 1 days | End: 2024-02-01
Payer: COMMERCIAL

## 2024-02-01 DIAGNOSIS — Z85.828 PERSONAL HISTORY OF OTHER MALIGNANT NEOPLASM OF SKIN: Chronic | ICD-10-CM

## 2024-02-01 DIAGNOSIS — C44.320 SQUAMOUS CELL CARCINOMA OF SKIN OF UNSPECIFIED PARTS OF FACE: ICD-10-CM

## 2024-02-01 DIAGNOSIS — Z98.890 OTHER SPECIFIED POSTPROCEDURAL STATES: Chronic | ICD-10-CM

## 2024-02-01 DIAGNOSIS — Z94.0 KIDNEY TRANSPLANT STATUS: Chronic | ICD-10-CM

## 2024-02-01 PROCEDURE — 76536 US EXAM OF HEAD AND NECK: CPT

## 2024-02-01 PROCEDURE — 76536 US EXAM OF HEAD AND NECK: CPT | Mod: 26

## 2024-02-16 ENCOUNTER — RX CHANGE (OUTPATIENT)
Age: 64
End: 2024-02-16

## 2024-02-16 RX ORDER — FAMOTIDINE 40 MG/1
40 TABLET, FILM COATED ORAL
Qty: 90 | Refills: 3 | Status: ACTIVE | COMMUNITY
Start: 1900-01-01 | End: 1900-01-01

## 2024-02-20 ENCOUNTER — APPOINTMENT (OUTPATIENT)
Dept: DERMATOLOGY | Facility: CLINIC | Age: 64
End: 2024-02-20
Payer: COMMERCIAL

## 2024-02-20 PROCEDURE — 99214 OFFICE O/P EST MOD 30 MIN: CPT | Mod: 25

## 2024-02-20 PROCEDURE — 17000 DESTRUCT PREMALG LESION: CPT

## 2024-02-20 PROCEDURE — 17003 DESTRUCT PREMALG LES 2-14: CPT | Mod: 59

## 2024-02-22 NOTE — PHYSICAL EXAM
[Alert] : alert [Oriented x 3] : ~L oriented x 3 [Well Nourished] : well nourished [Conjunctiva Non-injected] : conjunctiva non-injected [No Visual Lymphadenopathy] : no visual  lymphadenopathy [No Clubbing] : no clubbing [No Edema] : no edema [No Bromhidrosis] : no bromhidrosis [No Chromhidrosis] : no chromhidrosis [FreeTextEntry3] : gritty pink papules o scalp, right preauricular

## 2024-02-22 NOTE — ASSESSMENT
[FreeTextEntry1] : # eczematous dermatitis on face and arms, some areas appear more edematous w/out scale, new onset since 6/2023, coincides with switching from cellcept-->sirolimus, suspicion for superimposed ACD Outside patch testing (?140 patches, Dr. Jones), tested positive to sodium darya sulfate, benzoic acid, copper. LABS notable for peripheral eosinophilia (abs 0.61), IgE WNL, BP abs neg  Punch biopsy for H&E and DIF done 10/2023. H&E: subacute dermatitis, DIF negative  On dupixent since 12/2023 Chronic, improving, not at tx goal - education, counseling - c/w DUPIXENT 300mg q 2 weeks.  Risks of medication again reviewed including but not limited to:  increased risk of injection site reaction, hypersensitivity reaction, conjunctivitis, keratitis, cold sores. Pt advised to avoid live vaccines while on this medication. - reiterated importance of adhering to SAFE list. Advised it make take some more time for dermatitis to resolve. patch tst results may also have been confounded by immunosuppression for organ tx - clobetasol 0.05% ointment BID to the affected area prn roughness/itch. Do not apply to face/groin. Do not use for more than 2 weeks at a time, with 1 week off in between. Side effects discussed with pt including but not limited to thinning of the skin, atrophy and dyspigmentation.  # Hx of recurrent SCC of the L temple/orbital region, s/p most recent surgery and radiation completed 9/2022 Pt with kidney transplant 18 years ago, on mycophenolate and tacrolimus - NER - no head/neck LAD  #AKs, scalp, face - The risks/benefits/alternatives of cryo-destruction was explained to the patient which, include but are not limited to redness, swelling, pain, blistering, scar, discoloration of skin, and recurrence. The patient expressed understanding of these risks and agreed to the procedure. # 6 lesions treated with 2 cycle of LN2. The procedure was well tolerated, without complication. We have discussed related skin care.

## 2024-02-22 NOTE — HISTORY OF PRESENT ILLNESS
[FreeTextEntry1] : f/u [de-identified] : 62 yo M, hx of kidney transplant 18 years ago, on tacrolimus and sirolimus, presenting for f/u.   Started on dupixent 12/2023 for eczematous rash, very happy [facility] results, minimal rash today  HISTORY: Eczematous derm under context of switching from cellcept-->sirolimus, also possible component of ACD (s/p patch testing, has been avoiding allergens). rash somewhat better but still itchy and still with rashes mostly on extremities. Using opzelura sparingly which helps.  10/2023: biopsy taken for H&E & DIF, which showed subacute dermatitis with DIF negative for any autoimmune bullous disease. Has been using vanicream and opzelura. Still very itchy with affected skin widespread. Still with itchy areas and new spots. Has lower eyelid edema, that he uses hydrocortisone for.     # history of extensive and recurrent SCC of the L temple/orbital/forehead region  - first diagnosis was in 2016, had Mohs done; was clear for about 2 years  - new growth in 2018, again an SCC within the same general region, had Mohs and radiation  - clear until 2020, when grew new lesions close to the eyebrow, went to surg onc for removal  - 2021, additional SCC within same general area that was removed by surg onc, radiation again  - most recently s/p wide excision of L temple SCC 1/2022, again resection in 7/2022 and most recent radiation completed in 9/2022

## 2024-02-22 NOTE — REASON FOR VISIT
[Routine Follow-Up] : routine follow-up visit for [Other: ___] : [unfilled] [Head and Neck Cancer] : head and neck cancer

## 2024-02-28 RX ORDER — DUPILUMAB 300 MG/2ML
300 INJECTION, SOLUTION SUBCUTANEOUS
Qty: 1 | Refills: 5 | Status: ACTIVE | COMMUNITY
Start: 2023-11-30

## 2024-02-29 ENCOUNTER — APPOINTMENT (OUTPATIENT)
Dept: RADIATION ONCOLOGY | Facility: CLINIC | Age: 64
End: 2024-02-29
Payer: COMMERCIAL

## 2024-02-29 VITALS
OXYGEN SATURATION: 100 % | HEIGHT: 71 IN | RESPIRATION RATE: 15 BRPM | HEART RATE: 81 BPM | TEMPERATURE: 97.52 F | BODY MASS INDEX: 28.58 KG/M2 | DIASTOLIC BLOOD PRESSURE: 69 MMHG | WEIGHT: 204.14 LBS | SYSTOLIC BLOOD PRESSURE: 113 MMHG

## 2024-02-29 PROCEDURE — 99213 OFFICE O/P EST LOW 20 MIN: CPT | Mod: GC

## 2024-02-29 RX ORDER — DOXYCYCLINE HYCLATE 100 MG/1
100 CAPSULE ORAL
Qty: 14 | Refills: 0 | Status: DISCONTINUED | COMMUNITY
Start: 2023-12-05 | End: 2024-02-29

## 2024-02-29 RX ORDER — SIROLIMUS 2 MG/1
2 TABLET, FILM COATED ORAL
Refills: 0 | Status: ACTIVE | COMMUNITY

## 2024-02-29 RX ORDER — MYCOPHENOLATE MOFETIL 500 MG/1
500 TABLET ORAL TWICE DAILY
Qty: 60 | Refills: 0 | Status: DISCONTINUED | COMMUNITY
End: 2024-02-29

## 2024-02-29 NOTE — VITALS
Assessment and Plan:   · Assessment		  52y Female with bleeding gastric ulcer s/p EGD w/ cauterization (6/22) and IR angiogram w/ R+L gastric artery embolization (6/22). Hemodynamically stable with stable H&H. Now s/p EGD with gastric ulcer identified and tagged, Billroth 2 operation, and distal gastrectomy on 7/1. She is recovering well and is hemodynamically stable and afebrile.     Plan:   - pain control  - PT/OOB  - Plan for UGI study today. If ok advance to gastric diet  - D/C to home when ready [Maximal Pain Intensity: 0/10] : 0/10 [Least Pain Intensity: 0/10] : 0/10 [90: Able to carry normal activity; minor signs or symptoms of disease.] : 90: Able to carry normal activity; minor signs or symptoms of disease.  [ECOG Performance Status: 0 - Fully active, able to carry on all pre-disease performance without restriction] : Performance Status: 0 - Fully active, able to carry on all pre-disease performance without restriction

## 2024-02-29 NOTE — REVIEW OF SYSTEMS
[Fatigue: Grade 0] : Fatigue: Grade 0 [Facial Muscle Weakness: Grade 0] : Facial Muscle Weakness: Grade 0 [Blurred Vision: Grade 0] : Blurred Vision: Grade 0 [Headache: Grade 0] : Headache: Grade 0 [Skin Hyperpigmentation: Grade 0] : Skin Hyperpigmentation: Grade 0 [Dermatitis Radiation: Grade 0] : Dermatitis Radiation: Grade 0

## 2024-03-07 NOTE — PHYSICAL EXAM
[Extraocular Movements] : extraocular movements were intact [Sclera] : the sclera and conjunctiva were normal [Outer Ear] : the ears and nose were normal in appearance [Hearing Threshold Finger Rub Not Sabine] : hearing was normal [] : no respiratory distress [Exaggerated Use Of Accessory Muscles For Inspiration] : no accessory muscle use [Arterial Pulses Normal] : the arterial pulses were normal [Nondistended] : nondistended [Edema] : no peripheral edema present [Abdomen Tenderness] : non-tender [Nail Clubbing] : no clubbing  or cyanosis of the fingernails [Normal] : oriented to person, place and time, the affect was normal, the mood was normal and not anxious [de-identified] : no dry eye, L eye with partial loss of closing. twitching noted.  [de-identified] : expected surgical and RT changes over the L temporal region

## 2024-03-07 NOTE — HISTORY OF PRESENT ILLNESS
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 Soft tissue swelling and enhancement of the left periorbital, premaxillary, and left frontal extracalvarial soft tissues, likely postsurgical in nature. Underlying osseous structures and intraorbital contents unremarkable. No evidence for osseous or orbital metastases.    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 Surgical Pathology  1. Skin, left temple, wide excision Squamous cell carcinoma, well to moderately differentiated, 0.7 cm in greatest dimension.No lymphovascular or perineural invasion identified.The tumor is 0.2 cm from the nearest peripheral margin (inferior). The tumor is focally at or very close to the deep margin (obscured by cautery artifact).Biopsy site changes. 2. Skin, left temple, deep margin, biopsy- Benign fibrous tissue with biopsy site changes and cautery artifact.  He was seen by Dr. Tom Sanchez of medical oncology where no further treatment is indicated.  4/14/22:  MRI orbit was performed which showed no increasing soft tissue thickening or new nodularity in the region of the left eyebrow to suggest tumor recurrence.  7/12/22: S/p surgical resection of left eyebrow recurrent squamous cell carcinoma with plastic surgery reconstruction. Surgical Pathology: Left supraorbital tumor at supraorbital nerve, biopsy - Fibroadipose tissue with minute focus of invasive squamous cell carcinoma.  2. left supraorbital mass lateral margin, biopsy - Fibroadipose and nerve tissue with no malignancy identified.  3. left supraorbital mass, medial margin, biopsy - Fibroadipose tissue and skeletal muscle with no malignancy identified.  4. left supraorbital tumor, excision - Invasive squamous cell carcinoma, well differentiated, 1.0 cm in greatest dimension.  - Perineural invasion is present.  No lymphovascular invasion identified.  - The resection margins appear to be negative for carcinoma. The tumor is very close (less than 0.1 mm) to the nearest margin (posterior).   9/20/2022 Completed planned RT to left periorbital total dose of 6000cGy.  Most recent MRI orbit from 9/2023 shows stable imaging with no interval change  Presents today for follow up.

## 2024-03-15 ENCOUNTER — OUTPATIENT (OUTPATIENT)
Dept: OUTPATIENT SERVICES | Facility: HOSPITAL | Age: 64
LOS: 1 days | End: 2024-03-15
Payer: COMMERCIAL

## 2024-03-15 ENCOUNTER — APPOINTMENT (OUTPATIENT)
Dept: MRI IMAGING | Facility: CLINIC | Age: 64
End: 2024-03-15
Payer: COMMERCIAL

## 2024-03-15 DIAGNOSIS — Z98.890 OTHER SPECIFIED POSTPROCEDURAL STATES: Chronic | ICD-10-CM

## 2024-03-15 DIAGNOSIS — Z85.828 PERSONAL HISTORY OF OTHER MALIGNANT NEOPLASM OF SKIN: Chronic | ICD-10-CM

## 2024-03-15 DIAGNOSIS — Z94.0 KIDNEY TRANSPLANT STATUS: Chronic | ICD-10-CM

## 2024-03-15 DIAGNOSIS — C44.320 SQUAMOUS CELL CARCINOMA OF SKIN OF UNSPECIFIED PARTS OF FACE: ICD-10-CM

## 2024-03-15 DIAGNOSIS — C44.92 SQUAMOUS CELL CARCINOMA OF SKIN, UNSPECIFIED: ICD-10-CM

## 2024-03-15 PROCEDURE — 70542 MRI ORBIT/FACE/NECK W/DYE: CPT

## 2024-03-15 PROCEDURE — A9585: CPT

## 2024-03-15 PROCEDURE — 70542 MRI ORBIT/FACE/NECK W/DYE: CPT | Mod: 26

## 2024-05-04 NOTE — ASU PREOP CHECKLIST - BSA (M2)
Problem: Safety - Adult  Goal: Free from fall injury  Outcome: Progressing     Problem: Skin/Tissue Integrity  Goal: Absence of new skin breakdown  Description: 1.  Monitor for areas of redness and/or skin breakdown  2.  Assess vascular access sites hourly  3.  Every 4-6 hours minimum:  Change oxygen saturation probe site  4.  Every 4-6 hours:  If on nasal continuous positive airway pressure, respiratory therapy assess nares and determine need for appliance change or resting period.  Outcome: Progressing     Problem: ABCDS Injury Assessment  Goal: Absence of physical injury  Outcome: Progressing     Problem: Discharge Planning  Goal: Discharge to home or other facility with appropriate resources  Outcome: Progressing     Problem: Pain  Goal: Verbalizes/displays adequate comfort level or baseline comfort level  Outcome: Progressing      2.17

## 2024-05-15 ENCOUNTER — APPOINTMENT (OUTPATIENT)
Dept: INTERNAL MEDICINE | Facility: CLINIC | Age: 64
End: 2024-05-15

## 2024-05-15 ENCOUNTER — OUTPATIENT (OUTPATIENT)
Dept: OUTPATIENT SERVICES | Facility: HOSPITAL | Age: 64
LOS: 1 days | End: 2024-05-15

## 2024-05-15 DIAGNOSIS — Z98.890 OTHER SPECIFIED POSTPROCEDURAL STATES: Chronic | ICD-10-CM

## 2024-06-03 NOTE — ED ADULT TRIAGE NOTE - RESPIRATORY RATE (BREATHS/MIN)
16

## 2024-08-20 ENCOUNTER — APPOINTMENT (OUTPATIENT)
Dept: DERMATOLOGY | Facility: CLINIC | Age: 64
End: 2024-08-20
Payer: COMMERCIAL

## 2024-08-20 VITALS — HEIGHT: 71 IN | BODY MASS INDEX: 28 KG/M2 | WEIGHT: 200 LBS

## 2024-08-20 PROCEDURE — 11102 TANGNTL BX SKIN SINGLE LES: CPT

## 2024-08-20 PROCEDURE — 99214 OFFICE O/P EST MOD 30 MIN: CPT | Mod: 25

## 2024-08-22 NOTE — HISTORY OF PRESENT ILLNESS
[FreeTextEntry1] : f/u [de-identified] : 62 yo M, hx of kidney transplant 18 years ago, on tacrolimus and sirolimus, presenting for f/u.   Started on dupixent 12/2023 for eczematous rash, very happy with results, minimal rash today  HISTORY: Eczematous derm under context of switching from cellcept-->sirolimus, also possible component of ACD (s/p patch testing, has been avoiding allergens). rash somewhat better but still itchy and still with rashes mostly on extremities. Using opzelura sparingly which helps.  10/2023: biopsy taken for H&E & DIF, which showed subacute dermatitis with DIF negative for any autoimmune bullous disease. Has been using vanicream and opzelura. Still very itchy with affected skin widespread. Still with itchy areas and new spots. Has lower eyelid edema, that he uses hydrocortisone for.     # history of extensive and recurrent SCC of the L temple/orbital/forehead region  - first diagnosis was in 2016, had Mohs done; was clear for about 2 years  - new growth in 2018, again an SCC within the same general region, had Mohs and radiation  - clear until 2020, when grew new lesions close to the eyebrow, went to surg onc for removal  - 2021, additional SCC within same general area that was removed by surg onc, radiation again  - most recently s/p wide excision of L temple SCC 1/2022, again resection in 7/2022 and most recent radiation completed in 9/2022  Due for TBSE.

## 2024-08-22 NOTE — PHYSICAL EXAM
[Alert] : alert [Oriented x 3] : ~L oriented x 3 [Well Nourished] : well nourished [Conjunctiva Non-injected] : conjunctiva non-injected [No Visual Lymphadenopathy] : no visual  lymphadenopathy [No Clubbing] : no clubbing [No Edema] : no edema [No Bromhidrosis] : no bromhidrosis [No Chromhidrosis] : no chromhidrosis [FreeTextEntry3] : A complete skin examination was performed of the scalp/hair, head/face, conjunctivae/lids, lips/teeth/gums, neck, digits/nails, right and left axilla, right and left upper and lower extremities, chest, abdomen, back, buttocks and groin area. No bromohidrosis. No hyperhidrosis.   Notable findings are documented below: atrophic scar on left temple NER pink papule on left forhead

## 2024-08-22 NOTE — HISTORY OF PRESENT ILLNESS
[FreeTextEntry1] : f/u [de-identified] : 62 yo M, hx of kidney transplant 18 years ago, on tacrolimus and sirolimus, presenting for f/u.   Started on dupixent 12/2023 for eczematous rash, very happy with results, minimal rash today  HISTORY: Eczematous derm under context of switching from cellcept-->sirolimus, also possible component of ACD (s/p patch testing, has been avoiding allergens). rash somewhat better but still itchy and still with rashes mostly on extremities. Using opzelura sparingly which helps.  10/2023: biopsy taken for H&E & DIF, which showed subacute dermatitis with DIF negative for any autoimmune bullous disease. Has been using vanicream and opzelura. Still very itchy with affected skin widespread. Still with itchy areas and new spots. Has lower eyelid edema, that he uses hydrocortisone for.     # history of extensive and recurrent SCC of the L temple/orbital/forehead region  - first diagnosis was in 2016, had Mohs done; was clear for about 2 years  - new growth in 2018, again an SCC within the same general region, had Mohs and radiation  - clear until 2020, when grew new lesions close to the eyebrow, went to surg onc for removal  - 2021, additional SCC within same general area that was removed by surg onc, radiation again  - most recently s/p wide excision of L temple SCC 1/2022, again resection in 7/2022 and most recent radiation completed in 9/2022  Due for TBSE.

## 2024-08-22 NOTE — ASSESSMENT
[FreeTextEntry1] : # eczematous dermatitis on face and arms, some areas appear more edematous w/out scale, new onset since 6/2023, coincides with switching from cellcept-->sirolimus, suspicion for superimposed ACD Outside patch testing (?140 patches, Dr. Jones), tested positive to sodium darya sulfate, benzoic acid, copper. LABS notable for peripheral eosinophilia (abs 0.61), IgE WNL, BP abs neg  Punch biopsy for H&E and DIF done 10/2023. H&E: subacute dermatitis, DIF negative  On dupixent since 12/2023 Chronic, improving, not at tx goal - education, counseling - c/w DUPIXENT 300mg q 2 weeks.  Risks of medication again reviewed including but not limited to:  increased risk of injection site reaction, hypersensitivity reaction, conjunctivitis, keratitis, cold sores. Pt advised to avoid live vaccines while on this medication. - reiterated importance of adhering to SAFE list. Advised it make take some more time for dermatitis to resolve. patch tst results may also have been confounded by immunosuppression for organ tx - clobetasol 0.05% ointment BID to the affected area prn roughness/itch. Do not apply to face/groin. Do not use for more than 2 weeks at a time, with 1 week off in between. Side effects discussed with pt including but not limited to thinning of the skin, atrophy and dyspigmentation.  # Hx of recurrent SCC of the L temple/orbital region, s/p most recent surgery and radiation completed 9/2022 Pt with kidney transplant 18 years ago, on mycophenolate and tacrolimus - NER - no head/neck LAD  #NUB, left cheek r/o NMSC Biopsy by Shave  The risks/benefits/alternatives of skin biopsy were explained to the patient, which include and are not limited to bleeding, infection, scarring or discoloration of skin, and recurrence of lesion. Patient expressed understanding of these risks and provided consent to the procedure. Time out with verification of patient and lesion site was performed. Site was prepped with rubbing alcohol, lidocaine with epinephrine was injected for anesthesia, and biopsy was performed. Specimen sent to path. Procedure was without complication and well tolerated. Wound care was discussed.  Screening for skin cancer -ABCDEs of melanoma, sun safety, and self-skin check reviewed -Counseled pt to notify us of any changing or concerning lesions - Advised sun protection, SPF 30 or higher daily and reapply often, sun protective clothing - TBSE performed today, with 1 lesions concerning for malignancy - Next TBSE due 6 mos

## 2024-09-04 ENCOUNTER — NON-APPOINTMENT (OUTPATIENT)
Age: 64
End: 2024-09-04

## 2024-09-25 ENCOUNTER — APPOINTMENT (OUTPATIENT)
Dept: DERMATOLOGY | Facility: CLINIC | Age: 64
End: 2024-09-25
Payer: COMMERCIAL

## 2024-09-25 DIAGNOSIS — D09.9 CARCINOMA IN SITU, UNSPECIFIED: ICD-10-CM

## 2024-09-25 DIAGNOSIS — C44.320 SQUAMOUS CELL CARCINOMA OF SKIN OF UNSPECIFIED PARTS OF FACE: ICD-10-CM

## 2024-09-25 PROCEDURE — 99204 OFFICE O/P NEW MOD 45 MIN: CPT

## 2024-11-03 NOTE — END OF VISIT
Patient called writer with concerns he has not been scheduled with home care PT/OT yet. States he was contacted on Thursday or Friday and asked to be called back and has received no return calls. Writer contacted [facility] and PSR will send message to  to call patient. Patient appreciative of assistance.    Writer will check in with patient this evening to see if he was able to get scheduled.    UPDATE : Spoke with patient and he was contacted by home care. States someone coming on Friday 11/8.    Writer answered all questions   [Time Spent: ___ minutes] : I have spent [unfilled] minutes of time on the encounter.

## 2024-11-05 ENCOUNTER — APPOINTMENT (OUTPATIENT)
Dept: DERMATOLOGY | Facility: CLINIC | Age: 64
End: 2024-11-05
Payer: COMMERCIAL

## 2024-11-05 ENCOUNTER — NON-APPOINTMENT (OUTPATIENT)
Age: 64
End: 2024-11-05

## 2024-11-05 PROCEDURE — 17312 MOHS ADDL STAGE: CPT

## 2024-11-05 PROCEDURE — 13132 CMPLX RPR F/C/C/M/N/AX/G/H/F: CPT

## 2024-11-05 PROCEDURE — 17311 MOHS 1 STAGE H/N/HF/G: CPT

## 2024-11-12 ENCOUNTER — APPOINTMENT (OUTPATIENT)
Dept: DERMATOLOGY | Facility: CLINIC | Age: 64
End: 2024-11-12
Payer: COMMERCIAL

## 2024-11-12 DIAGNOSIS — C44.329 SQUAMOUS CELL CARCINOMA OF SKIN OF OTHER PARTS OF FACE: ICD-10-CM

## 2024-11-12 PROCEDURE — 99024 POSTOP FOLLOW-UP VISIT: CPT

## 2024-11-13 ENCOUNTER — OUTPATIENT (OUTPATIENT)
Dept: OUTPATIENT SERVICES | Facility: HOSPITAL | Age: 64
LOS: 1 days | End: 2024-11-13

## 2024-11-13 ENCOUNTER — APPOINTMENT (OUTPATIENT)
Dept: INTERNAL MEDICINE | Facility: CLINIC | Age: 64
End: 2024-11-13

## 2024-11-13 DIAGNOSIS — Z98.890 OTHER SPECIFIED POSTPROCEDURAL STATES: Chronic | ICD-10-CM

## 2025-01-09 NOTE — REVIEW OF SYSTEMS
Problem: Chronic Conditions and Co-morbidities  Goal: Patient's chronic conditions and co-morbidity symptoms are monitored and maintained or improved  1/9/2025 0724 by Linda Mckeon RN  Outcome: Progressing  1/8/2025 2146 by Ginger Charles RN  Outcome: Progressing  Flowsheets (Taken 1/8/2025 1929)  Care Plan - Patient's Chronic Conditions and Co-Morbidity Symptoms are Monitored and Maintained or Improved: Monitor and assess patient's chronic conditions and comorbid symptoms for stability, deterioration, or improvement     Problem: Discharge Planning  Goal: Discharge to home or other facility with appropriate resources  1/9/2025 0724 by Linda Mckeon RN  Outcome: Progressing  1/8/2025 2146 by Ginger Charles RN  Outcome: Progressing  Flowsheets (Taken 1/8/2025 1929)  Discharge to home or other facility with appropriate resources:   Identify barriers to discharge with patient and caregiver   Identify discharge learning needs (meds, wound care, etc)   Refer to discharge planning if patient needs post-hospital services based on physician order or complex needs related to functional status, cognitive ability or social support system     Problem: Pain  Goal: Verbalizes/displays adequate comfort level or baseline comfort level  1/9/2025 0724 by Linda Mckeon RN  Outcome: Progressing  1/8/2025 2146 by Ginger Charles RN  Outcome: Progressing     Problem: Skin/Tissue Integrity  Goal: Absence of new skin breakdown  Description: 1.  Monitor for areas of redness and/or skin breakdown  2.  Assess vascular access sites hourly  3.  Every 4-6 hours minimum:  Change oxygen saturation probe site  4.  Every 4-6 hours:  If on nasal continuous positive airway pressure, respiratory therapy assess nares and determine need for appliance change or resting period.  1/9/2025 0724 by Linda Mckeon, RN  Outcome: Progressing  1/8/2025 2146 by Ginger Charles RN  Outcome: Progressing     Problem: Safety - Adult  Goal: Free from  [Edema Limbs: Grade 0] : Edema Limbs: Grade 0  [Fatigue: Grade 0] : Fatigue: Grade 0 [Localized Edema: Grade 0] : Localized Edema: Grade 0  [Neck Edema: Grade 0] : Neck Edema: Grade 0 [Tinnitus - Grade 0] : Tinnitus - Grade 0 [Blurred Vision: Grade 0] : Blurred Vision: Grade 0 [Mucositis Oral: Grade 0] : Mucositis Oral: Grade 0  [Xerostomia: Grade 0] : Xerostomia: Grade 0 [Oral Pain: Grade 0] : Oral Pain: Grade 0 [Salivary duct inflammation: Grade 0] : Salivary duct inflammation: Grade 0 [Dysgeusia: Grade 0] : Dysgeusia: Grade 0 [Alopecia: Grade 0] : Alopecia: Grade 0 [Pruritus: Grade 0] : Pruritus: Grade 0 [Skin Atrophy: Grade 0] : Skin Atrophy: Grade 0 [Skin Hyperpigmentation: Grade 0] : Skin Hyperpigmentation: Grade 0 [Skin Induration: Grade 0] : Skin Induration: Grade 0 [Dermatitis Radiation: Grade 1 - Faint erythema or dry desquamation] : Dermatitis Radiation: Grade 1 - Faint erythema or dry desquamation

## 2025-02-04 ENCOUNTER — RX RENEWAL (OUTPATIENT)
Age: 65
End: 2025-02-04

## 2025-02-04 ENCOUNTER — APPOINTMENT (OUTPATIENT)
Dept: DERMATOLOGY | Facility: CLINIC | Age: 65
End: 2025-02-04
Payer: COMMERCIAL

## 2025-02-04 ENCOUNTER — NON-APPOINTMENT (OUTPATIENT)
Age: 65
End: 2025-02-04

## 2025-02-04 PROCEDURE — G2211 COMPLEX E/M VISIT ADD ON: CPT | Mod: NC

## 2025-02-04 PROCEDURE — 99214 OFFICE O/P EST MOD 30 MIN: CPT

## 2025-02-04 RX ORDER — FLUOROURACIL 50 MG/G
5 CREAM TOPICAL
Qty: 1 | Refills: 1 | Status: ACTIVE | COMMUNITY
Start: 2025-02-04 | End: 1900-01-01

## 2025-03-05 ENCOUNTER — TRANSCRIPTION ENCOUNTER (OUTPATIENT)
Age: 65
End: 2025-03-05

## 2025-03-11 ENCOUNTER — OUTPATIENT (OUTPATIENT)
Dept: OUTPATIENT SERVICES | Facility: HOSPITAL | Age: 65
LOS: 1 days | End: 2025-03-11
Payer: COMMERCIAL

## 2025-03-11 ENCOUNTER — APPOINTMENT (OUTPATIENT)
Dept: MRI IMAGING | Facility: CLINIC | Age: 65
End: 2025-03-11
Payer: COMMERCIAL

## 2025-03-11 DIAGNOSIS — Z94.0 KIDNEY TRANSPLANT STATUS: Chronic | ICD-10-CM

## 2025-03-11 DIAGNOSIS — Z85.828 PERSONAL HISTORY OF OTHER MALIGNANT NEOPLASM OF SKIN: Chronic | ICD-10-CM

## 2025-03-11 DIAGNOSIS — Z98.890 OTHER SPECIFIED POSTPROCEDURAL STATES: Chronic | ICD-10-CM

## 2025-03-11 PROCEDURE — A9585: CPT

## 2025-03-11 PROCEDURE — 70542 MRI ORBIT/FACE/NECK W/DYE: CPT

## 2025-03-11 PROCEDURE — 70542 MRI ORBIT/FACE/NECK W/DYE: CPT | Mod: 26

## 2025-03-20 ENCOUNTER — APPOINTMENT (OUTPATIENT)
Dept: RADIATION ONCOLOGY | Facility: CLINIC | Age: 65
End: 2025-03-20

## 2025-03-20 VITALS
HEART RATE: 86 BPM | DIASTOLIC BLOOD PRESSURE: 72 MMHG | TEMPERATURE: 96.4 F | SYSTOLIC BLOOD PRESSURE: 120 MMHG | OXYGEN SATURATION: 100 % | HEIGHT: 71 IN | RESPIRATION RATE: 18 BRPM | BODY MASS INDEX: 29.77 KG/M2 | WEIGHT: 212.63 LBS

## 2025-03-20 PROCEDURE — 99215 OFFICE O/P EST HI 40 MIN: CPT

## 2025-03-20 RX ORDER — MYCOPHENOLATE MOFETIL 500 MG/1
500 TABLET ORAL
Refills: 0 | Status: ACTIVE | COMMUNITY
Start: 2025-03-20

## 2025-03-20 RX ORDER — TACROLIMUS 1 MG/1
1 CAPSULE ORAL
Refills: 0 | Status: ACTIVE | COMMUNITY
Start: 2025-03-20

## 2025-03-28 ENCOUNTER — TRANSCRIPTION ENCOUNTER (OUTPATIENT)
Age: 65
End: 2025-03-28

## 2025-03-31 ENCOUNTER — RX CHANGE (OUTPATIENT)
Age: 65
End: 2025-03-31

## 2025-03-31 RX ORDER — FAMOTIDINE 40 MG/1
40 TABLET, FILM COATED ORAL
Qty: 90 | Refills: 3 | Status: ACTIVE | COMMUNITY
Start: 1900-01-01 | End: 1900-01-01

## 2025-04-02 ENCOUNTER — TRANSCRIPTION ENCOUNTER (OUTPATIENT)
Age: 65
End: 2025-04-02

## 2025-04-10 ENCOUNTER — APPOINTMENT (OUTPATIENT)
Dept: DERMATOLOGY | Facility: CLINIC | Age: 65
End: 2025-04-10
Payer: COMMERCIAL

## 2025-04-10 DIAGNOSIS — D48.5 NEOPLASM OF UNCERTAIN BEHAVIOR OF SKIN: ICD-10-CM

## 2025-04-10 PROCEDURE — 99213 OFFICE O/P EST LOW 20 MIN: CPT | Mod: 25

## 2025-04-10 PROCEDURE — 11104 PUNCH BX SKIN SINGLE LESION: CPT

## 2025-04-14 LAB — DERMATOLOGY BIOPSY: NORMAL

## 2025-04-21 ENCOUNTER — NON-APPOINTMENT (OUTPATIENT)
Age: 65
End: 2025-04-21

## 2025-04-24 ENCOUNTER — APPOINTMENT (OUTPATIENT)
Dept: RADIATION ONCOLOGY | Facility: CLINIC | Age: 65
End: 2025-04-24

## 2025-04-24 VITALS
OXYGEN SATURATION: 98 % | DIASTOLIC BLOOD PRESSURE: 82 MMHG | BODY MASS INDEX: 29.12 KG/M2 | HEART RATE: 100 BPM | TEMPERATURE: 95.18 F | SYSTOLIC BLOOD PRESSURE: 148 MMHG | WEIGHT: 208 LBS | HEIGHT: 71 IN | RESPIRATION RATE: 17 BRPM

## 2025-04-24 PROCEDURE — ZZZZZ: CPT

## 2025-04-25 ENCOUNTER — OUTPATIENT (OUTPATIENT)
Dept: OUTPATIENT SERVICES | Facility: HOSPITAL | Age: 65
LOS: 1 days | Discharge: HOME | End: 2025-04-25

## 2025-04-25 ENCOUNTER — NON-APPOINTMENT (OUTPATIENT)
Age: 65
End: 2025-04-25

## 2025-04-25 DIAGNOSIS — Z85.828 PERSONAL HISTORY OF OTHER MALIGNANT NEOPLASM OF SKIN: Chronic | ICD-10-CM

## 2025-04-25 DIAGNOSIS — Z94.0 KIDNEY TRANSPLANT STATUS: Chronic | ICD-10-CM

## 2025-04-25 DIAGNOSIS — Z98.890 OTHER SPECIFIED POSTPROCEDURAL STATES: Chronic | ICD-10-CM

## 2025-04-25 DIAGNOSIS — C44.329 SQUAMOUS CELL CARCINOMA OF SKIN OF OTHER PARTS OF FACE: ICD-10-CM

## 2025-04-29 ENCOUNTER — APPOINTMENT (OUTPATIENT)
Dept: HEMATOLOGY ONCOLOGY | Facility: CLINIC | Age: 65
End: 2025-04-29

## 2025-05-06 ENCOUNTER — APPOINTMENT (OUTPATIENT)
Dept: DERMATOLOGY | Facility: CLINIC | Age: 65
End: 2025-05-06
Payer: COMMERCIAL

## 2025-05-06 PROCEDURE — 17000 DESTRUCT PREMALG LESION: CPT

## 2025-05-06 PROCEDURE — 99213 OFFICE O/P EST LOW 20 MIN: CPT | Mod: 25

## 2025-05-12 ENCOUNTER — TRANSCRIPTION ENCOUNTER (OUTPATIENT)
Age: 65
End: 2025-05-12

## 2025-05-12 ENCOUNTER — APPOINTMENT (OUTPATIENT)
Dept: INTERNAL MEDICINE | Facility: CLINIC | Age: 65
End: 2025-05-12
Payer: COMMERCIAL

## 2025-05-12 ENCOUNTER — NON-APPOINTMENT (OUTPATIENT)
Age: 65
End: 2025-05-12

## 2025-05-12 ENCOUNTER — OUTPATIENT (OUTPATIENT)
Dept: OUTPATIENT SERVICES | Facility: HOSPITAL | Age: 65
LOS: 1 days | End: 2025-05-12

## 2025-05-12 DIAGNOSIS — Z94.0 KIDNEY TRANSPLANT STATUS: Chronic | ICD-10-CM

## 2025-05-12 DIAGNOSIS — Z98.890 OTHER SPECIFIED POSTPROCEDURAL STATES: Chronic | ICD-10-CM

## 2025-05-14 RX ORDER — DOXYCYCLINE HYCLATE 100 MG/1
100 TABLET ORAL
Qty: 42 | Refills: 0 | Status: ACTIVE | COMMUNITY
Start: 2025-05-14 | End: 1900-01-01

## 2025-05-15 ENCOUNTER — RESULT REVIEW (OUTPATIENT)
Age: 65
End: 2025-05-15

## 2025-05-15 ENCOUNTER — APPOINTMENT (OUTPATIENT)
Dept: INFUSION THERAPY | Facility: HOSPITAL | Age: 65
End: 2025-05-15

## 2025-05-15 ENCOUNTER — NON-APPOINTMENT (OUTPATIENT)
Age: 65
End: 2025-05-15

## 2025-05-15 DIAGNOSIS — R11.2 NAUSEA WITH VOMITING, UNSPECIFIED: ICD-10-CM

## 2025-05-15 DIAGNOSIS — Z51.11 ENCOUNTER FOR ANTINEOPLASTIC CHEMOTHERAPY: ICD-10-CM

## 2025-05-15 LAB
ALBUMIN SERPL ELPH-MCNC: 4.4 G/DL — SIGNIFICANT CHANGE UP (ref 3.3–5)
ALBUMIN SERPL ELPH-MCNC: 4.5 G/DL — SIGNIFICANT CHANGE UP (ref 3.3–5)
ALP SERPL-CCNC: 102 U/L — SIGNIFICANT CHANGE UP (ref 40–120)
ALP SERPL-CCNC: 97 U/L — SIGNIFICANT CHANGE UP (ref 40–120)
ALT FLD-CCNC: 20 U/L — SIGNIFICANT CHANGE UP (ref 10–45)
ALT FLD-CCNC: 22 U/L — SIGNIFICANT CHANGE UP (ref 10–50)
ANION GAP SERPL CALC-SCNC: 16 MMOL/L — SIGNIFICANT CHANGE UP (ref 5–17)
AST SERPL-CCNC: 21 U/L — SIGNIFICANT CHANGE UP (ref 10–40)
AST SERPL-CCNC: 25 U/L — SIGNIFICANT CHANGE UP (ref 10–40)
BASOPHILS # BLD AUTO: 0.06 K/UL — SIGNIFICANT CHANGE UP (ref 0–0.2)
BASOPHILS NFR BLD AUTO: 0.8 % — SIGNIFICANT CHANGE UP (ref 0–2)
BILIRUB DIRECT SERPL-MCNC: 0.2 MG/DL — SIGNIFICANT CHANGE UP (ref 0–0.3)
BILIRUB INDIRECT FLD-MCNC: 0.3 MG/DL — SIGNIFICANT CHANGE UP (ref 0.2–1.2)
BILIRUB SERPL-MCNC: 0.4 MG/DL — SIGNIFICANT CHANGE UP (ref 0.2–1.2)
BILIRUB SERPL-MCNC: 0.5 MG/DL — SIGNIFICANT CHANGE UP (ref 0.2–1.2)
BUN SERPL-MCNC: 27 MG/DL — HIGH (ref 7–23)
CALCIUM SERPL-MCNC: 9.9 MG/DL — SIGNIFICANT CHANGE UP (ref 8.4–10.5)
CHLORIDE SERPL-SCNC: 103 MMOL/L — SIGNIFICANT CHANGE UP (ref 96–108)
CO2 SERPL-SCNC: 20 MMOL/L — LOW (ref 22–31)
CREAT SERPL-MCNC: 1.85 MG/DL — HIGH (ref 0.5–1.3)
EGFR: 40 ML/MIN/1.73M2 — LOW
EGFR: 40 ML/MIN/1.73M2 — LOW
EOSINOPHIL # BLD AUTO: 0.42 K/UL — SIGNIFICANT CHANGE UP (ref 0–0.5)
EOSINOPHIL NFR BLD AUTO: 5.6 % — SIGNIFICANT CHANGE UP (ref 0–6)
GLUCOSE SERPL-MCNC: 119 MG/DL — HIGH (ref 70–99)
HBV SURFACE AB SER-ACNC: ABNORMAL
HBV SURFACE AG SER-ACNC: SIGNIFICANT CHANGE UP
HCT VFR BLD CALC: 41.3 % — SIGNIFICANT CHANGE UP (ref 39–50)
HCV RNA SPEC NAA+PROBE-LOG IU: SIGNIFICANT CHANGE UP
HCV RNA SPEC NAA+PROBE-LOG IU: SIGNIFICANT CHANGE UP LOGIU/ML
HGB BLD-MCNC: 13.6 G/DL — SIGNIFICANT CHANGE UP (ref 13–17)
IMM GRANULOCYTES NFR BLD AUTO: 0.7 % — SIGNIFICANT CHANGE UP (ref 0–0.9)
LYMPHOCYTES # BLD AUTO: 0.83 K/UL — LOW (ref 1–3.3)
LYMPHOCYTES # BLD AUTO: 11 % — LOW (ref 13–44)
MAGNESIUM SERPL-MCNC: 2.1 MG/DL — SIGNIFICANT CHANGE UP (ref 1.6–2.6)
MCHC RBC-ENTMCNC: 30.8 PG — SIGNIFICANT CHANGE UP (ref 27–34)
MCHC RBC-ENTMCNC: 32.9 G/DL — SIGNIFICANT CHANGE UP (ref 32–36)
MCV RBC AUTO: 93.7 FL — SIGNIFICANT CHANGE UP (ref 80–100)
MONOCYTES # BLD AUTO: 0.96 K/UL — HIGH (ref 0–0.9)
MONOCYTES NFR BLD AUTO: 12.8 % — SIGNIFICANT CHANGE UP (ref 2–14)
NEUTROPHILS # BLD AUTO: 5.2 K/UL — SIGNIFICANT CHANGE UP (ref 1.8–7.4)
NEUTROPHILS NFR BLD AUTO: 69.1 % — SIGNIFICANT CHANGE UP (ref 43–77)
NRBC BLD AUTO-RTO: 0 /100 WBCS — SIGNIFICANT CHANGE UP (ref 0–0)
PLATELET # BLD AUTO: 222 K/UL — SIGNIFICANT CHANGE UP (ref 150–400)
POTASSIUM SERPL-MCNC: 4.7 MMOL/L — SIGNIFICANT CHANGE UP (ref 3.5–5.3)
POTASSIUM SERPL-SCNC: 4.7 MMOL/L — SIGNIFICANT CHANGE UP (ref 3.5–5.3)
PROT SERPL-MCNC: 6.4 G/DL — SIGNIFICANT CHANGE UP (ref 6–8.3)
PROT SERPL-MCNC: 6.9 G/DL — SIGNIFICANT CHANGE UP (ref 6–8.3)
RBC # BLD: 4.41 M/UL — SIGNIFICANT CHANGE UP (ref 4.2–5.8)
RBC # FLD: 14.2 % — SIGNIFICANT CHANGE UP (ref 10.3–14.5)
SODIUM SERPL-SCNC: 138 MMOL/L — SIGNIFICANT CHANGE UP (ref 135–145)
WBC # BLD: 7.52 K/UL — SIGNIFICANT CHANGE UP (ref 3.8–10.5)
WBC # FLD AUTO: 7.52 K/UL — SIGNIFICANT CHANGE UP (ref 3.8–10.5)

## 2025-05-15 PROCEDURE — 93010 ELECTROCARDIOGRAM REPORT: CPT

## 2025-05-22 RX ORDER — CLOBETASOL PROPIONATE 0.5 MG/G
0.05 CREAM TOPICAL TWICE DAILY
Qty: 1 | Refills: 0 | Status: ACTIVE | COMMUNITY
Start: 2025-05-22 | End: 1900-01-01

## 2025-05-28 ENCOUNTER — RESULT REVIEW (OUTPATIENT)
Age: 65
End: 2025-05-28

## 2025-05-28 ENCOUNTER — APPOINTMENT (OUTPATIENT)
Dept: HEMATOLOGY ONCOLOGY | Facility: CLINIC | Age: 65
End: 2025-05-28
Payer: COMMERCIAL

## 2025-05-28 VITALS
OXYGEN SATURATION: 99 % | BODY MASS INDEX: 29.36 KG/M2 | TEMPERATURE: 98 F | SYSTOLIC BLOOD PRESSURE: 131 MMHG | WEIGHT: 210.52 LBS | HEART RATE: 70 BPM | RESPIRATION RATE: 17 BRPM | DIASTOLIC BLOOD PRESSURE: 79 MMHG

## 2025-05-28 DIAGNOSIS — C44.92 SQUAMOUS CELL CARCINOMA OF SKIN, UNSPECIFIED: ICD-10-CM

## 2025-05-28 LAB
ALBUMIN SERPL ELPH-MCNC: 4.5 G/DL
ALP BLD-CCNC: 81 U/L
ALT SERPL-CCNC: 30 U/L
ANION GAP SERPL CALC-SCNC: 16 MMOL/L
AST SERPL-CCNC: 29 U/L
BASOPHILS # BLD AUTO: 0.08 K/UL — SIGNIFICANT CHANGE UP (ref 0–0.2)
BASOPHILS NFR BLD AUTO: 0.9 % — SIGNIFICANT CHANGE UP (ref 0–2)
BILIRUB SERPL-MCNC: 0.8 MG/DL
BUN SERPL-MCNC: 35 MG/DL
CALCIUM SERPL-MCNC: 9.8 MG/DL
CHLORIDE SERPL-SCNC: 103 MMOL/L
CO2 SERPL-SCNC: 19 MMOL/L
CREAT SERPL-MCNC: 1.88 MG/DL
EGFRCR SERPLBLD CKD-EPI 2021: 39 ML/MIN/1.73M2
EOSINOPHIL # BLD AUTO: 0.62 K/UL — HIGH (ref 0–0.5)
EOSINOPHIL NFR BLD AUTO: 7.2 % — HIGH (ref 0–6)
GLUCOSE SERPL-MCNC: 154 MG/DL
HCT VFR BLD CALC: 42.6 % — SIGNIFICANT CHANGE UP (ref 39–50)
HGB BLD-MCNC: 14 G/DL — SIGNIFICANT CHANGE UP (ref 13–17)
IMM GRANULOCYTES NFR BLD AUTO: 0.5 % — SIGNIFICANT CHANGE UP (ref 0–0.9)
LYMPHOCYTES # BLD AUTO: 0.76 K/UL — LOW (ref 1–3.3)
LYMPHOCYTES # BLD AUTO: 8.9 % — LOW (ref 13–44)
MAGNESIUM SERPL-MCNC: 1.9 MG/DL
MCHC RBC-ENTMCNC: 30.7 PG — SIGNIFICANT CHANGE UP (ref 27–34)
MCHC RBC-ENTMCNC: 32.9 G/DL — SIGNIFICANT CHANGE UP (ref 32–36)
MCV RBC AUTO: 93.4 FL — SIGNIFICANT CHANGE UP (ref 80–100)
MONOCYTES # BLD AUTO: 0.99 K/UL — HIGH (ref 0–0.9)
MONOCYTES NFR BLD AUTO: 11.5 % — SIGNIFICANT CHANGE UP (ref 2–14)
NEUTROPHILS # BLD AUTO: 6.09 K/UL — SIGNIFICANT CHANGE UP (ref 1.8–7.4)
NEUTROPHILS NFR BLD AUTO: 71 % — SIGNIFICANT CHANGE UP (ref 43–77)
NRBC BLD AUTO-RTO: 0 /100 WBCS — SIGNIFICANT CHANGE UP (ref 0–0)
PLATELET # BLD AUTO: 218 K/UL — SIGNIFICANT CHANGE UP (ref 150–400)
POTASSIUM SERPL-SCNC: 4.3 MMOL/L
PROT SERPL-MCNC: 6.4 G/DL
RBC # BLD: 4.56 M/UL — SIGNIFICANT CHANGE UP (ref 4.2–5.8)
RBC # FLD: 13.9 % — SIGNIFICANT CHANGE UP (ref 10.3–14.5)
SODIUM SERPL-SCNC: 139 MMOL/L
WBC # BLD: 8.58 K/UL — SIGNIFICANT CHANGE UP (ref 3.8–10.5)
WBC # FLD AUTO: 8.58 K/UL — SIGNIFICANT CHANGE UP (ref 3.8–10.5)

## 2025-05-28 PROCEDURE — G2211 COMPLEX E/M VISIT ADD ON: CPT | Mod: NC

## 2025-05-28 PROCEDURE — 99214 OFFICE O/P EST MOD 30 MIN: CPT

## 2025-05-29 ENCOUNTER — NON-APPOINTMENT (OUTPATIENT)
Age: 65
End: 2025-05-29

## 2025-05-29 ENCOUNTER — APPOINTMENT (OUTPATIENT)
Dept: INFUSION THERAPY | Facility: HOSPITAL | Age: 65
End: 2025-05-29

## 2025-05-29 ENCOUNTER — RESULT REVIEW (OUTPATIENT)
Age: 65
End: 2025-05-29

## 2025-05-29 VITALS
BODY MASS INDEX: 29.4 KG/M2 | WEIGHT: 210 LBS | RESPIRATION RATE: 16 BRPM | OXYGEN SATURATION: 98 % | HEART RATE: 100 BPM | TEMPERATURE: 96.98 F | DIASTOLIC BLOOD PRESSURE: 80 MMHG | HEIGHT: 71 IN | SYSTOLIC BLOOD PRESSURE: 136 MMHG

## 2025-05-29 LAB
ALBUMIN SERPL ELPH-MCNC: 4.1 G/DL — SIGNIFICANT CHANGE UP (ref 3.3–5)
ALP SERPL-CCNC: 91 U/L — SIGNIFICANT CHANGE UP (ref 40–120)
ALT FLD-CCNC: 25 U/L — SIGNIFICANT CHANGE UP (ref 10–45)
ANION GAP SERPL CALC-SCNC: 13 MMOL/L — SIGNIFICANT CHANGE UP (ref 5–17)
AST SERPL-CCNC: 27 U/L — SIGNIFICANT CHANGE UP (ref 10–40)
BASOPHILS # BLD AUTO: 0.07 K/UL — SIGNIFICANT CHANGE UP (ref 0–0.2)
BASOPHILS NFR BLD AUTO: 0.7 % — SIGNIFICANT CHANGE UP (ref 0–2)
BILIRUB SERPL-MCNC: 0.5 MG/DL — SIGNIFICANT CHANGE UP (ref 0.2–1.2)
BUN SERPL-MCNC: 33 MG/DL — HIGH (ref 7–23)
CALCIUM SERPL-MCNC: 9.5 MG/DL — SIGNIFICANT CHANGE UP (ref 8.4–10.5)
CHLORIDE SERPL-SCNC: 105 MMOL/L — SIGNIFICANT CHANGE UP (ref 96–108)
CO2 SERPL-SCNC: 20 MMOL/L — LOW (ref 22–31)
CREAT SERPL-MCNC: 1.8 MG/DL — HIGH (ref 0.5–1.3)
EGFR: 42 ML/MIN/1.73M2 — LOW
EGFR: 42 ML/MIN/1.73M2 — LOW
EOSINOPHIL # BLD AUTO: 0.55 K/UL — HIGH (ref 0–0.5)
EOSINOPHIL NFR BLD AUTO: 5.5 % — SIGNIFICANT CHANGE UP (ref 0–6)
GLUCOSE SERPL-MCNC: 132 MG/DL — HIGH (ref 70–99)
HCT VFR BLD CALC: 40.4 % — SIGNIFICANT CHANGE UP (ref 39–50)
HGB BLD-MCNC: 13.1 G/DL — SIGNIFICANT CHANGE UP (ref 13–17)
IMM GRANULOCYTES NFR BLD AUTO: 0.4 % — SIGNIFICANT CHANGE UP (ref 0–0.9)
LYMPHOCYTES # BLD AUTO: 0.8 K/UL — LOW (ref 1–3.3)
LYMPHOCYTES # BLD AUTO: 8.1 % — LOW (ref 13–44)
MAGNESIUM SERPL-MCNC: 1.8 MG/DL — SIGNIFICANT CHANGE UP (ref 1.6–2.6)
MCHC RBC-ENTMCNC: 30.5 PG — SIGNIFICANT CHANGE UP (ref 27–34)
MCHC RBC-ENTMCNC: 32.4 G/DL — SIGNIFICANT CHANGE UP (ref 32–36)
MCV RBC AUTO: 94 FL — SIGNIFICANT CHANGE UP (ref 80–100)
MONOCYTES # BLD AUTO: 1 K/UL — HIGH (ref 0–0.9)
MONOCYTES NFR BLD AUTO: 10.1 % — SIGNIFICANT CHANGE UP (ref 2–14)
NEUTROPHILS # BLD AUTO: 7.45 K/UL — HIGH (ref 1.8–7.4)
NEUTROPHILS NFR BLD AUTO: 75.2 % — SIGNIFICANT CHANGE UP (ref 43–77)
NRBC BLD AUTO-RTO: 0 /100 WBCS — SIGNIFICANT CHANGE UP (ref 0–0)
PLATELET # BLD AUTO: 239 K/UL — SIGNIFICANT CHANGE UP (ref 150–400)
POTASSIUM SERPL-MCNC: 4.4 MMOL/L — SIGNIFICANT CHANGE UP (ref 3.5–5.3)
POTASSIUM SERPL-SCNC: 4.4 MMOL/L — SIGNIFICANT CHANGE UP (ref 3.5–5.3)
PROT SERPL-MCNC: 6.4 G/DL — SIGNIFICANT CHANGE UP (ref 6–8.3)
RBC # BLD: 4.3 M/UL — SIGNIFICANT CHANGE UP (ref 4.2–5.8)
RBC # FLD: 14 % — SIGNIFICANT CHANGE UP (ref 10.3–14.5)
SODIUM SERPL-SCNC: 138 MMOL/L — SIGNIFICANT CHANGE UP (ref 135–145)
WBC # BLD: 9.91 K/UL — SIGNIFICANT CHANGE UP (ref 3.8–10.5)
WBC # FLD AUTO: 9.91 K/UL — SIGNIFICANT CHANGE UP (ref 3.8–10.5)

## 2025-05-29 RX ORDER — SILVER SULFADIAZINE 10 MG/G
1 CREAM TOPICAL TWICE DAILY
Qty: 1 | Refills: 1 | Status: ACTIVE | COMMUNITY
Start: 2025-05-29 | End: 1900-01-01

## 2025-06-05 ENCOUNTER — NON-APPOINTMENT (OUTPATIENT)
Age: 65
End: 2025-06-05

## 2025-06-12 ENCOUNTER — TRANSCRIPTION ENCOUNTER (OUTPATIENT)
Age: 65
End: 2025-06-12

## 2025-07-02 ENCOUNTER — APPOINTMENT (OUTPATIENT)
Dept: RADIATION ONCOLOGY | Facility: CLINIC | Age: 65
End: 2025-07-02

## 2025-07-02 VITALS
RESPIRATION RATE: 16 BRPM | TEMPERATURE: 97.7 F | OXYGEN SATURATION: 98 % | HEART RATE: 85 BPM | SYSTOLIC BLOOD PRESSURE: 121 MMHG | DIASTOLIC BLOOD PRESSURE: 78 MMHG | WEIGHT: 207 LBS | BODY MASS INDEX: 28.98 KG/M2 | HEIGHT: 71 IN

## 2025-07-02 PROCEDURE — 99215 OFFICE O/P EST HI 40 MIN: CPT

## 2025-07-15 ENCOUNTER — APPOINTMENT (OUTPATIENT)
Dept: FAMILY MEDICINE | Facility: CLINIC | Age: 65
End: 2025-07-15
Payer: COMMERCIAL

## 2025-07-15 VITALS
SYSTOLIC BLOOD PRESSURE: 117 MMHG | WEIGHT: 203 LBS | HEIGHT: 71 IN | OXYGEN SATURATION: 96 % | HEART RATE: 89 BPM | RESPIRATION RATE: 16 BRPM | TEMPERATURE: 98.4 F | BODY MASS INDEX: 28.42 KG/M2 | DIASTOLIC BLOOD PRESSURE: 69 MMHG

## 2025-07-15 PROBLEM — Z23 ENCOUNTER FOR IMMUNIZATION: Status: ACTIVE | Noted: 2025-07-15 | Resolved: 2025-07-29

## 2025-07-15 PROBLEM — D84.9 IMMUNOSUPPRESSED STATUS: Status: ACTIVE | Noted: 2025-07-15

## 2025-07-15 PROCEDURE — 99396 PREV VISIT EST AGE 40-64: CPT

## 2025-07-15 RX ORDER — BACITRACIN 500 [IU]/G
500 OINTMENT TOPICAL 3 TIMES DAILY
Qty: 1 | Refills: 3 | Status: DISCONTINUED | COMMUNITY
Start: 2025-07-08 | End: 2025-07-15

## 2025-07-20 RX ORDER — BELATACEPT 250 MG/1
250 INJECTION, POWDER, LYOPHILIZED, FOR SOLUTION INTRAVENOUS
Qty: 1 | Refills: 0 | Status: ACTIVE | COMMUNITY
Start: 2025-07-20

## 2025-07-20 RX ORDER — FAMOTIDINE 20 MG/1
20 TABLET, FILM COATED ORAL
Qty: 180 | Refills: 1 | Status: ACTIVE | COMMUNITY
Start: 2025-07-20 | End: 1900-01-01

## 2025-07-21 NOTE — END OF VISIT
[Time Spent: ___ minutes] : I have spent [unfilled] minutes of time on the encounter. Show Spray Paint Technique Variable?: Yes Medical Necessity Clause: This procedure was medically necessary because the lesions that were treated were: Medical Necessity Information: It is in your best interest to select a reason for this procedure from the list below. All of these items fulfill various CMS LCD requirements except the new and changing color options. Add 52 Modifier (Optional): no Detail Level: Detailed Consent: The patient's consent was obtained including but not limited to risks of crusting, scabbing, blistering, scarring, darker or lighter pigmentary change, recurrence, incomplete removal and infection. Application Tool (Optional): Liquid Nitrogen Sprayer Number Of Freeze-Thaw Cycles: 2 freeze-thaw cycles Duration Of Freeze Thaw-Cycle (Seconds): 5 Spray Paint Text: The liquid nitrogen was applied to the skin utilizing a spray paint frosting technique. Post-Care Instructions: I reviewed with the patient in detail post-care instructions. Patient is to wear sunprotection, and avoid picking at any of the treated lesions. Pt may apply Vaseline to crusted or scabbing areas.

## 2025-07-24 ENCOUNTER — APPOINTMENT (OUTPATIENT)
Dept: DERMATOLOGY | Facility: CLINIC | Age: 65
End: 2025-07-24
Payer: COMMERCIAL

## 2025-07-24 DIAGNOSIS — C44.320 SQUAMOUS CELL CARCINOMA OF SKIN OF UNSPECIFIED PARTS OF FACE: ICD-10-CM

## 2025-07-24 PROCEDURE — G2211 COMPLEX E/M VISIT ADD ON: CPT | Mod: NC

## 2025-07-24 PROCEDURE — 99214 OFFICE O/P EST MOD 30 MIN: CPT

## 2025-07-25 ENCOUNTER — TRANSCRIPTION ENCOUNTER (OUTPATIENT)
Age: 65
End: 2025-07-25

## 2025-07-25 ENCOUNTER — RESULT REVIEW (OUTPATIENT)
Age: 65
End: 2025-07-25

## 2025-07-25 LAB
25(OH)D3 SERPL-MCNC: 33.9 NG/ML
CHOLEST SERPL-MCNC: 168 MG/DL
ESTIMATED AVERAGE GLUCOSE: 105 MG/DL
HBA1C MFR BLD HPLC: 5.3 %
HDLC SERPL-MCNC: 51 MG/DL
LDLC SERPL-MCNC: 103 MG/DL
MEV IGG FLD QL IA: 118 AU/ML
MEV IGG+IGM SER-IMP: POSITIVE
NONHDLC SERPL-MCNC: 117 MG/DL
PSA SERPL-MCNC: 10.5 NG/ML
TRIGL SERPL-MCNC: 76 MG/DL

## 2025-08-05 ENCOUNTER — APPOINTMENT (OUTPATIENT)
Dept: UROLOGY | Facility: CLINIC | Age: 65
End: 2025-08-05
Payer: COMMERCIAL

## 2025-08-05 VITALS
HEIGHT: 71 IN | WEIGHT: 188 LBS | BODY MASS INDEX: 26.32 KG/M2 | DIASTOLIC BLOOD PRESSURE: 81 MMHG | SYSTOLIC BLOOD PRESSURE: 132 MMHG | RESPIRATION RATE: 16 BRPM | TEMPERATURE: 97.2 F | HEART RATE: 74 BPM | OXYGEN SATURATION: 96 %

## 2025-08-05 DIAGNOSIS — R97.20 ELEVATED PROSTATE, SPECIFIC ANTIGEN [PSA]: ICD-10-CM

## 2025-08-05 DIAGNOSIS — Z78.9 OTHER SPECIFIED HEALTH STATUS: ICD-10-CM

## 2025-08-05 PROCEDURE — 99204 OFFICE O/P NEW MOD 45 MIN: CPT

## 2025-08-05 RX ORDER — MUPIROCIN 20 MG/G
2 OINTMENT TOPICAL 3 TIMES DAILY
Qty: 1 | Refills: 3 | Status: COMPLETED | COMMUNITY
Start: 2025-07-24 | End: 2025-08-05

## 2025-08-06 LAB
PSA FREE FLD-MCNC: 37 %
PSA FREE SERPL-MCNC: 2.49 NG/ML
PSA SERPL-MCNC: 6.72 NG/ML

## 2025-08-13 ENCOUNTER — APPOINTMENT (OUTPATIENT)
Dept: MRI IMAGING | Facility: IMAGING CENTER | Age: 65
End: 2025-08-13
Payer: COMMERCIAL

## 2025-08-13 ENCOUNTER — OUTPATIENT (OUTPATIENT)
Dept: OUTPATIENT SERVICES | Facility: HOSPITAL | Age: 65
LOS: 1 days | End: 2025-08-13
Payer: COMMERCIAL

## 2025-08-13 ENCOUNTER — RESULT REVIEW (OUTPATIENT)
Age: 65
End: 2025-08-13

## 2025-08-13 DIAGNOSIS — R97.20 ELEVATED PROSTATE SPECIFIC ANTIGEN [PSA]: ICD-10-CM

## 2025-08-13 DIAGNOSIS — Z94.0 KIDNEY TRANSPLANT STATUS: Chronic | ICD-10-CM

## 2025-08-13 DIAGNOSIS — Z00.8 ENCOUNTER FOR OTHER GENERAL EXAMINATION: ICD-10-CM

## 2025-08-13 DIAGNOSIS — Z98.890 OTHER SPECIFIED POSTPROCEDURAL STATES: Chronic | ICD-10-CM

## 2025-08-13 DIAGNOSIS — Z85.828 PERSONAL HISTORY OF OTHER MALIGNANT NEOPLASM OF SKIN: Chronic | ICD-10-CM

## 2025-08-13 PROCEDURE — 76498P: CUSTOM | Mod: 26

## 2025-08-13 PROCEDURE — A9585: CPT

## 2025-08-13 PROCEDURE — 72197 MRI PELVIS W/O & W/DYE: CPT | Mod: 26

## 2025-08-13 PROCEDURE — 72197 MRI PELVIS W/O & W/DYE: CPT

## 2025-08-13 PROCEDURE — 76498 UNLISTED MR PROCEDURE: CPT

## 2025-08-19 ENCOUNTER — OUTPATIENT (OUTPATIENT)
Dept: OUTPATIENT SERVICES | Facility: HOSPITAL | Age: 65
LOS: 1 days | End: 2025-08-19
Payer: COMMERCIAL

## 2025-08-19 DIAGNOSIS — Z85.828 PERSONAL HISTORY OF OTHER MALIGNANT NEOPLASM OF SKIN: Chronic | ICD-10-CM

## 2025-08-19 DIAGNOSIS — Z98.890 OTHER SPECIFIED POSTPROCEDURAL STATES: Chronic | ICD-10-CM

## 2025-08-19 DIAGNOSIS — Z94.0 KIDNEY TRANSPLANT STATUS: Chronic | ICD-10-CM

## 2025-08-19 DIAGNOSIS — Z00.8 ENCOUNTER FOR OTHER GENERAL EXAMINATION: ICD-10-CM

## 2025-08-20 ENCOUNTER — RESULT REVIEW (OUTPATIENT)
Age: 65
End: 2025-08-20

## 2025-08-20 DIAGNOSIS — R97.20 ELEVATED PROSTATE SPECIFIC ANTIGEN [PSA]: ICD-10-CM

## 2025-08-20 PROCEDURE — C8001: CPT

## 2025-08-28 ENCOUNTER — APPOINTMENT (OUTPATIENT)
Dept: UROLOGY | Facility: CLINIC | Age: 65
End: 2025-08-28
Payer: COMMERCIAL

## 2025-08-28 VITALS
HEIGHT: 71 IN | HEART RATE: 85 BPM | WEIGHT: 202 LBS | DIASTOLIC BLOOD PRESSURE: 80 MMHG | SYSTOLIC BLOOD PRESSURE: 129 MMHG | BODY MASS INDEX: 28.28 KG/M2 | OXYGEN SATURATION: 99 %

## 2025-08-28 DIAGNOSIS — R97.20 ELEVATED PROSTATE, SPECIFIC ANTIGEN [PSA]: ICD-10-CM

## 2025-08-28 DIAGNOSIS — N40.0 BENIGN PROSTATIC HYPERPLASIA WITHOUT LOWER URINARY TRACT SYMPMS: ICD-10-CM

## 2025-08-28 PROCEDURE — 99213 OFFICE O/P EST LOW 20 MIN: CPT

## 2025-08-28 PROCEDURE — G2211 COMPLEX E/M VISIT ADD ON: CPT | Mod: NC

## 2025-08-29 LAB
PSA FREE FLD-MCNC: 36 %
PSA FREE SERPL-MCNC: 2.58 NG/ML
PSA SERPL-MCNC: 7.08 NG/ML

## 2025-09-04 ENCOUNTER — APPOINTMENT (OUTPATIENT)
Dept: DERMATOLOGY | Facility: CLINIC | Age: 65
End: 2025-09-04
Payer: COMMERCIAL

## 2025-09-04 PROCEDURE — G2211 COMPLEX E/M VISIT ADD ON: CPT | Mod: NC

## 2025-09-04 PROCEDURE — 99214 OFFICE O/P EST MOD 30 MIN: CPT

## 2025-09-05 ENCOUNTER — OUTPATIENT (OUTPATIENT)
Dept: OUTPATIENT SERVICES | Facility: HOSPITAL | Age: 65
LOS: 1 days | End: 2025-09-05
Payer: COMMERCIAL

## 2025-09-05 ENCOUNTER — APPOINTMENT (OUTPATIENT)
Dept: MRI IMAGING | Facility: IMAGING CENTER | Age: 65
End: 2025-09-05

## 2025-09-05 DIAGNOSIS — Z98.890 OTHER SPECIFIED POSTPROCEDURAL STATES: Chronic | ICD-10-CM

## 2025-09-05 DIAGNOSIS — Z94.0 KIDNEY TRANSPLANT STATUS: Chronic | ICD-10-CM

## 2025-09-05 DIAGNOSIS — Z85.828 PERSONAL HISTORY OF OTHER MALIGNANT NEOPLASM OF SKIN: Chronic | ICD-10-CM

## 2025-09-05 DIAGNOSIS — Z00.8 ENCOUNTER FOR OTHER GENERAL EXAMINATION: ICD-10-CM

## 2025-09-05 PROCEDURE — 70543 MRI ORBT/FAC/NCK W/O &W/DYE: CPT | Mod: 26

## 2025-09-05 PROCEDURE — 70543 MRI ORBT/FAC/NCK W/O &W/DYE: CPT

## 2025-09-05 PROCEDURE — A9585: CPT

## 2025-09-08 ENCOUNTER — TRANSCRIPTION ENCOUNTER (OUTPATIENT)
Age: 65
End: 2025-09-08

## 2025-09-10 ENCOUNTER — NON-APPOINTMENT (OUTPATIENT)
Age: 65
End: 2025-09-10

## 2025-09-16 ENCOUNTER — APPOINTMENT (OUTPATIENT)
Dept: UROLOGY | Facility: CLINIC | Age: 65
End: 2025-09-16

## 2025-09-17 ENCOUNTER — APPOINTMENT (OUTPATIENT)
Dept: RADIATION ONCOLOGY | Facility: CLINIC | Age: 65
End: 2025-09-17
Payer: COMMERCIAL

## 2025-09-17 PROCEDURE — 99215 OFFICE O/P EST HI 40 MIN: CPT

## 2025-09-18 ENCOUNTER — TRANSCRIPTION ENCOUNTER (OUTPATIENT)
Age: 65
End: 2025-09-18

## 2025-09-18 RX ORDER — DUPILUMAB 300 MG/2ML
300 INJECTION, SOLUTION SUBCUTANEOUS
Qty: 2 | Refills: 2 | Status: ACTIVE | COMMUNITY
Start: 2025-09-18 | End: 1900-01-01

## 2025-09-19 ENCOUNTER — APPOINTMENT (OUTPATIENT)
Dept: WOUND CARE | Facility: HOSPITAL | Age: 65
End: 2025-09-19
Payer: COMMERCIAL

## 2025-09-19 VITALS
OXYGEN SATURATION: 97 % | RESPIRATION RATE: 16 BRPM | HEART RATE: 67 BPM | SYSTOLIC BLOOD PRESSURE: 123 MMHG | TEMPERATURE: 98.6 F | DIASTOLIC BLOOD PRESSURE: 75 MMHG

## 2025-09-19 DIAGNOSIS — Z71.9 COUNSELING, UNSPECIFIED: ICD-10-CM

## 2025-09-19 DIAGNOSIS — T66.XXXA RADIATION SICKNESS, UNSPECIFIED, INITIAL ENCOUNTER: ICD-10-CM

## 2025-09-19 PROCEDURE — 99215 OFFICE O/P EST HI 40 MIN: CPT

## (undated) DEVICE — CANISTER SUCTION LID GUARD 3000CC

## (undated) DEVICE — DRSG STERISTRIPS 0.5X4"

## (undated) DEVICE — SUT MONOSOF 4-0 18" C-13

## (undated) DEVICE — ELCTR PENCIL SMOKE EVACUATION

## (undated) DEVICE — PROTECTOR CORNEAL BLUE ADULT

## (undated) DEVICE — SOL IRR POUR H2O 1500ML

## (undated) DEVICE — PACK MINOR

## (undated) DEVICE — PREP CHLORAPREP ORANGE 2PCT 26ML

## (undated) DEVICE — GAMMA SLEEVE DISPOSABLE

## (undated) DEVICE — POSITIONER FOAM HEAD CRADLE

## (undated) DEVICE — BLADE SAFETY LOCK #10

## (undated) DEVICE — GOWN TRIMAX LG

## (undated) DEVICE — GLV 7.5 PROTEXIS

## (undated) DEVICE — DRAPE MAGNETIC INSTRUMENT MEDIUM

## (undated) DEVICE — LABEL MED BLANK W PEN

## (undated) DEVICE — POSITIONER STRAP ARMBOARD VELCRO TS-30 12

## (undated) DEVICE — DRAPE LAPAROTOMY TRANSVERSE

## (undated) DEVICE — CONTAINER SPECIMEN PET

## (undated) DEVICE — COTTONBALL LG

## (undated) DEVICE — WRAP COMPRESSION CALF MED

## (undated) DEVICE — DRAIN JACKSON PRATT 10MM FLAT FULL NO TROCAR

## (undated) DEVICE — DRSG XEROFORM 5"

## (undated) DEVICE — DRAPE 3/4 SHEET W REINFORCEMENT 56X77"

## (undated) DEVICE — DRAPE HALF SHEET 40X57"

## (undated) DEVICE — DRAPE SPLIT SHEETS 77X108"

## (undated) DEVICE — PACK MINOR WITH LAP

## (undated) DEVICE — SYR LUER LOK 20CC

## (undated) DEVICE — ELCTR GROUNDING PAD ADULT COVIDIEN

## (undated) DEVICE — SPONGE LAP X-RAY DETECTABLE 18X18"

## (undated) DEVICE — DRSG TEGADERM 4X4.75"

## (undated) DEVICE — SUT PLAIN GUT 4-0 30" C-13

## (undated) DEVICE — BLANKET WARMER LOWER ADULT

## (undated) DEVICE — WRAP COMPRESSION CALF LG

## (undated) DEVICE — MARKER SKIN MULTI TIP 6"

## (undated) DEVICE — NEEDLE COUNTER  FOAM AND MAGNET 40-70

## (undated) DEVICE — PIN DISPENSING SPIKE MINI

## (undated) DEVICE — SUT SOFSILK 2-0 18" C-23

## (undated) DEVICE — GLV 6.5 PROTEXIS

## (undated) DEVICE — GLV 8 PROTEXIS

## (undated) DEVICE — SOL IRR POUR NS 0.9% 500ML

## (undated) DEVICE — POSITIONER PATIENT SAFETY STRAP 3X60"

## (undated) DEVICE — SUT MONOCRYL 4-0 18" P-3 UNDYED

## (undated) DEVICE — PREP BETADINE KIT

## (undated) DEVICE — CONTAINER SPECIMEN 100ML

## (undated) DEVICE — BLADE SAFETY LOCK #15

## (undated) DEVICE — SUT SOFSILK 4-0 30" CV-23

## (undated) DEVICE — GLV 7 PROTEXIS

## (undated) DEVICE — GLV 8 ULTRAFREE MAX

## (undated) DEVICE — STAPLER SKIN VISI-STAT 35 WIDE

## (undated) DEVICE — SUT POLYSORB 2-0 30" V-20 UNDYED

## (undated) DEVICE — BLADE ZIMMER DERMATONE

## (undated) DEVICE — SUT POLYSORB 3-0 30" V-20 UNDYED

## (undated) DEVICE — GLV 8.5 PROTEXIS

## (undated) DEVICE — DRAPE LIGHT HANDLE COVER FLEX GREEN

## (undated) DEVICE — DRAPE TOWEL BLUE 17" X 24"

## (undated) DEVICE — SUT MONOCRYL 3-0 18" PS-1

## (undated) DEVICE — DRAIN RESERVOIR FOR JACKSON PRATT 100CC CARDINAL

## (undated) DEVICE — GLV 7.5 ULTRAFREE MAX

## (undated) DEVICE — DRAPE INSTRUMENT POUCH

## (undated) DEVICE — SUT MONOCRYL 3-0 27" PS-2 UNDYED

## (undated) DEVICE — DRSG 4X4

## (undated) DEVICE — SUT VICRYL 2-0 27" SH UNDYED

## (undated) DEVICE — ELCTR PENCIL NEPTUNE SMOKE EVACUATION

## (undated) DEVICE — BLANKET WARMER UPPER ADULT

## (undated) DEVICE — SUT VICRYL 3-0 27" SH UNDYED